# Patient Record
Sex: MALE | Race: WHITE | NOT HISPANIC OR LATINO | URBAN - METROPOLITAN AREA
[De-identification: names, ages, dates, MRNs, and addresses within clinical notes are randomized per-mention and may not be internally consistent; named-entity substitution may affect disease eponyms.]

---

## 2020-11-27 ENCOUNTER — EMERGENCY (EMERGENCY)
Facility: HOSPITAL | Age: 85
LOS: 0 days | Discharge: HOME | End: 2020-11-27
Attending: EMERGENCY MEDICINE
Payer: MEDICARE

## 2020-11-27 VITALS
HEIGHT: 68 IN | OXYGEN SATURATION: 96 % | HEART RATE: 85 BPM | WEIGHT: 210.1 LBS | RESPIRATION RATE: 17 BRPM | TEMPERATURE: 99 F

## 2020-11-27 VITALS
DIASTOLIC BLOOD PRESSURE: 56 MMHG | HEART RATE: 72 BPM | TEMPERATURE: 98 F | OXYGEN SATURATION: 96 % | SYSTOLIC BLOOD PRESSURE: 154 MMHG | RESPIRATION RATE: 18 BRPM

## 2020-11-27 DIAGNOSIS — S09.93XA UNSPECIFIED INJURY OF FACE, INITIAL ENCOUNTER: ICD-10-CM

## 2020-11-27 DIAGNOSIS — Z88.5 ALLERGY STATUS TO NARCOTIC AGENT: ICD-10-CM

## 2020-11-27 DIAGNOSIS — Z23 ENCOUNTER FOR IMMUNIZATION: ICD-10-CM

## 2020-11-27 DIAGNOSIS — S00.201A UNSPECIFIED SUPERFICIAL INJURY OF RIGHT EYELID AND PERIOCULAR AREA, INITIAL ENCOUNTER: ICD-10-CM

## 2020-11-27 DIAGNOSIS — Z98.89 OTHER SPECIFIED POSTPROCEDURAL STATES: Chronic | ICD-10-CM

## 2020-11-27 DIAGNOSIS — R60.0 LOCALIZED EDEMA: ICD-10-CM

## 2020-11-27 DIAGNOSIS — S09.8XXA OTHER SPECIFIED INJURIES OF HEAD, INITIAL ENCOUNTER: ICD-10-CM

## 2020-11-27 DIAGNOSIS — E11.9 TYPE 2 DIABETES MELLITUS WITHOUT COMPLICATIONS: ICD-10-CM

## 2020-11-27 DIAGNOSIS — Z79.02 LONG TERM (CURRENT) USE OF ANTITHROMBOTICS/ANTIPLATELETS: ICD-10-CM

## 2020-11-27 DIAGNOSIS — Z88.0 ALLERGY STATUS TO PENICILLIN: ICD-10-CM

## 2020-11-27 DIAGNOSIS — C44.91 BASAL CELL CARCINOMA OF SKIN, UNSPECIFIED: Chronic | ICD-10-CM

## 2020-11-27 DIAGNOSIS — M72.0 PALMAR FASCIAL FIBROMATOSIS [DUPUYTREN]: Chronic | ICD-10-CM
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
## 2020-11-27 DIAGNOSIS — M19.90 UNSPECIFIED OSTEOARTHRITIS, UNSPECIFIED SITE: ICD-10-CM

## 2020-11-27 DIAGNOSIS — E78.5 HYPERLIPIDEMIA, UNSPECIFIED: ICD-10-CM

## 2020-11-27 DIAGNOSIS — I10 ESSENTIAL (PRIMARY) HYPERTENSION: ICD-10-CM
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
## 2020-11-27 DIAGNOSIS — H33.20 SEROUS RETINAL DETACHMENT, UNSPECIFIED EYE: Chronic | ICD-10-CM

## 2020-11-27 PROCEDURE — 99284 EMERGENCY DEPT VISIT MOD MDM: CPT | Mod: 25

## 2020-11-27 PROCEDURE — 12011 RPR F/E/E/N/L/M 2.5 CM/<: CPT

## 2020-11-27 PROCEDURE — 72125 CT NECK SPINE W/O DYE: CPT | Mod: 26

## 2020-11-27 PROCEDURE — 70450 CT HEAD/BRAIN W/O DYE: CPT

## 2020-11-27 PROCEDURE — 82962 GLUCOSE BLOOD TEST: CPT

## 2020-11-27 PROCEDURE — 76376 3D RENDER W/INTRP POSTPROCES: CPT

## 2020-11-27 PROCEDURE — 70486 CT MAXILLOFACIAL W/O DYE: CPT

## 2020-11-27 PROCEDURE — 99283 EMERGENCY DEPT VISIT LOW MDM: CPT

## 2020-11-27 PROCEDURE — 72125 CT NECK SPINE W/O DYE: CPT

## 2020-11-27 PROCEDURE — 90471 IMMUNIZATION ADMIN: CPT

## 2020-11-27 PROCEDURE — 70486 CT MAXILLOFACIAL W/O DYE: CPT | Mod: 26

## 2020-11-27 PROCEDURE — 70450 CT HEAD/BRAIN W/O DYE: CPT | Mod: 26

## 2020-11-27 PROCEDURE — 76376 3D RENDER W/INTRP POSTPROCES: CPT | Mod: 26

## 2020-11-27 PROCEDURE — 90715 TDAP VACCINE 7 YRS/> IM: CPT

## 2020-11-27 RX ORDER — ACETAMINOPHEN 500 MG
650 TABLET ORAL ONCE
Refills: 0 | Status: COMPLETED | OUTPATIENT
Start: 2020-11-27 | End: 2020-11-27

## 2020-11-27 RX ORDER — TETANUS TOXOID, REDUCED DIPHTHERIA TOXOID AND ACELLULAR PERTUSSIS VACCINE, ADSORBED 5; 2.5; 8; 8; 2.5 [IU]/.5ML; [IU]/.5ML; UG/.5ML; UG/.5ML; UG/.5ML
0.5 SUSPENSION INTRAMUSCULAR ONCE
Refills: 0 | Status: COMPLETED | OUTPATIENT
Start: 2020-11-27 | End: 2020-11-27

## 2020-11-27 RX ADMIN — TETANUS TOXOID, REDUCED DIPHTHERIA TOXOID AND ACELLULAR PERTUSSIS VACCINE, ADSORBED 0.5 MILLILITER(S): 5; 2.5; 8; 8; 2.5 SUSPENSION INTRAMUSCULAR at 07:55

## 2020-11-27 RX ADMIN — Medication 650 MILLIGRAM(S): at 06:10

## 2020-11-27 NOTE — ED PROVIDER NOTE - PATIENT PORTAL LINK FT
You can access the FollowMyHealth Patient Portal offered by [facility] by registering at the following website: [email or web address]. By joining [facility]’s FollowMyHealth portal, you will also be able to view your health information using other applications (apps) compatible with our system. Island Pedicle Flap With Canthal Suspension Text: The defect edges were debeveled with a #15 scalpel blade.  Given the location of the defect, shape of the defect and the proximity to free margins an island pedicle advancement flap was deemed most appropriate.  Using a sterile surgical marker, an appropriate advancement flap was drawn incorporating the defect, outlining the appropriate donor tissue and placing the expected incisions within the relaxed skin tension lines where possible. The area thus outlined was incised deep to adipose tissue with a #15 scalpel blade.  The skin margins were undermined to an appropriate distance in all directions around the primary defect and laterally outward around the island pedicle utilizing iris scissors.  There was minimal undermining beneath the pedicle flap. A suspension suture was placed in the canthal tendon to prevent tension and prevent ectropion.

## 2020-11-27 NOTE — ED PROVIDER NOTE - OBJECTIVE STATEMENT
89 yo male with h/o HTN, HLD, DM s/p fall. Pt rolled over in bed and fell out.  Hit his head on the nightstand on the way down.  C/o pain and swelling to the right eyelid.  Denies headache, neck pain, vision changes, back pain, chest pain or extremity injury.

## 2020-11-27 NOTE — ED ADULT NURSE NOTE - OBJECTIVE STATEMENT
Pt rolled over in bed and fell out.  Hit his head on the nightstand on the way down.  C/o pain and swelling to the right eyelid.  Denies headache, neck pain, vision changes, back pain, chest pain or extremity injury.

## 2020-11-27 NOTE — ED PROVIDER NOTE - ENMT, MLM
Airway patent, Nasal mucosa clear. Mouth with normal mucosa. +right eyebrow hematoma and superficial laceration

## 2020-11-27 NOTE — ED PROVIDER NOTE - EYES, MLM
Clear bilaterally, pupils equal, round and reactive to light.  EOMI.  Right upper eyelid ecchymotic and edematous.  Cannot open eyelid on his own; vision clear when eyelid opened

## 2020-11-27 NOTE — ED PROVIDER NOTE - CARE PLAN
Principal Discharge DX:	Head injury  Secondary Diagnosis:	Eyebrow laceration, right, initial encounter

## 2020-11-27 NOTE — ED ADULT TRIAGE NOTE - CHIEF COMPLAINT QUOTE
patient states he rolled out of bed and hit his head on the night stand.  patient has hematoma to right eye.  denies LOC no other pain or injury reported [facility] on [facility] alert called.

## 2020-11-27 NOTE — ED ADULT NURSE NOTE - CHIEF COMPLAINT QUOTE
patient states he rolled out of bed and hit his head on the night stand.  patient has hematoma to right eye.  denies LOC no other pain or injury reported [facility] on [facility] alert called.

## 2020-11-27 NOTE — ED PROVIDER NOTE - PMH
Carotid artery disorder  Left carotid congenital anomaly  HLD (hyperlipidemia)    HTN (hypertension)    Melanoma in situ  left thigh  Osteoarthritis    Type 2 diabetes mellitus without complication

## 2020-11-27 NOTE — ED PROVIDER NOTE - NSFOLLOWUPINSTRUCTIONS_ED_ALL_ED_FT
See attached results of CT scans  Sleep with head of the bed elevated and ice to reduce swelling  Skin adhesive will absorb in the next week. Do not pick at the wound.  Keep dry x 24 hours  Return to ED for any further concerns  Tylenol 1 gram every 6 hours if needed    Head Injury, Adult  There are many types of head injuries. Head injuries can be as minor as a bump, or they can be more severe. More severe head injuries include:  A jarring injury to the brain (concussion).A bruise of the brain (contusion). This means there is bleeding in the brain that can cause swelling.A cracked skull (skull fracture).Bleeding in the brain that collects, clots, and forms a bump (hematoma).After a head injury, you may need to be observed for a while in the emergency department or urgent care. Sometimes admission to the hospital is needed.  After a head injury has happened, most problems occur within the first 24 hours, but side effects may occur up to 7–10 days after the injury. It is important to watch your condition for any changes.  What are the causes?  There are many possible causes of a head injury. A serious head injury may happen to someone who is in a car accident (motor vehicle collision). Other causes of major head injuries include bicycle or motorcycle accidents, sports injuries, and falls.  Risk factors  This condition is more likely to occur in people who:  Drink a lot of alcohol or use drugs.Are over the age of 65.Are at risk for falls.What are the symptoms?  There are many possible symptoms of a head injury. Visible symptoms of a head injury include a bruise, bump, or bleeding at the site of the injury. Other non-visible symptoms include:  Feeling sleepy or not being able to stay awake.Passing out.Headache.Seizures.Dizziness.Confusion.Memory problems.Nausea or vomiting.Other possible symptoms that may develop after the head injury include:  Poor attention and concentration.Fatigue or tiring easily.Irritability.Being uncomfortable around bright lights or loud noises.Anxiety or depression.Disturbed sleep.How is this diagnosed?  This condition can usually be diagnosed based on your symptoms, a description of the injury, and a physical exam. You may also have imaging tests done, such as a CT scan or MRI. You will also be closely watched.  How is this treated?  Treatment for this condition depends on the severity and type of injury you have. The main goal of treatment is to prevent complications and allow the brain time to heal.  For mild head injury, you may be sent home and treatment may include:  Observation. A responsible adult should stay with you for 24 hours after your injury and check on you often.Physical rest.Brain rest.Pain medicines.For severe brain injury, treatment may include:  Close observation. This includes hospitalization with frequent physical exams. You may need to go to a hospital that specializes in head injury.Pain medicines.Breathing support. This may include using a ventilator.Managing the pressure inside the brain (intracranial pressure, or ICP). This may include:  Monitoring the ICP.Giving medicines to decrease the ICP.Positioning you to decrease the ICP.Medicine to prevent seizures.Surgery to stop bleeding or to remove blood clots (craniotomy).Surgery to remove part of the skull (decompressive craniectomy). This allows room for the brain to swell.Follow these instructions at home:  Activity     Rest as much as possible and avoid activities that are physically hard or tiring.Make sure you get enough sleep.Limit activities that require a lot of thought or attention, such as:  Watching TV.Playing memory games and puzzles.Job-related work or homework.Working on the computer, social media, and texting.Avoid activities that could cause another head injury, such as playing sports, until your health care provider approves. Having another head injury, especially before the first one has healed, can be dangerous.Ask your health care provider when it is safe for you to return to your regular activities, including work or school. Ask your health care provider for a step-by-step plan for gradually returning to activities.Ask your health care provider when you can drive, ride a bicycle, or use heavy machinery. Your ability to react may be slower after a brain injury. Never do these activities if you are dizzy.Lifestyle     Do not drink alcohol until your health care provider approves, and avoid drug use. Alcohol and certain drugs may slow your recovery and can put you at risk of further injury.If it is harder than usual to remember things, write them down.If you are easily distracted, try to do one thing at a time.Talk with family members or close friends when making important decisions.Tell your friends, family, a trusted colleague, and  about your injury, symptoms, and restrictions. Have them watch for any new or worsening problems.General instructions     Take over-the-counter and prescription medicines only as told by your health care provider.Have someone stay with you for 24 hours after your head injury. This person should watch you for any changes in your symptoms and be ready to seek medical help, as needed.Keep all follow-up visits as told by your health care provider. This is important.How is this prevented?  Work on improving your balance and strength to avoid falls.Wear a seatbelt when you are in a moving vehicle.Wear a helmet when riding a bicycle, skiing, or doing any other sport or activity that has a risk of injury.Drink alcohol only in moderation.Take safety measures in your home, such as:  Removing clutter and tripping hazards from floors and stairways.Using grab bars in bathrooms and handrails by stairs.Placing non-slip mats on floors and in bathtubs.Improving lighting in dim areas.Get help right away if:  You have:  A severe headache that is not helped by medicine.Trouble walking, have weakness in your arms and legs, or lose your balance.Clear or bloody fluid coming from your nose or ears.Changes in your vision.A seizure.You vomit.Your symptoms get worse.Your speech is slurred.You pass out.You are sleepier and have trouble staying awake.Your pupils change size.These symptoms may represent a serious problem that is an emergency. Do not wait to see if the symptoms will go away. Get medical help right away. Call your local emergency services (911 in the [place] Do not drive yourself to the hospital.   This information is not intended to replace advice given to you by your health care provider. Make sure you discuss any questions you have with your health care provider.    Laceration    A laceration is a cut that goes through all of the layers of the skin and into the tissue that is right under the skin. Some lacerations heal on their own. Others need to be closed with skin adhesive strips, skin glue, stitches (sutures), or staples. Proper laceration care minimizes the risk of infection and helps the laceration to heal better.  If non-absorbable stitches or staples have been placed, they must be taken out within the time frame instructed by your healthcare provider.    SEEK IMMEDIATE MEDICAL CARE IF YOU HAVE ANY OF THE FOLLOWING SYMPTOMS: swelling around the wound, worsening pain, drainage from the wound, red streaking going away from your wound, inability to move finger or toe near the laceration, or discoloration of skin near the laceration.

## 2021-12-19 ENCOUNTER — INPATIENT (INPATIENT)
Facility: HOSPITAL | Age: 86
LOS: 3 days | Discharge: HOME | DRG: 299 | End: 2021-12-23
Attending: INTERNAL MEDICINE | Admitting: INTERNAL MEDICINE
Payer: MEDICARE

## 2021-12-19 VITALS
RESPIRATION RATE: 16 BRPM | WEIGHT: 220.02 LBS | TEMPERATURE: 98 F | DIASTOLIC BLOOD PRESSURE: 82 MMHG | SYSTOLIC BLOOD PRESSURE: 132 MMHG | HEART RATE: 112 BPM | HEIGHT: 68 IN | OXYGEN SATURATION: 95 %

## 2021-12-19 DIAGNOSIS — R09.89 OTHER SPECIFIED SYMPTOMS AND SIGNS INVOLVING THE CIRCULATORY AND RESPIRATORY SYSTEMS: ICD-10-CM

## 2021-12-19 DIAGNOSIS — C44.91 BASAL CELL CARCINOMA OF SKIN, UNSPECIFIED: Chronic | ICD-10-CM

## 2021-12-19 DIAGNOSIS — Z98.89 OTHER SPECIFIED POSTPROCEDURAL STATES: Chronic | ICD-10-CM

## 2021-12-19 DIAGNOSIS — I48.91 UNSPECIFIED ATRIAL FIBRILLATION: ICD-10-CM

## 2021-12-19 DIAGNOSIS — H33.20 SEROUS RETINAL DETACHMENT, UNSPECIFIED EYE: Chronic | ICD-10-CM

## 2021-12-19 DIAGNOSIS — M72.0 PALMAR FASCIAL FIBROMATOSIS [DUPUYTREN]: Chronic | ICD-10-CM

## 2021-12-19 LAB
ALBUMIN SERPL ELPH-MCNC: 3.1 G/DL — LOW (ref 3.3–5)
ALP SERPL-CCNC: 128 U/L — HIGH (ref 30–120)
ALT FLD-CCNC: 76 U/L DA — HIGH (ref 10–60)
ANION GAP SERPL CALC-SCNC: 13 MMOL/L — SIGNIFICANT CHANGE UP (ref 5–17)
APPEARANCE UR: CLEAR — SIGNIFICANT CHANGE UP
APTT BLD: 31.6 SEC — SIGNIFICANT CHANGE UP (ref 27.5–35.5)
AST SERPL-CCNC: 36 U/L — SIGNIFICANT CHANGE UP (ref 10–40)
BASOPHILS # BLD AUTO: 0.05 K/UL — SIGNIFICANT CHANGE UP (ref 0–0.2)
BASOPHILS NFR BLD AUTO: 0.4 % — SIGNIFICANT CHANGE UP (ref 0–2)
BILIRUB SERPL-MCNC: 0.7 MG/DL — SIGNIFICANT CHANGE UP (ref 0.2–1.2)
BILIRUB UR-MCNC: NEGATIVE — SIGNIFICANT CHANGE UP
BUN SERPL-MCNC: 61 MG/DL — HIGH (ref 7–23)
CALCIUM SERPL-MCNC: 8 MG/DL — LOW (ref 8.4–10.5)
CHLORIDE SERPL-SCNC: 107 MMOL/L — SIGNIFICANT CHANGE UP (ref 96–108)
CO2 SERPL-SCNC: 21 MMOL/L — LOW (ref 22–31)
COLOR SPEC: YELLOW — SIGNIFICANT CHANGE UP
CREAT SERPL-MCNC: 1.73 MG/DL — HIGH (ref 0.5–1.3)
DIFF PNL FLD: NEGATIVE — SIGNIFICANT CHANGE UP
EOSINOPHIL # BLD AUTO: 0.06 K/UL — SIGNIFICANT CHANGE UP (ref 0–0.5)
EOSINOPHIL NFR BLD AUTO: 0.5 % — SIGNIFICANT CHANGE UP (ref 0–6)
GLUCOSE SERPL-MCNC: 146 MG/DL — HIGH (ref 70–99)
GLUCOSE UR QL: NEGATIVE MG/DL — SIGNIFICANT CHANGE UP
HCT VFR BLD CALC: 37.3 % — LOW (ref 39–50)
HGB BLD-MCNC: 12.1 G/DL — LOW (ref 13–17)
IMM GRANULOCYTES NFR BLD AUTO: 1 % — SIGNIFICANT CHANGE UP (ref 0–1.5)
INR BLD: 1.19 RATIO — HIGH (ref 0.88–1.16)
KETONES UR-MCNC: NEGATIVE — SIGNIFICANT CHANGE UP
LACTATE SERPL-SCNC: 1.6 MMOL/L — SIGNIFICANT CHANGE UP (ref 0.7–2)
LEUKOCYTE ESTERASE UR-ACNC: ABNORMAL
LIDOCAIN IGE QN: 93 U/L — SIGNIFICANT CHANGE UP (ref 73–393)
LYMPHOCYTES # BLD AUTO: 1.84 K/UL — SIGNIFICANT CHANGE UP (ref 1–3.3)
LYMPHOCYTES # BLD AUTO: 16 % — SIGNIFICANT CHANGE UP (ref 13–44)
MCHC RBC-ENTMCNC: 30 PG — SIGNIFICANT CHANGE UP (ref 27–34)
MCHC RBC-ENTMCNC: 32.4 GM/DL — SIGNIFICANT CHANGE UP (ref 32–36)
MCV RBC AUTO: 92.3 FL — SIGNIFICANT CHANGE UP (ref 80–100)
MONOCYTES # BLD AUTO: 0.78 K/UL — SIGNIFICANT CHANGE UP (ref 0–0.9)
MONOCYTES NFR BLD AUTO: 6.8 % — SIGNIFICANT CHANGE UP (ref 2–14)
NEUTROPHILS # BLD AUTO: 8.66 K/UL — HIGH (ref 1.8–7.4)
NEUTROPHILS NFR BLD AUTO: 75.3 % — SIGNIFICANT CHANGE UP (ref 43–77)
NITRITE UR-MCNC: NEGATIVE — SIGNIFICANT CHANGE UP
NRBC # BLD: 0 /100 WBCS — SIGNIFICANT CHANGE UP (ref 0–0)
NT-PROBNP SERPL-SCNC: 7572 PG/ML — HIGH (ref 0–450)
PH UR: 5 — SIGNIFICANT CHANGE UP (ref 5–8)
PLATELET # BLD AUTO: 263 K/UL — SIGNIFICANT CHANGE UP (ref 150–400)
POTASSIUM SERPL-MCNC: 4.7 MMOL/L — SIGNIFICANT CHANGE UP (ref 3.5–5.3)
POTASSIUM SERPL-SCNC: 4.7 MMOL/L — SIGNIFICANT CHANGE UP (ref 3.5–5.3)
PROT SERPL-MCNC: 6.3 G/DL — SIGNIFICANT CHANGE UP (ref 6–8.3)
PROT UR-MCNC: 30 MG/DL
PROTHROM AB SERPL-ACNC: 14.3 SEC — HIGH (ref 10.6–13.6)
RAPID RVP RESULT: SIGNIFICANT CHANGE UP
RBC # BLD: 4.04 M/UL — LOW (ref 4.2–5.8)
RBC # FLD: 14.1 % — SIGNIFICANT CHANGE UP (ref 10.3–14.5)
SARS-COV-2 RNA SPEC QL NAA+PROBE: SIGNIFICANT CHANGE UP
SODIUM SERPL-SCNC: 141 MMOL/L — SIGNIFICANT CHANGE UP (ref 135–145)
SP GR SPEC: 1.02 — SIGNIFICANT CHANGE UP (ref 1.01–1.02)
TROPONIN I, HIGH SENSITIVITY RESULT: 26.4 NG/L — SIGNIFICANT CHANGE UP
UROBILINOGEN FLD QL: NEGATIVE MG/DL — SIGNIFICANT CHANGE UP
WBC # BLD: 11.5 K/UL — HIGH (ref 3.8–10.5)
WBC # FLD AUTO: 11.5 K/UL — HIGH (ref 3.8–10.5)

## 2021-12-19 PROCEDURE — 99223 1ST HOSP IP/OBS HIGH 75: CPT

## 2021-12-19 PROCEDURE — 93010 ELECTROCARDIOGRAM REPORT: CPT

## 2021-12-19 PROCEDURE — 71045 X-RAY EXAM CHEST 1 VIEW: CPT | Mod: 26

## 2021-12-19 PROCEDURE — 93970 EXTREMITY STUDY: CPT | Mod: 26

## 2021-12-19 PROCEDURE — 99291 CRITICAL CARE FIRST HOUR: CPT

## 2021-12-19 RX ORDER — SODIUM CHLORIDE 9 MG/ML
2100 INJECTION INTRAMUSCULAR; INTRAVENOUS; SUBCUTANEOUS ONCE
Refills: 0 | Status: COMPLETED | OUTPATIENT
Start: 2021-12-19 | End: 2021-12-19

## 2021-12-19 RX ORDER — GABAPENTIN 400 MG/1
100 CAPSULE ORAL DAILY
Refills: 0 | Status: DISCONTINUED | OUTPATIENT
Start: 2021-12-19 | End: 2021-12-23

## 2021-12-19 RX ORDER — DILTIAZEM HCL 120 MG
10 CAPSULE, EXT RELEASE 24 HR ORAL
Qty: 125 | Refills: 0 | Status: DISCONTINUED | OUTPATIENT
Start: 2021-12-19 | End: 2021-12-20

## 2021-12-19 RX ORDER — DILTIAZEM HCL 120 MG
10 CAPSULE, EXT RELEASE 24 HR ORAL ONCE
Refills: 0 | Status: COMPLETED | OUTPATIENT
Start: 2021-12-19 | End: 2021-12-19

## 2021-12-19 RX ORDER — FUROSEMIDE 40 MG
40 TABLET ORAL ONCE
Refills: 0 | Status: COMPLETED | OUTPATIENT
Start: 2021-12-19 | End: 2021-12-19

## 2021-12-19 RX ORDER — DEXTROSE 50 % IN WATER 50 %
12.5 SYRINGE (ML) INTRAVENOUS ONCE
Refills: 0 | Status: DISCONTINUED | OUTPATIENT
Start: 2021-12-19 | End: 2021-12-23

## 2021-12-19 RX ORDER — CLOPIDOGREL BISULFATE 75 MG/1
37.5 TABLET, FILM COATED ORAL DAILY
Refills: 0 | Status: DISCONTINUED | OUTPATIENT
Start: 2021-12-19 | End: 2021-12-20

## 2021-12-19 RX ORDER — IPRATROPIUM/ALBUTEROL SULFATE 18-103MCG
3 AEROSOL WITH ADAPTER (GRAM) INHALATION ONCE
Refills: 0 | Status: COMPLETED | OUTPATIENT
Start: 2021-12-19 | End: 2021-12-19

## 2021-12-19 RX ORDER — DEXTROSE 50 % IN WATER 50 %
25 SYRINGE (ML) INTRAVENOUS ONCE
Refills: 0 | Status: DISCONTINUED | OUTPATIENT
Start: 2021-12-19 | End: 2021-12-23

## 2021-12-19 RX ORDER — INSULIN GLARGINE 100 [IU]/ML
10 INJECTION, SOLUTION SUBCUTANEOUS EVERY MORNING
Refills: 0 | Status: DISCONTINUED | OUTPATIENT
Start: 2021-12-20 | End: 2021-12-23

## 2021-12-19 RX ORDER — DEXTROSE 50 % IN WATER 50 %
15 SYRINGE (ML) INTRAVENOUS ONCE
Refills: 0 | Status: DISCONTINUED | OUTPATIENT
Start: 2021-12-19 | End: 2021-12-23

## 2021-12-19 RX ORDER — SODIUM CHLORIDE 9 MG/ML
1000 INJECTION, SOLUTION INTRAVENOUS
Refills: 0 | Status: DISCONTINUED | OUTPATIENT
Start: 2021-12-19 | End: 2021-12-23

## 2021-12-19 RX ORDER — METOPROLOL TARTRATE 50 MG
50 TABLET ORAL
Refills: 0 | Status: DISCONTINUED | OUTPATIENT
Start: 2021-12-19 | End: 2021-12-22

## 2021-12-19 RX ORDER — ENOXAPARIN SODIUM 100 MG/ML
100 INJECTION SUBCUTANEOUS ONCE
Refills: 0 | Status: COMPLETED | OUTPATIENT
Start: 2021-12-19 | End: 2021-12-19

## 2021-12-19 RX ORDER — ENOXAPARIN SODIUM 100 MG/ML
100 INJECTION SUBCUTANEOUS
Refills: 0 | Status: DISCONTINUED | OUTPATIENT
Start: 2021-12-20 | End: 2021-12-21

## 2021-12-19 RX ORDER — INSULIN LISPRO 100/ML
VIAL (ML) SUBCUTANEOUS AT BEDTIME
Refills: 0 | Status: DISCONTINUED | OUTPATIENT
Start: 2021-12-19 | End: 2021-12-23

## 2021-12-19 RX ORDER — INSULIN LISPRO 100/ML
VIAL (ML) SUBCUTANEOUS
Refills: 0 | Status: DISCONTINUED | OUTPATIENT
Start: 2021-12-19 | End: 2021-12-23

## 2021-12-19 RX ORDER — ATORVASTATIN CALCIUM 80 MG/1
20 TABLET, FILM COATED ORAL AT BEDTIME
Refills: 0 | Status: DISCONTINUED | OUTPATIENT
Start: 2021-12-19 | End: 2021-12-23

## 2021-12-19 RX ORDER — GLUCAGON INJECTION, SOLUTION 0.5 MG/.1ML
1 INJECTION, SOLUTION SUBCUTANEOUS ONCE
Refills: 0 | Status: DISCONTINUED | OUTPATIENT
Start: 2021-12-19 | End: 2021-12-23

## 2021-12-19 RX ADMIN — Medication 3 MILLILITER(S): at 23:16

## 2021-12-19 RX ADMIN — Medication 10 MG/HR: at 17:03

## 2021-12-19 RX ADMIN — Medication 10 MG/HR: at 15:11

## 2021-12-19 RX ADMIN — Medication 40 MILLIGRAM(S): at 23:00

## 2021-12-19 RX ADMIN — Medication 10 MG/HR: at 20:45

## 2021-12-19 RX ADMIN — ENOXAPARIN SODIUM 100 MILLIGRAM(S): 100 INJECTION SUBCUTANEOUS at 16:10

## 2021-12-19 RX ADMIN — Medication 50 MILLIGRAM(S): at 23:44

## 2021-12-19 RX ADMIN — SODIUM CHLORIDE 2100 MILLILITER(S): 9 INJECTION INTRAMUSCULAR; INTRAVENOUS; SUBCUTANEOUS at 15:17

## 2021-12-19 RX ADMIN — Medication 10 MILLIGRAM(S): at 15:10

## 2021-12-19 NOTE — ED PROVIDER NOTE - CRITICAL CARE ATTENDING CONTRIBUTION TO CARE
pt presenting with A-fib RVR, Cardizem gtt ordered and titrated to rate control. Pt with HARESH so CTA deferred but venous duplex obtained and DVT noted. Pt has been AC with Lovenox. Cardiology consulted. Pt will require admission. Daughter and pt updated

## 2021-12-19 NOTE — ED PROVIDER NOTE - OBJECTIVE STATEMENT
89 y/o male with a PMHx of DM2, HLD, HTN, h/o of basal cell carcinoma, left carotid congential anomaly, OA, melanoma presents to the ED c/o fatigue & dry cough for the past few days and one episode of vomiting today. Pt states he was been feeling "weary" on the back of his shoulder blades for the past few days so he went to see Dr. Richard on 12/17/2021 and had a COVID test which reportedly came back negative. Today, pt woke up with no appetite and attempted to eat some bread and butter which he immediately vomited so came to the ED for evaluation. Pt also notes he has not been sleeping well. Denies fever, CP, SOB, abd pain, or any other symptoms. No other complaints at this time. PCP: Dr. Frank Richard.

## 2021-12-19 NOTE — H&P ADULT - PROBLEM SELECTOR PLAN 2
possibly unprovoked, no h/o previous thromboembolism, possible associated PE but no CTA given his advanced nephropathy, and will not change the management, TTE for RV evaluation in am, patient was started on LMWH by ED team, transition to renal dose Eliquis in am, cardiology consult was already called.

## 2021-12-19 NOTE — H&P ADULT - NSHPLABSRESULTS_GEN_ALL_CORE
-      Lactate Trend   @ 16:15 Lactate:1.6                           12.1   11.50 )-----------( 263      ( 19 Dec 2021 16:14 )             37.3                141  |  107  |  61<H>  ----------------------------<  146<H>  4.7   |  21<L>  |  1.73<H>    Ca    8.0<L>      19 Dec 2021 16:14    TPro  6.3  /  Alb  3.1<L>  /  TBili  0.7  /  DBili  x   /  AST  36  /  ALT  76<H>  /  AlkPhos  128<H>            Urinalysis Basic - ( 19 Dec 2021 19:54 )    Color: Yellow / Appearance: Clear / S.020 / pH: x  Gluc: x / Ketone: Negative  / Bili: Negative / Urobili: Negative mg/dL   Blood: x / Protein: 30 mg/dL / Nitrite: Negative   Leuk Esterase: Small / RBC: x / WBC 0-2   Sq Epi: x / Non Sq Epi: Occasional / Bacteria: Few      PT/INR - ( 19 Dec 2021 16:14 )   PT: 14.3 sec;   INR: 1.19 ratio    PTT - ( 19 Dec 2021 16:14 )  PTT:31.6 sec      CAPILLARY BLOOD GLUCOSE  POCT Blood Glucose.: 128 mg/dL (19 Dec 2021 23:02)      SARS-CoV-2: NotDetec (19 Dec 2021 20:46)    Lipase, Serum (. @ 16:14)   Lipase, Serum: 93 U/L     Serum Pro-Brain Natriuretic Peptide (. @ 16:14)   Serum Pro-Brain Natriuretic Peptide: 7572 pg/mL       US DPLX LWR EXT VEINS COMPL BI                        PROCEDURE DATE:  2021    INTERPRETATION:  CLINICAL INFORMATION: Leg pain and swelling.  COMPARISON: Venous Doppler dated 10/14/2015.  TECHNIQUE: Duplex sonography of the BILATERAL LOWER extremity veins with   color and spectral Doppler, with and without compression.  FINDINGS:  RIGHT:  Normal compressibility of the RIGHT common femoral, femoral and popliteal   veins.  Doppler examination shows normal spontaneous and phasic flow.  No RIGHT calf vein thrombosis is detected.  LEFT:  There is nonocclusive thrombus within the left deep femoral vein.  Normal compressibility of the LEFT common femoral, femoral and popliteal   veins.  Doppler examination shows normal spontaneous and phasic flow.  No LEFT calf vein thrombosis is detected.  IMPRESSION:  Nonocclusive deep vein thrombosis of the left deep femoral vein.  Acute deep venous thrombosis: above the knee.          CXR:    As per my review shows normal cardiac shadow size, mild pulmonary venous congestion, no pulmonary infiltrates, pleural effusion, or pneumothorax. Pending official report.        EKG:    As per my review shows A. Fib with RVR at 180/min, LAD (- 75), normal QRS voltage & duration, with normal transition, nonspecific ST-T abnormality.      -

## 2021-12-19 NOTE — ED ADULT NURSE NOTE - OBJECTIVE STATEMENT
pt is A&ox4, presented to the ER for loss of appetite, nausea and vomitting this morning. pt denies any chest pain, dizziness, headache, sob at this moment, Elevated Hr noted (see flowsheet) Afib noted on the monitor, MD at the bedside, meds given immediately, pt appears in nad, resp even and unlabored, skin warm to touch, redness noted on the left groin, resp even and unlabored, will continue to monitor

## 2021-12-19 NOTE — H&P ADULT - ASSESSMENT
89 y/o M with PMH of HTN, Type 2 DM, Dyslipidemia, PAD, CKD stage 3, Melanoma in situ s/p excision, Diverticulitis, Retinal Detachment s/p repair, OA, and Obesity presented with generalized weakness, found to have acute DVT and in A Fib with RVR.

## 2021-12-19 NOTE — ED PROVIDER NOTE - CARE PLAN
Principal Discharge DX:	Atrial fibrillation with slow ventricular response   1 Principal Discharge DX:	Atrial fibrillation with RVR  Secondary Diagnosis:	DVT of lower limb, acute

## 2021-12-19 NOTE — ED PROVIDER NOTE - NSICDXPASTSURGICALHX_GEN_ALL_CORE_FT
PAST SURGICAL HISTORY:  Basal cell cancer Moh's procedure 4/08/2014    Dupuytrens contracture release - both hands    Retinal detachment repair - right eye    S/P lumbar laminectomy

## 2021-12-19 NOTE — H&P ADULT - NSHPREVIEWOFSYSTEMS_GEN_ALL_CORE
-    CONSTITUTIONAL: No fever or chills.  EYES: No eye pain, new visual disturbances, or discharge.  ENMT:  (+) longstanding difficulty hearing on B/L hearing aids, no vertigo, sinus or throat pain.  NECK: No pain or stiffness.	  RESPIRATORY: No cough, wheezing, or hemoptysis; No shortness of breath.  CARDIOVASCULAR: No chest pain, palpitations awareness, dizziness, or leg swelling.  GASTROINTESTINAL: No abdominal pain, (+) nausea, (+)vomiting X1, no hematemesis; No diarrhea or Change in bowel habits. No melena or hematochezia.  GENITOURINARY: No dysuria, frequency, hematuria, or incontinence.  NEUROLOGICAL: No headaches, focal muscle weakness, numbness, or tremors.  SKIN: No itching, burning or rashes.  MUSCULOSKELETAL: No new joint pain, no swelling.  PSYCHIATRIC: No depression, anxiety, or agitation.  HEME/LYMPH: No easy bruising, bleeding gums, or nose bleed.  ALLERGY AND IMMUNOLOGIC: No hives or eczema.

## 2021-12-19 NOTE — GOALS OF CARE CONVERSATION - ADVANCED CARE PLANNING - WHAT MATTERS MOST
Would like to treat acute medical issues in hopes of recovery  Patient considers quality of life to be as long as he is mentally in tact

## 2021-12-19 NOTE — ED ADULT TRIAGE NOTE - CHIEF COMPLAINT QUOTE
"I have no appetite & vomited once today. I feel very fatigued. I have a dry cogh I tested neg for Covid 2 days ago"

## 2021-12-19 NOTE — H&P ADULT - PROBLEM SELECTOR PLAN 5
controlled, hold Tradjenta & Januvia, started him on Lantus [id number] units subcutaneously daily in addition to insulin Lispro on a sliding scale before meals & at bedtime, adjust the Lantus dose based on total daily insulin requirements, continue low dose Gabapentin for neuropathy. Patient not on any ACEIs or ARBs, will not add any given his carotid stenosis & advanced kidney dysfunction. Glycohemoglobin level in am.

## 2021-12-19 NOTE — H&P ADULT - PROBLEM SELECTOR PLAN 3
without relative neutrophilia, ML 2ry to the above, no evidence suggestive of an infection, trend off antibiotics.

## 2021-12-19 NOTE — H&P ADULT - HISTORY OF PRESENT ILLNESS
This is a 89 y/o M with PMH of HTN, Type 2 DM, Dyslipidemia, PAD, CKD stage 3, Melanoma in situ s/p excision, Diverticulitis, Retinal Detachment s/p repair, OA, and Obesity who presented with a progressive generalized weakness with back aches over the past week associated with anorexia & intermittent nausea. Saw his PCP with some w/u, and this am he tried to eat "anything", but vomited immediately after eating some bread & butter, so he became concerned & called his PCP who recommended going to the hospital. No Fever or chills, also denies any chest pain, palpitation awareness, SOB, or lower extremity pain. AT the ED he was found to have a left deep femoral DVT, also was in A. Fib with RVR. Patient denies awareness of any history of cardiac disease.

## 2021-12-19 NOTE — GOALS OF CARE CONVERSATION - ADVANCED CARE PLANNING - CONVERSATION DETAILS
Met with patient's daughter Amy Jolly at bedside to discuss her father's condition. She states she is the HCP and has the paperwork at home, however all medical decisions would be made jointly between her and her two brothers. She also believes her father has a DNR and wishes for no heroic measures to be taken.     I spoke with the patient in the presence of his daughter Amy to confirm advanced directives. He confirmed that Amy is his HCP and wishes for everything to go through her. He also confirmed his wishes for DNR and DNI stating that he is 89 years old and "let it be in God's hands". Patient has capacity to make medical decisions at this time and therefore a MOLST was completed signed by him and placed in chart.

## 2021-12-19 NOTE — H&P ADULT - NSICDXPASTMEDICALHX_GEN_ALL_CORE_FT
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

## 2021-12-19 NOTE — CONSULT NOTE ADULT - SUBJECTIVE AND OBJECTIVE BOX
Patient is a 89y old  Male who presents with a chief complaint of Generalized weakness. (19 Dec 2021 22:29)      BRIEF HOSPITAL COURSE: 89 yo male, PMHx HTN, HLD, T2DM, stage III CKD, carotid stenosis, basal cell carcinoma, spinal stenosis with worsening debility over the past year, who presented from home with worsening fatigue, non-productive cough, and sense of impending doom. Symptoms initially started approximately 4 days prior, worsening today. He also notes shortness of breath, and upper back pain localized near his shoulder blades. He was seen by his PMD, ruled out for viral respiratory illnesses. Had poor appetite and 1 episode of vomiting this morning and was advised by his PMD to go to ED. Also admits b/l LE edema over the past few days. Denies chest pain, palpitations, calf pain. Upon arrival to ED, was found to have new onset AFib RVR. LE duplex revealed LLE DVT. Labs notable for HARESH on CKD. RVP and COVID-19 negative. He was given Lovenox for treatment of DVT, as well as Cardizem 10mg IVP x 1 and started on Cardizem gtt for AFib RVR.      Events last 24 hours: On maximum dose Cardizem gtt, HR remains uncontrolled >130.         PAST MEDICAL & SURGICAL HISTORY:  HLD (hyperlipidemia)    HTN (hypertension)    Carotid artery disorder  Left carotid congenital anomaly    Osteoarthritis    Melanoma in situ  left thigh    Type 2 diabetes mellitus without complication    History of basal cell carcinoma (BCC)    Basal cell cancer  Moh&#x27;s procedure 2014    S/P lumbar laminectomy    Retinal detachment  repair - right eye    Dupuytrens contracture  release - both hands            SOCIAL HISTORY: Never smoker, no EtOH use. Lives at home alone (wife  of Alzheimer's in ). Has HHA 5 days/week.        Review of Systems: see HPI. 12 point ROS otherwise negative.         Medications:    diltiazem Infusion 10 mG/Hr IV Continuous <Continuous>  metoprolol tartrate 50 milliGRAM(s) Oral two times a day      gabapentin 100 milliGRAM(s) Oral daily      clopidogrel Tablet 37.5 milliGRAM(s) Oral daily        atorvastatin 20 milliGRAM(s) Oral at bedtime  dextrose 40% Gel 15 Gram(s) Oral once  dextrose 50% Injectable 25 Gram(s) IV Push once  dextrose 50% Injectable 12.5 Gram(s) IV Push once  dextrose 50% Injectable 25 Gram(s) IV Push once  glucagon  Injectable 1 milliGRAM(s) IntraMuscular once  insulin lispro (ADMELOG) corrective regimen sliding scale   SubCutaneous three times a day before meals  insulin lispro (ADMELOG) corrective regimen sliding scale   SubCutaneous at bedtime    dextrose 5%. 1000 milliLiter(s) IV Continuous <Continuous>  dextrose 5%. 1000 milliLiter(s) IV Continuous <Continuous>                ICU Vital Signs Last 24 Hrs  T(C): 36.2 (19 Dec 2021 21:14), Max: 36.4 (19 Dec 2021 14:00)  T(F): 97.1 (19 Dec 2021 21:14), Max: 97.5 (19 Dec 2021 14:00)  HR: 130 (19 Dec 2021 23:00) (112 - 180)  BP: 130/71 (19 Dec 2021 23:00) (1/- - 156/87)  BP(mean): --  ABP: --  ABP(mean): --  RR: 21 (19 Dec 2021 23:00) (16 - 28)  SpO2: 94% (19 Dec 2021 23:00) (92% - 98%)          I&O's Detail    19 Dec 2021 07:01  -  19 Dec 2021 23:27  --------------------------------------------------------  IN:    Diltiazem: 120 mL  Total IN: 120 mL    OUT:    Voided (mL): 150 mL  Total OUT: 150 mL    Total NET: -30 mL            LABS:                        12.1   11.50 )-----------( 263      ( 19 Dec 2021 16:14 )             37.3     12-19    141  |  107  |  61<H>  ----------------------------<  146<H>  4.7   |  21<L>  |  1.73<H>    Ca    8.0<L>      19 Dec 2021 16:14    TPro  6.3  /  Alb  3.1<L>  /  TBili  0.7  /  DBili  x   /  AST  36  /  ALT  76<H>  /  AlkPhos  128<H>            CAPILLARY BLOOD GLUCOSE      POCT Blood Glucose.: 128 mg/dL (19 Dec 2021 23:02)    PT/INR - ( 19 Dec 2021 16:14 )   PT: 14.3 sec;   INR: 1.19 ratio         PTT - ( 19 Dec 2021 16:14 )  PTT:31.6 sec  Urinalysis Basic - ( 19 Dec 2021 19:54 )    Color: Yellow / Appearance: Clear / S.020 / pH: x  Gluc: x / Ketone: Negative  / Bili: Negative / Urobili: Negative mg/dL   Blood: x / Protein: 30 mg/dL / Nitrite: Negative   Leuk Esterase: Small / RBC: x / WBC 0-2   Sq Epi: x / Non Sq Epi: Occasional / Bacteria: Few      CULTURES:  Rapid RVP Result: NotDetec ( @ 20:46)            Physical Examination:    General: No acute distress.      HEENT: Pupils equal, reactive to light.  Symmetric.    PULM: Tachypneic. Rales diffusely bilaterally.    CVS: AFib RVR, no murmur    ABD: Soft, nondistended, nontender, normoactive bowel sounds    EXT: 1+ pitting b/l LE edema    SKIN: Warm and well perfused, no rashes noted.    NEURO: Alert, oriented, interactive, nonfocal        RADIOLOGY: < from: US Duplex Venous Lower Ext Complete, Bilateral (21 @ 17:31) >  ACC: [id number] EXAM:  US DPLX LWR EXT VEINS COMPL BI                          PROCEDURE DATE:  2021          INTERPRETATION:  CLINICAL INFORMATION: Leg pain and swelling.    COMPARISON: Venous Doppler dated 10/14/2015.    TECHNIQUE: Duplex sonography of the BILATERAL LOWER extremity veins with   color and spectral Doppler, with and without compression.    FINDINGS:    RIGHT:  Normal compressibility of the RIGHT common femoral, femoral and popliteal   veins.  Doppler examination shows normal spontaneous and phasic flow.  No RIGHT calf vein thrombosis is detected.    LEFT:    There is nonocclusive thrombus within the left deep femoral vein.  Normal compressibility of the LEFT common femoral, femoral and popliteal   veins.  Doppler examination shows normal spontaneous and phasic flow.  No LEFT calf vein thrombosis is detected.    IMPRESSION:  Nonocclusive deep vein thrombosis of the left deep femoral vein.  Acute deep venous thrombosis: above the knee.    Findings discussed with Dr. Silva on  2021 5:35 PM with read back.    --- End of Report ---        SHERRILL MCRAE MD; Attending Radiologist  This document has been electronically signed. Dec 19 2021  5:45PM    < end of copied text >        < from: Xray Chest 1 View-PORTABLE IMMEDIATE (21 @ 15:47) >  ACC: [id number] EXAM:  XR CHEST PORTABLE IMMED 1V                          PROCEDURE DATE:  2021          INTERPRETATION:  Sepsis.    AP chest. Prior 10/13/2015.    Heart magnified by AP film. Mild generalized nonspecific interstitial   prominence. Recommend clinical correlation and follow-up. No   consolidating infiltrate or pleural collection. Degenerative change   bilateral shoulders.    IMPRESSION: Mild nonspecific interstitial prominence without focal   infiltrates.    --- End of Report ---      ASIM GIRON MD; Attending Radiologist  This document has been electronically signed. Dec 19 2021  3:51PM    < end of copied text >        INVASIVE LINES: X   INDWELLING ABAD: X   VTE PROPHYLAXIS: Treatment dose Lovenox   CAM ICU: -   CODE STATUS: DNR DNI (see GOC note)         CRITICAL CARE TIME SPENT: 50 minutes spent performing frequent bedside reassessments and augmenting plan of care to address problems of acute critical illness that pose high probability of life threatening deterioration and/or end organ damage/dysfunction and discussing goals of care, non-inclusive of time spent on procedures performed.

## 2021-12-19 NOTE — PATIENT PROFILE ADULT - FALL HARM RISK - HARM RISK INTERVENTIONS

## 2021-12-19 NOTE — H&P ADULT - NSHPPHYSICALEXAM_GEN_ALL_CORE
-    Vital Signs Last 24 Hrs  T(C): 36.2 (19 Dec 2021 21:14), Max: 36.4 (19 Dec 2021 14:00)  T(F): 97.1 (19 Dec 2021 21:14), Max: 97.5 (19 Dec 2021 14:00)  HR: 114 (19 Dec 2021 22:18) (112 - 180)  BP: 125/84 (19 Dec 2021 22:18) (1/- - 156/87)  BP(mean): --  RR: 20 (19 Dec 2021 22:18) (16 - 28)  SpO2: 94% (19 Dec 2021 22:18) (92% - 98%)        PHYSICAL EXAM:  		  GENERAL: NAD, well-groomed, well-developed, obese.  HEAD:  Atraumatic, Norm cephalic.  EYES: PERRLA, conjunctiva clear.  ENMT: no nasal discharge, (+) B/L hearing aids, MMM.   NECK: Supple, No JVD.  NERVOUS SYSTEM:  Alert & oriented X3, neurologically intact grossly.  CHEST/LUNG: Fair air entry B/L, (+) B/L basal non cons insp rales, (+) late exp sib rhonchi with wheezing.  HEART: Variable S1 & acc S2, no murmurs, or extra sounds.  ABDOMEN: Soft, non-tender, obese, non-distended; bowel sounds present, no palpable masses or organomegaly.  EXTREMITIES:  No clubbing, cyanosis, or edema.  VASCULAR: 2+ radial, brachial pulses B/L, (+) carotid bruit.  SKIN: No rashes, (+) scars of skin excesions  PSYCH: normal affect & behavior.

## 2021-12-19 NOTE — H&P ADULT - PROBLEM SELECTOR PLAN 4
Decompensation ML 2ry to A. Fib with RVR, responded well to a single IVP dose of Lasix 40 mg & nebulized Albuterol/Ipratropium X1 dose, well tolerating Diltiazem, negative initial troponin, expected to continue being compensated just by rate control, trend troponin X1, TTE evaluation in am, cardiology consult as stated above.

## 2021-12-19 NOTE — H&P ADULT - PROBLEM SELECTOR PLAN 1
new onset, possibly related to a pulmonary thromboembolism, negative 1st troponin, received Diltiazem 10 mg IVP X1 dose by ED team & was started on Diltiazem infusion, continue in transition to PO Diltiazem, titrate for HR < 100/min, recheck troponin in am, TSH & TTE in am, cardiology consult with Dr. Myles was called earlier by ED team.

## 2021-12-19 NOTE — ED PROVIDER NOTE - CLINICAL SUMMARY MEDICAL DECISION MAKING FREE TEXT BOX
pt presenting with weakness found to be in A-fib with RVR. Will obtain screening labs, rate control, obtain venous duplex, cover with Lovenox and admit

## 2021-12-19 NOTE — H&P ADULT - NSHPSOCIALHISTORY_GEN_ALL_CORE
-    , lives at home, retired, former smoker, no ETOH or drug abuse. -    , lives alone at home, with HHA on weekdays only for 8h a day, retired, former smoker, no ETOH or drug abuse.

## 2021-12-19 NOTE — H&P ADULT - PROBLEM SELECTOR PLAN 9
already fully anticoagulated with LMWH, transition to a DOAC in am, no need for further VTE prophylaxis, started on PO Protonix 40 mg daily for GI prophylaxis.

## 2021-12-19 NOTE — CONSULT NOTE ADULT - ASSESSMENT
89 yo male, PMHx HTN, HLD, T2DM, stage III CKD, carotid stenosis, basal cell carcinoma, spinal stenosis with worsening debility over the past year, admitted with new onset AFib RVR, LLE DVT, suspected PE r/o acute cor pulmonale, c/b HARESH on CKD    Acute DVT likely related to increasing immobility due to worsening spinal stenosis. Full anticoagulation with Lovenox   AFib RVR, new onset related to PE? remains uncontrolled despite maximum dose Cardizem infusion  Add metoprolol, attempt to wean off Cardizem gtt  Clinical evidence of volume overload, BNP elevated, consistent with decompensated systolic vs diastolic CHF (tachycardia-induced cardiomyopathy vs cor pulmonale from submassive PE?)  Given Lasix 40mg IV x 1  Would benefit from CTA to eval for PE but will hold off given HARESH on CKD  Needs TTE to eval LVEF, RV pressures/function  Check electrolytes, supplementing to keep goal K >4, Mg >2, Phos >3 for optimal arrhythmia suppression  Trending troponins

## 2021-12-20 DIAGNOSIS — I10 ESSENTIAL (PRIMARY) HYPERTENSION: ICD-10-CM

## 2021-12-20 DIAGNOSIS — E78.5 HYPERLIPIDEMIA, UNSPECIFIED: ICD-10-CM

## 2021-12-20 DIAGNOSIS — I48.91 UNSPECIFIED ATRIAL FIBRILLATION: ICD-10-CM

## 2021-12-20 DIAGNOSIS — D72.829 ELEVATED WHITE BLOOD CELL COUNT, UNSPECIFIED: ICD-10-CM

## 2021-12-20 DIAGNOSIS — E11.9 TYPE 2 DIABETES MELLITUS WITHOUT COMPLICATIONS: ICD-10-CM

## 2021-12-20 DIAGNOSIS — I82.409 ACUTE EMBOLISM AND THROMBOSIS OF UNSPECIFIED DEEP VEINS OF UNSPECIFIED LOWER EXTREMITY: ICD-10-CM

## 2021-12-20 DIAGNOSIS — I50.31 ACUTE DIASTOLIC (CONGESTIVE) HEART FAILURE: ICD-10-CM

## 2021-12-20 DIAGNOSIS — Z29.9 ENCOUNTER FOR PROPHYLACTIC MEASURES, UNSPECIFIED: ICD-10-CM

## 2021-12-20 DIAGNOSIS — E66.9 OBESITY, UNSPECIFIED: ICD-10-CM

## 2021-12-20 LAB
A1C WITH ESTIMATED AVERAGE GLUCOSE RESULT: 7.1 % — HIGH (ref 4–5.6)
ALBUMIN SERPL ELPH-MCNC: 3 G/DL — LOW (ref 3.3–5)
ALP SERPL-CCNC: 115 U/L — SIGNIFICANT CHANGE UP (ref 30–120)
ALT FLD-CCNC: 86 U/L DA — HIGH (ref 10–60)
ANION GAP SERPL CALC-SCNC: 12 MMOL/L — SIGNIFICANT CHANGE UP (ref 5–17)
AST SERPL-CCNC: 34 U/L — SIGNIFICANT CHANGE UP (ref 10–40)
BASOPHILS # BLD AUTO: 0.05 K/UL — SIGNIFICANT CHANGE UP (ref 0–0.2)
BASOPHILS NFR BLD AUTO: 0.4 % — SIGNIFICANT CHANGE UP (ref 0–2)
BILIRUB SERPL-MCNC: 0.6 MG/DL — SIGNIFICANT CHANGE UP (ref 0.2–1.2)
BUN SERPL-MCNC: 57 MG/DL — HIGH (ref 7–23)
CALCIUM SERPL-MCNC: 8 MG/DL — LOW (ref 8.4–10.5)
CHLORIDE SERPL-SCNC: 107 MMOL/L — SIGNIFICANT CHANGE UP (ref 96–108)
CO2 SERPL-SCNC: 22 MMOL/L — SIGNIFICANT CHANGE UP (ref 22–31)
CREAT SERPL-MCNC: 1.79 MG/DL — HIGH (ref 0.5–1.3)
EOSINOPHIL # BLD AUTO: 0.18 K/UL — SIGNIFICANT CHANGE UP (ref 0–0.5)
EOSINOPHIL NFR BLD AUTO: 1.6 % — SIGNIFICANT CHANGE UP (ref 0–6)
ESTIMATED AVERAGE GLUCOSE: 157 MG/DL — HIGH (ref 68–114)
GLUCOSE BLDC GLUCOMTR-MCNC: 149 MG/DL — HIGH (ref 70–99)
GLUCOSE BLDC GLUCOMTR-MCNC: 169 MG/DL — HIGH (ref 70–99)
GLUCOSE BLDC GLUCOMTR-MCNC: 86 MG/DL — SIGNIFICANT CHANGE UP (ref 70–99)
GLUCOSE SERPL-MCNC: 152 MG/DL — HIGH (ref 70–99)
HCT VFR BLD CALC: 36.1 % — LOW (ref 39–50)
HGB BLD-MCNC: 11.8 G/DL — LOW (ref 13–17)
IMM GRANULOCYTES NFR BLD AUTO: 0.8 % — SIGNIFICANT CHANGE UP (ref 0–1.5)
LYMPHOCYTES # BLD AUTO: 1.86 K/UL — SIGNIFICANT CHANGE UP (ref 1–3.3)
LYMPHOCYTES # BLD AUTO: 16.4 % — SIGNIFICANT CHANGE UP (ref 13–44)
MAGNESIUM SERPL-MCNC: 2.3 MG/DL — SIGNIFICANT CHANGE UP (ref 1.6–2.6)
MCHC RBC-ENTMCNC: 29.9 PG — SIGNIFICANT CHANGE UP (ref 27–34)
MCHC RBC-ENTMCNC: 32.7 GM/DL — SIGNIFICANT CHANGE UP (ref 32–36)
MCV RBC AUTO: 91.4 FL — SIGNIFICANT CHANGE UP (ref 80–100)
MONOCYTES # BLD AUTO: 0.91 K/UL — HIGH (ref 0–0.9)
MONOCYTES NFR BLD AUTO: 8 % — SIGNIFICANT CHANGE UP (ref 2–14)
NEUTROPHILS # BLD AUTO: 8.26 K/UL — HIGH (ref 1.8–7.4)
NEUTROPHILS NFR BLD AUTO: 72.8 % — SIGNIFICANT CHANGE UP (ref 43–77)
NRBC # BLD: 0 /100 WBCS — SIGNIFICANT CHANGE UP (ref 0–0)
PHOSPHATE SERPL-MCNC: 4.7 MG/DL — HIGH (ref 2.5–4.5)
PLATELET # BLD AUTO: 285 K/UL — SIGNIFICANT CHANGE UP (ref 150–400)
POTASSIUM SERPL-MCNC: 4.6 MMOL/L — SIGNIFICANT CHANGE UP (ref 3.5–5.3)
POTASSIUM SERPL-SCNC: 4.6 MMOL/L — SIGNIFICANT CHANGE UP (ref 3.5–5.3)
PROCALCITONIN SERPL-MCNC: 0.1 NG/ML — SIGNIFICANT CHANGE UP (ref 0.02–0.1)
PROT SERPL-MCNC: 6.2 G/DL — SIGNIFICANT CHANGE UP (ref 6–8.3)
RBC # BLD: 3.95 M/UL — LOW (ref 4.2–5.8)
RBC # FLD: 14.3 % — SIGNIFICANT CHANGE UP (ref 10.3–14.5)
SODIUM SERPL-SCNC: 141 MMOL/L — SIGNIFICANT CHANGE UP (ref 135–145)
TROPONIN I, HIGH SENSITIVITY RESULT: 29 NG/L — SIGNIFICANT CHANGE UP
TSH SERPL-MCNC: 0.9 UIU/ML — SIGNIFICANT CHANGE UP (ref 0.27–4.2)
WBC # BLD: 11.35 K/UL — HIGH (ref 3.8–10.5)
WBC # FLD AUTO: 11.35 K/UL — HIGH (ref 3.8–10.5)

## 2021-12-20 RX ORDER — DIGOXIN 250 MCG
125 TABLET ORAL DAILY
Refills: 0 | Status: DISCONTINUED | OUTPATIENT
Start: 2021-12-21 | End: 2021-12-23

## 2021-12-20 RX ORDER — PANTOPRAZOLE SODIUM 20 MG/1
40 TABLET, DELAYED RELEASE ORAL
Refills: 0 | Status: DISCONTINUED | OUTPATIENT
Start: 2021-12-20 | End: 2021-12-23

## 2021-12-20 RX ORDER — DILTIAZEM HCL 120 MG
10 CAPSULE, EXT RELEASE 24 HR ORAL
Qty: 125 | Refills: 0 | Status: DISCONTINUED | OUTPATIENT
Start: 2021-12-20 | End: 2021-12-22

## 2021-12-20 RX ORDER — DIGOXIN 250 MCG
250 TABLET ORAL EVERY 6 HOURS
Refills: 0 | Status: COMPLETED | OUTPATIENT
Start: 2021-12-20 | End: 2021-12-20

## 2021-12-20 RX ADMIN — Medication 250 MICROGRAM(S): at 08:45

## 2021-12-20 RX ADMIN — Medication 10 MG/HR: at 18:57

## 2021-12-20 RX ADMIN — Medication 250 MICROGRAM(S): at 16:00

## 2021-12-20 RX ADMIN — Medication 50 MILLIGRAM(S): at 18:57

## 2021-12-20 RX ADMIN — ENOXAPARIN SODIUM 100 MILLIGRAM(S): 100 INJECTION SUBCUTANEOUS at 06:07

## 2021-12-20 RX ADMIN — PANTOPRAZOLE SODIUM 40 MILLIGRAM(S): 20 TABLET, DELAYED RELEASE ORAL at 06:07

## 2021-12-20 RX ADMIN — ENOXAPARIN SODIUM 100 MILLIGRAM(S): 100 INJECTION SUBCUTANEOUS at 18:58

## 2021-12-20 RX ADMIN — Medication 10 MG/HR: at 01:03

## 2021-12-20 RX ADMIN — INSULIN GLARGINE 10 UNIT(S): 100 INJECTION, SOLUTION SUBCUTANEOUS at 08:47

## 2021-12-20 RX ADMIN — Medication 50 MILLIGRAM(S): at 06:07

## 2021-12-20 RX ADMIN — Medication 2: at 18:53

## 2021-12-20 RX ADMIN — GABAPENTIN 100 MILLIGRAM(S): 400 CAPSULE ORAL at 12:39

## 2021-12-20 NOTE — CONSULT NOTE ADULT - ASSESSMENT
The patient is a 89 year old male with a history of HTN, HL, DM, CKD, carotid stenosis who presents with vomiting, found to have rapid atrial fibrillation, acute DVT.    Plan:  - Continue diltiazem drip  - Continue metoprolol tartrate 50 mg bid  - Add digoxin 0.25 mg IV q6h x2 doses and then 0.125 mg PO daily  - Wean off diltiazem drip if possible  - Hold amlodipine  - LE venous duplex with left leg DVT  - Presumably also PE although not confirmed  - On full dose enoxaparin - eventual transition to DOAC  - Will discontinue clopidogrel for now while on anticoagulation  - Check echo

## 2021-12-20 NOTE — CONSULT NOTE ADULT - SUBJECTIVE AND OBJECTIVE BOX
History of Present Illness: The patient is a 89 year old male with a history of HTN, HL, DM, CKD, carotid stenosis who presents with vomiting. He states that he has slowly been declining from a physical standpoint and has difficulty walking with the walker when he is alone. Yesterday he had nausea and vomiting. Some shortness of breath and leg swelling. No chest pain, dizziness, palpitations. In ED, he was noted to be in rapid atrial fibrillation.    Past Medical/Surgical History:  HTN, HL, DM, CKD, carotid stenosis    Medications:  Home Medications:  amLODIPine 5 mg oral tablet: 1 tab(s) orally once a day (19 Dec 2021 18:11)  atorvastatin 20 mg oral tablet: 1 tab(s) orally once a day (19 Dec 2021 18:11)  doxycycline hyclate 100 mg oral capsule: 1 cap(s) orally 2 times a day (19 Dec 2021 18:11)  gabapentin 100 mg oral capsule: 1 cap(s) orally once a day (19 Dec 2021 18:11)  Januvia 50 mg oral tablet: 1 tab(s) orally once a day (19 Dec 2021 18:11)  Plavix: 37.5 milligram(s) orally once a day (19 Dec 2021 18:11)  trajenta: 5 milligram(s) orally once a day (19 Dec 2021 18:11)      Family History: Non-contributory family history of premature cardiovascular atherosclerotic disease    Social History: No tobacco, alcohol or drug use    Review of Systems:  General: No fevers, chills, weight gain  Skin: No rashes, color changes  Cardiovascular: No chest pain, orthopnea  Respiratory: No shortness of breath, cough  Gastrointestinal: No nausea, abdominal pain  Genitourinary: No incontinence, pain with urination  Musculoskeletal: No pain, swelling, decreased range of motion  Neurological: No headache, weakness  Psychiatric: No depression, anxiety  Endocrine: No weight gain, increased thirst  All other systems are comprehensively negative.    Physical Exam:  Vitals:        Vital Signs Last 24 Hrs  T(C): 36.3 (20 Dec 2021 05:00), Max: 36.4 (19 Dec 2021 14:00)  T(F): 97.4 (20 Dec 2021 05:00), Max: 97.5 (19 Dec 2021 14:00)  HR: 112 (20 Dec 2021 07:00) (101 - 180)  BP: 130/79 (20 Dec 2021 07:00) (1/- - 156/87)  BP(mean): --  RR: 25 (20 Dec 2021 07:00) (16 - 30)  SpO2: 96% (20 Dec 2021 07:00) (92% - 98%)  General: NAD  HEENT: MMM  Neck: No JVD, no carotid bruit  Lungs: CTAB  CV: RRR, nl S1/S2, no M/R/G  Abdomen: S/NT/ND, +BS  Extremities: No LE edema, no cyanosis  Neuro: AAOx3, non-focal  Skin: No rash    Labs:                        11.8   11.35 )-----------( 285      ( 20 Dec 2021 06:22 )             36.1     12-20    141  |  107  |  57<H>  ----------------------------<  152<H>  4.6   |  22  |  1.79<H>    Ca    8.0<L>      20 Dec 2021 06:22  Phos  4.7     12-20  Mg     2.3     12-20    TPro  6.2  /  Alb  3.0<L>  /  TBili  0.6  /  DBili  x   /  AST  34  /  ALT  86<H>  /  AlkPhos  115  12-20        PT/INR - ( 19 Dec 2021 16:14 )   PT: 14.3 sec;   INR: 1.19 ratio         PTT - ( 19 Dec 2021 16:14 )  PTT:31.6 sec    ECG: AF, LAD, incomplete RBBB    Telemetry: AF

## 2021-12-20 NOTE — CONSULT NOTE ADULT - SUBJECTIVE AND OBJECTIVE BOX
Date/Time Patient Seen:  		  Referring MD:   Data Reviewed	       Patient is a 89y old  Male who presents with a chief complaint of Generalized weakness. (19 Dec 2021 23:27)      Subjective/HPI  vs noted  ICU eval noted  H and P reviewed  US doppler with DVT  CXR with pulm edema  old records reviewed     This is a 89 y/o M with PMH of HTN, Type 2 DM, Dyslipidemia, PAD, CKD stage 3, Melanoma in situ s/p excision, Diverticulitis, Retinal Detachment s/p repair, OA, and Obesity who presented with a progressive generalized weakness with back aches over the past week associated with anorexia & intermittent nausea. Saw his PCP with some w/u, and this am he tried to eat "anything", but vomited immediately after eating some bread & butter, so he became concerned & called his PCP who recommended going to the hospital. No Fever or chills, also denies any chest pain, palpitation awareness, SOB, or lower extremity pain. AT the ED he was found to have a left deep femoral DVT, also was in A. Fib with RVR. Patient denies awareness of any history of cardiac disease.  PAST MEDICAL & SURGICAL HISTORY:  HLD (hyperlipidemia)    HTN (hypertension)    Carotid artery disorder  Left carotid congenital anomaly    Osteoarthritis    Melanoma in situ  left thigh    Type 2 diabetes mellitus without complication    History of basal cell carcinoma (BCC)    Basal cell cancer  Moh&#x27;s procedure 4/08/2014    S/P lumbar laminectomy    Retinal detachment  repair - right eye    Dupuytrens contracture  release - both hands    FAMILY HISTORY:  Mother  Still living? Unknown  Family history of diabetes mellitus, Age at diagnosis: Age Unknown.     Social History:  Social History (marital status, living situation, occupation, tobacco use, alcohol and drug use, and sexual history): -    , lives alone at home, with HHA on weekdays only for 8h a day, retired, former smoker, no ETOH or drug abuse.     Tobacco Screening:  · Core Measure Site	No  · Has the patient used tobacco in the past 30 days?	No    Risk Assessment:    Present on Admission:  Deep Venous Thrombosis	yes  Pulmonary Embolus	suspected  Urinary Catheter	no  Central Venous Catheter/PICC Line	no  Surgical Site Incision	no  Pressure Ulcer(s)	no     Heart Failure:  Does this patient have a history of or has been diagnosed with heart failure? unknown.      Medication list         MEDICATIONS  (STANDING):  atorvastatin 20 milliGRAM(s) Oral at bedtime  clopidogrel Tablet 37.5 milliGRAM(s) Oral daily  dextrose 40% Gel 15 Gram(s) Oral once  dextrose 5%. 1000 milliLiter(s) (50 mL/Hr) IV Continuous <Continuous>  dextrose 5%. 1000 milliLiter(s) (100 mL/Hr) IV Continuous <Continuous>  dextrose 50% Injectable 25 Gram(s) IV Push once  dextrose 50% Injectable 12.5 Gram(s) IV Push once  dextrose 50% Injectable 25 Gram(s) IV Push once  diltiazem Infusion 10 mG/Hr (10 mL/Hr) IV Continuous <Continuous>  enoxaparin Injectable 100 milliGRAM(s) SubCutaneous two times a day  gabapentin 100 milliGRAM(s) Oral daily  glucagon  Injectable 1 milliGRAM(s) IntraMuscular once  insulin glargine Injectable (LANTUS) 10 Unit(s) SubCutaneous every morning  insulin lispro (ADMELOG) corrective regimen sliding scale   SubCutaneous three times a day before meals  insulin lispro (ADMELOG) corrective regimen sliding scale   SubCutaneous at bedtime  metoprolol tartrate 50 milliGRAM(s) Oral two times a day  pantoprazole    Tablet 40 milliGRAM(s) Oral before breakfast    MEDICATIONS  (PRN):         Vitals log        ICU Vital Signs Last 24 Hrs  T(C): 36.3 (20 Dec 2021 05:00), Max: 36.4 (19 Dec 2021 14:00)  T(F): 97.4 (20 Dec 2021 05:00), Max: 97.5 (19 Dec 2021 14:00)  HR: 101 (20 Dec 2021 06:00) (101 - 180)  BP: 107/81 (20 Dec 2021 06:00) (1/- - 156/87)  BP(mean): --  ABP: --  ABP(mean): --  RR: 22 (20 Dec 2021 05:00) (16 - 30)  SpO2: 98% (20 Dec 2021 06:00) (92% - 98%)           Input and Output:  I&O's Detail    19 Dec 2021 07:01  -  20 Dec 2021 06:59  --------------------------------------------------------  IN:    Diltiazem: 215 mL  Total IN: 215 mL    OUT:    Voided (mL): 650 mL  Total OUT: 650 mL    Total NET: -435 mL          Lab Data                        11.8   11.35 )-----------( 285      ( 20 Dec 2021 06:22 )             36.1     12-19    141  |  107  |  61<H>  ----------------------------<  146<H>  4.7   |  21<L>  |  1.73<H>    Ca    8.0<L>      19 Dec 2021 16:14    TPro  6.3  /  Alb  3.1<L>  /  TBili  0.7  /  DBili  x   /  AST  36  /  ALT  76<H>  /  AlkPhos  128<H>  12-19            Review of Systems	      Objective     Physical Examination        Pertinent Lab findings & Imaging      [place]:  NO   Adequate UO     I&O's Detail    19 Dec 2021 07:01  -  20 Dec 2021 06:59  --------------------------------------------------------  IN:    Diltiazem: 215 mL  Total IN: 215 mL    OUT:    Voided (mL): 650 mL  Total OUT: 650 mL    Total NET: -435 mL               Discussed with:     Cultures:	        Radiology      ACC: [id number] EXAM:  US DPLX LWR EXT VEINS COMPL BI                          PROCEDURE DATE:  12/19/2021          INTERPRETATION:  CLINICAL INFORMATION: Leg pain and swelling.    COMPARISON: Venous Doppler dated 10/14/2015.    TECHNIQUE: Duplex sonography of the BILATERAL LOWER extremity veins with   color and spectral Doppler, with and without compression.    FINDINGS:    RIGHT:  Normal compressibility of the RIGHT common femoral, femoral and popliteal   veins.  Doppler examination shows normal spontaneous and phasic flow.  No RIGHT calf vein thrombosis is detected.    LEFT:    There is nonocclusive thrombus within the left deep femoral vein.  Normal compressibility of the LEFT common femoral, femoral and popliteal   veins.  Doppler examination shows normal spontaneous and phasic flow.  No LEFT calf vein thrombosis is detected.    IMPRESSION:  Nonocclusive deep vein thrombosis of the left deep femoral vein.  Acute deep venous thrombosis: above the knee.    Findings discussed with Dr. Silva on  12/19/2021 5:35 PM with read back.    --- End of Report ---            SHERRILL MCRAE MD; Attending Radiologist  This document has been electronically signed. Dec 19 2021  5:45PM          ACC: [id number] EXAM:  XR CHEST PORTABLE IMMED 1V                          PROCEDURE DATE:  12/19/2021          INTERPRETATION:  Sepsis.    AP chest. Prior 10/13/2015.    Heart magnified by AP film. Mild generalized nonspecific interstitial   prominence. Recommend clinical correlation and follow-up. No   consolidating infiltrate or pleural collection. Degenerative change   bilateral shoulders.    IMPRESSION: Mild nonspecific interstitial prominence without focal   infiltrates.    --- End of Report ---            ASIM GIRON MD; Attending Radiologist                 Date/Time Patient Seen:  		  Referring MD:   Data Reviewed	       Patient is a 89y old  Male who presents with a chief complaint of Generalized weakness. (19 Dec 2021 23:27)      Subjective/HPI  vs noted  ICU eval noted  H and P reviewed  US doppler with DVT  CXR with pulm edema  old records reviewed     This is a 89 y/o M with PMH of HTN, Type 2 DM, Dyslipidemia, PAD, CKD stage 3, Melanoma in situ s/p excision, Diverticulitis, Retinal Detachment s/p repair, OA, and Obesity who presented with a progressive generalized weakness with back aches over the past week associated with anorexia & intermittent nausea. Saw his PCP with some w/u, and this am he tried to eat "anything", but vomited immediately after eating some bread & butter, so he became concerned & called his PCP who recommended going to the hospital. No Fever or chills, also denies any chest pain, palpitation awareness, SOB, or lower extremity pain. AT the ED he was found to have a left deep femoral DVT, also was in A. Fib with RVR. Patient denies awareness of any history of cardiac disease.  PAST MEDICAL & SURGICAL HISTORY:  HLD (hyperlipidemia)    HTN (hypertension)    Carotid artery disorder  Left carotid congenital anomaly    Osteoarthritis    Melanoma in situ  left thigh    Type 2 diabetes mellitus without complication    History of basal cell carcinoma (BCC)    Basal cell cancer  Moh&#x27;s procedure 4/08/2014    S/P lumbar laminectomy    Retinal detachment  repair - right eye    Dupuytrens contracture  release - both hands    FAMILY HISTORY:  Mother  Still living? Unknown  Family history of diabetes mellitus, Age at diagnosis: Age Unknown.     Social History:  Social History (marital status, living situation, occupation, tobacco use, alcohol and drug use, and sexual history): -    , lives alone at home, with HHA on weekdays only for 8h a day, retired, former smoker, no ETOH or drug abuse.     Tobacco Screening:  · Core Measure Site	No  · Has the patient used tobacco in the past 30 days?	No    Risk Assessment:    Present on Admission:  Deep Venous Thrombosis	yes  Pulmonary Embolus	suspected  Urinary Catheter	no  Central Venous Catheter/PICC Line	no  Surgical Site Incision	no  Pressure Ulcer(s)	no     Heart Failure:  Does this patient have a history of or has been diagnosed with heart failure? unknown.      Medication list         MEDICATIONS  (STANDING):  atorvastatin 20 milliGRAM(s) Oral at bedtime  clopidogrel Tablet 37.5 milliGRAM(s) Oral daily  dextrose 40% Gel 15 Gram(s) Oral once  dextrose 5%. 1000 milliLiter(s) (50 mL/Hr) IV Continuous <Continuous>  dextrose 5%. 1000 milliLiter(s) (100 mL/Hr) IV Continuous <Continuous>  dextrose 50% Injectable 25 Gram(s) IV Push once  dextrose 50% Injectable 12.5 Gram(s) IV Push once  dextrose 50% Injectable 25 Gram(s) IV Push once  diltiazem Infusion 10 mG/Hr (10 mL/Hr) IV Continuous <Continuous>  enoxaparin Injectable 100 milliGRAM(s) SubCutaneous two times a day  gabapentin 100 milliGRAM(s) Oral daily  glucagon  Injectable 1 milliGRAM(s) IntraMuscular once  insulin glargine Injectable (LANTUS) 10 Unit(s) SubCutaneous every morning  insulin lispro (ADMELOG) corrective regimen sliding scale   SubCutaneous three times a day before meals  insulin lispro (ADMELOG) corrective regimen sliding scale   SubCutaneous at bedtime  metoprolol tartrate 50 milliGRAM(s) Oral two times a day  pantoprazole    Tablet 40 milliGRAM(s) Oral before breakfast    MEDICATIONS  (PRN):         Vitals log        ICU Vital Signs Last 24 Hrs  T(C): 36.3 (20 Dec 2021 05:00), Max: 36.4 (19 Dec 2021 14:00)  T(F): 97.4 (20 Dec 2021 05:00), Max: 97.5 (19 Dec 2021 14:00)  HR: 101 (20 Dec 2021 06:00) (101 - 180)  BP: 107/81 (20 Dec 2021 06:00) (1/- - 156/87)  BP(mean): --  ABP: --  ABP(mean): --  RR: 22 (20 Dec 2021 05:00) (16 - 30)  SpO2: 98% (20 Dec 2021 06:00) (92% - 98%)           Input and Output:  I&O's Detail    19 Dec 2021 07:01  -  20 Dec 2021 06:59  --------------------------------------------------------  IN:    Diltiazem: 215 mL  Total IN: 215 mL    OUT:    Voided (mL): 650 mL  Total OUT: 650 mL    Total NET: -435 mL          Lab Data                        11.8   11.35 )-----------( 285      ( 20 Dec 2021 06:22 )             36.1     12-19    141  |  107  |  61<H>  ----------------------------<  146<H>  4.7   |  21<L>  |  1.73<H>    Ca    8.0<L>      19 Dec 2021 16:14    TPro  6.3  /  Alb  3.1<L>  /  TBili  0.7  /  DBili  x   /  AST  36  /  ALT  76<H>  /  AlkPhos  128<H>  12-19            Review of Systems	  weakness  pain  sob  mendoza      Objective     Physical Examination  heart s1s2  lung dec BS  abd soft  head nc  on o2 support  verbal  weak      Pertinent Lab findings & Imaging      [place]:  NO   Adequate UO     I&O's Detail    19 Dec 2021 07:01  -  20 Dec 2021 06:59  --------------------------------------------------------  IN:    Diltiazem: 215 mL  Total IN: 215 mL    OUT:    Voided (mL): 650 mL  Total OUT: 650 mL    Total NET: -435 mL               Discussed with:     Cultures:	        Radiology      ACC: [id number] EXAM:  US DPLX LWR EXT VEINS COMPL BI                          PROCEDURE DATE:  12/19/2021          INTERPRETATION:  CLINICAL INFORMATION: Leg pain and swelling.    COMPARISON: Venous Doppler dated 10/14/2015.    TECHNIQUE: Duplex sonography of the BILATERAL LOWER extremity veins with   color and spectral Doppler, with and without compression.    FINDINGS:    RIGHT:  Normal compressibility of the RIGHT common femoral, femoral and popliteal   veins.  Doppler examination shows normal spontaneous and phasic flow.  No RIGHT calf vein thrombosis is detected.    LEFT:    There is nonocclusive thrombus within the left deep femoral vein.  Normal compressibility of the LEFT common femoral, femoral and popliteal   veins.  Doppler examination shows normal spontaneous and phasic flow.  No LEFT calf vein thrombosis is detected.    IMPRESSION:  Nonocclusive deep vein thrombosis of the left deep femoral vein.  Acute deep venous thrombosis: above the knee.    Findings discussed with Dr. Silva on  12/19/2021 5:35 PM with read back.    --- End of Report ---            SHERRILL MCRAE MD; Attending Radiologist  This document has been electronically signed. Dec 19 2021  5:45PM          ACC: [id number] EXAM:  XR CHEST PORTABLE IMMED 1V                          PROCEDURE DATE:  12/19/2021          INTERPRETATION:  Sepsis.    AP chest. Prior 10/13/2015.    Heart magnified by AP film. Mild generalized nonspecific interstitial   prominence. Recommend clinical correlation and follow-up. No   consolidating infiltrate or pleural collection. Degenerative change   bilateral shoulders.    IMPRESSION: Mild nonspecific interstitial prominence without focal   infiltrates.    --- End of Report ---            ASIM GIRON MD; Attending Radiologist

## 2021-12-20 NOTE — CONSULT NOTE ADULT - ASSESSMENT
89 y/o M with PMH of HTN, Type 2 DM, Dyslipidemia, PAD, CKD stage 3, Melanoma in situ s/p excision, Diverticulitis, Retinal Detachment s/p repair, OA, and Obesity presented with generalized weakness, found to have acute DVT and in A Fib with RVR. 89 y/o M with PMH of HTN, Type 2 DM, Dyslipidemia, PAD, CKD stage 3, Melanoma in situ s/p excision, Diverticulitis, Retinal Detachment s/p repair, OA, and Obesity presented with generalized weakness, found to have acute DVT and in A Fib with RVR.    cardizem -   rate control  AF management  Cardio following  on AC   I and O  monitor for vol overload - CHF sx  on Lovenox AC  TTE - report pending -     DVT   on lovenox  eventually DOAC - renal dosed    CKD   serial renal indices  I and O  monitor labs and lytes    CXR c/w Pulm edema  proBNP noted  Cardio following  judicious diuresis  TTE -     GOC discussion  o2 support - keep sat > 88 pct  monitor VS and HD and Sat  assist with needs - ADL  monitored unit care and management

## 2021-12-21 LAB
ANION GAP SERPL CALC-SCNC: 11 MMOL/L — SIGNIFICANT CHANGE UP (ref 5–17)
BUN SERPL-MCNC: 63 MG/DL — HIGH (ref 7–23)
CALCIUM SERPL-MCNC: 8.1 MG/DL — LOW (ref 8.4–10.5)
CHLORIDE SERPL-SCNC: 107 MMOL/L — SIGNIFICANT CHANGE UP (ref 96–108)
CO2 SERPL-SCNC: 22 MMOL/L — SIGNIFICANT CHANGE UP (ref 22–31)
CREAT SERPL-MCNC: 2.01 MG/DL — HIGH (ref 0.5–1.3)
GLUCOSE BLDC GLUCOMTR-MCNC: 118 MG/DL — HIGH (ref 70–99)
GLUCOSE BLDC GLUCOMTR-MCNC: 171 MG/DL — HIGH (ref 70–99)
GLUCOSE BLDC GLUCOMTR-MCNC: 87 MG/DL — SIGNIFICANT CHANGE UP (ref 70–99)
GLUCOSE SERPL-MCNC: 120 MG/DL — HIGH (ref 70–99)
POTASSIUM SERPL-MCNC: 4.6 MMOL/L — SIGNIFICANT CHANGE UP (ref 3.5–5.3)
POTASSIUM SERPL-SCNC: 4.6 MMOL/L — SIGNIFICANT CHANGE UP (ref 3.5–5.3)
SODIUM SERPL-SCNC: 140 MMOL/L — SIGNIFICANT CHANGE UP (ref 135–145)

## 2021-12-21 RX ORDER — APIXABAN 2.5 MG/1
2.5 TABLET, FILM COATED ORAL EVERY 12 HOURS
Refills: 0 | Status: DISCONTINUED | OUTPATIENT
Start: 2021-12-21 | End: 2021-12-23

## 2021-12-21 RX ORDER — DILTIAZEM HCL 120 MG
60 CAPSULE, EXT RELEASE 24 HR ORAL EVERY 6 HOURS
Refills: 0 | Status: DISCONTINUED | OUTPATIENT
Start: 2021-12-21 | End: 2021-12-22

## 2021-12-21 RX ADMIN — Medication 50 MILLIGRAM(S): at 17:50

## 2021-12-21 RX ADMIN — Medication 60 MILLIGRAM(S): at 14:28

## 2021-12-21 RX ADMIN — INSULIN GLARGINE 10 UNIT(S): 100 INJECTION, SOLUTION SUBCUTANEOUS at 08:35

## 2021-12-21 RX ADMIN — APIXABAN 2.5 MILLIGRAM(S): 2.5 TABLET, FILM COATED ORAL at 17:50

## 2021-12-21 RX ADMIN — Medication 0: at 07:45

## 2021-12-21 RX ADMIN — Medication 60 MILLIGRAM(S): at 08:35

## 2021-12-21 RX ADMIN — Medication 10 MG/HR: at 07:00

## 2021-12-21 RX ADMIN — Medication 125 MICROGRAM(S): at 07:18

## 2021-12-21 RX ADMIN — Medication 60 MILLIGRAM(S): at 19:51

## 2021-12-21 RX ADMIN — ATORVASTATIN CALCIUM 20 MILLIGRAM(S): 80 TABLET, FILM COATED ORAL at 21:50

## 2021-12-21 RX ADMIN — Medication 50 MILLIGRAM(S): at 06:43

## 2021-12-21 RX ADMIN — Medication 0: at 11:18

## 2021-12-21 RX ADMIN — GABAPENTIN 100 MILLIGRAM(S): 400 CAPSULE ORAL at 11:33

## 2021-12-21 RX ADMIN — PANTOPRAZOLE SODIUM 40 MILLIGRAM(S): 20 TABLET, DELAYED RELEASE ORAL at 06:43

## 2021-12-21 RX ADMIN — ATORVASTATIN CALCIUM 20 MILLIGRAM(S): 80 TABLET, FILM COATED ORAL at 00:58

## 2021-12-21 RX ADMIN — ENOXAPARIN SODIUM 100 MILLIGRAM(S): 100 INJECTION SUBCUTANEOUS at 06:43

## 2021-12-21 NOTE — PHYSICAL THERAPY INITIAL EVALUATION ADULT - ADDITIONAL COMMENTS
Pt lives alone in a house w/ a straight lift to enter.  Pt has a chairlift for every level in house at home.  Pt has a home health aide 9-5pm  on Monday and Wednesday, 9-3 on on Tuesday, Thursday and Friday,  12 pm-4 pm on Saturday and Sunday.  Pt son Sabino is going to stay with patient for the next couple of weeks

## 2021-12-21 NOTE — PHYSICAL THERAPY INITIAL EVALUATION ADULT - GAIT DEVIATIONS NOTED, PT EVAL
decreased christa/decreased velocity of limb motion/decreased step length/decreased stride length/decreased weight-shifting ability

## 2021-12-21 NOTE — DIETITIAN INITIAL EVALUATION ADULT. - OTHER INFO
Per H&P, pt is a "89 y/o M with PMH of HTN, Type 2 DM, Dyslipidemia, PAD, CKD stage 3, Melanoma in situ s/p excision, Diverticulitis, Retinal Detachment s/p repair, OA, and Obesity who presented with a progressive generalized weakness with back aches over the past week associated with anorexia & intermittent nausea. Saw his PCP with some w/u, and this am he tried to eat "anything", but vomited immediately after eating some bread & butter, so he became concerned & called his PCP who recommended going to the hospital. No Fever or chills, also denies any chest pain, palpitation awareness, SOB, or lower extremity pain. AT the ED he was found to have a left deep femoral DVT, also was in A. Fib with RVR. Patient denies awareness of any history of cardiac disease."    Pt seen for nutrition assessment secondary to reports of decreased appetite and nausea x 1 week PTA.  Pt visited while sitting up in bed.  Pt presently on Con CHO DASH/TLC diet with reported improved appetite and intake; denies nausea.  Meal preferences obtained to optimize po intake and tolerance.  Pt with dentures, tolerating consistency.  PTA pt reports he lives at home alone but has aide that comes for a few hours per day to assist (M-F, alone on weekends).  Pt reports decreased appetite the last month or so but is unsure why; states he was still eating at mealtimes but consuming smaller portions.  Per pt, #, however, recently gained 10# unintentionally (noted hx HF, however, no edema indicated per emr).  Pt with hx DM2 (current A1c 7.1%, acceptable secondary to advanced age), on oral DM meds PTA per home med review.  Interview limited secondary to pt being preoccupied with missing jacket (staff aware already) as well as new cardiac diagnosis.  RD to follow up and will continue to monitor pt's nutrition status.

## 2021-12-21 NOTE — DIETITIAN INITIAL EVALUATION ADULT. - PROBLEM SELECTOR PLAN 5
controlled, hold Tradjenta & Januvia, started him on Lantus [id number] units subcutaneously daily in addition to insulin Lispro on a sliding scale before meals & at bedtime, adjust the Lantus dose based on total daily insulin requirements, continue low dose Gabapentin for neuropathy. Patient not on any ACEIs or ARBs, will not add any given his carotid stenosis & advanced kidney dysfunction. Glycohemoglobin level in am.

## 2021-12-21 NOTE — ED ADULT NURSE REASSESSMENT NOTE - INTERVENTIONS DEFINITIONS
[place] to call system/Instruct patient to call for assistance/Non-slip footwear when patient is off stretcher/Physically safe environment: no spills, clutter or unnecessary equipment/Stretcher in lowest position, wheels locked, appropriate side rails in place/Monitor gait and stability/Monitor for mental status changes and reorient to person, place, and time/Review medications for side effects contributing to fall risk/Reinforce activity limits and safety measures with patient and family

## 2021-12-21 NOTE — PHYSICAL THERAPY INITIAL EVALUATION ADULT - PERTINENT HX OF CURRENT PROBLEM, REHAB EVAL
This is a 89 y/o M who presented with a progressive generalized weakness with back aches over the past week associated with anorexia & intermittent nausea.  US Duplex BLEs: Nonocclusive deep vein thrombosis of the left deep femoral vein.  Pt with Afib with RVR

## 2021-12-21 NOTE — ED ADULT NURSE REASSESSMENT NOTE - NS ED NURSE REASSESS COMMENT FT1
ate breakfast and tolerated ekg strip attached
pt received on stretcher skin warm and dry no distress monitor a fib no ectopy pt denies dizziness denies chest pains comfortable offers no c/o at present om cardizem drip at 10mg/hr rate fluctuates 90 to 130's hospitalist and cardiologist aware
pump constantly alarms due to iv site and second iv started to right hand and infusing cardizem. pt is a lefty and sets off pump
pt received aox3 resting on stretcher, cardiac monitoring ongoing, bedside echo completed, pt denies chest pain or any other complaints at this time. IV cardizem infusing via pump. awaiting for SPCU bed availability.
pt with equal strength b/l to both arms and legs
pt on hospital bed and has alarms in place
equal strength b/l.  denies pains. PT attempted to ambulate pt but pt /o feeling weak and shaky and returned to bed no chest pains

## 2021-12-21 NOTE — DIETITIAN INITIAL EVALUATION ADULT. - PERTINENT MEDS FT
MEDICATIONS  (STANDING):  apixaban 2.5 milliGRAM(s) Oral every 12 hours  atorvastatin 20 milliGRAM(s) Oral at bedtime  dextrose 40% Gel 15 Gram(s) Oral once  dextrose 5%. 1000 milliLiter(s) (50 mL/Hr) IV Continuous <Continuous>  dextrose 5%. 1000 milliLiter(s) (100 mL/Hr) IV Continuous <Continuous>  dextrose 50% Injectable 25 Gram(s) IV Push once  dextrose 50% Injectable 12.5 Gram(s) IV Push once  dextrose 50% Injectable 25 Gram(s) IV Push once  digoxin     Tablet 125 MICROGram(s) Oral daily  diltiazem    Tablet 60 milliGRAM(s) Oral every 6 hours  diltiazem Infusion 10 mG/Hr (10 mL/Hr) IV Continuous <Continuous>  gabapentin 100 milliGRAM(s) Oral daily  glucagon  Injectable 1 milliGRAM(s) IntraMuscular once  insulin glargine Injectable (LANTUS) 10 Unit(s) SubCutaneous every morning  insulin lispro (ADMELOG) corrective regimen sliding scale   SubCutaneous three times a day before meals  insulin lispro (ADMELOG) corrective regimen sliding scale   SubCutaneous at bedtime  metoprolol tartrate 50 milliGRAM(s) Oral two times a day  pantoprazole    Tablet 40 milliGRAM(s) Oral before breakfast    MEDICATIONS  (PRN):

## 2021-12-22 ENCOUNTER — TRANSCRIPTION ENCOUNTER (OUTPATIENT)
Age: 86
End: 2021-12-22

## 2021-12-22 LAB
-  AMPICILLIN: SIGNIFICANT CHANGE UP
-  CIPROFLOXACIN: SIGNIFICANT CHANGE UP
-  LEVOFLOXACIN: SIGNIFICANT CHANGE UP
-  NITROFURANTOIN: SIGNIFICANT CHANGE UP
-  TETRACYCLINE: SIGNIFICANT CHANGE UP
-  VANCOMYCIN: SIGNIFICANT CHANGE UP
ANION GAP SERPL CALC-SCNC: 12 MMOL/L — SIGNIFICANT CHANGE UP (ref 5–17)
BUN SERPL-MCNC: 61 MG/DL — HIGH (ref 7–23)
CALCIUM SERPL-MCNC: 7.9 MG/DL — LOW (ref 8.4–10.5)
CHLORIDE SERPL-SCNC: 108 MMOL/L — SIGNIFICANT CHANGE UP (ref 96–108)
CO2 SERPL-SCNC: 21 MMOL/L — LOW (ref 22–31)
CREAT SERPL-MCNC: 2.14 MG/DL — HIGH (ref 0.5–1.3)
CULTURE RESULTS: SIGNIFICANT CHANGE UP
GLUCOSE BLDC GLUCOMTR-MCNC: 127 MG/DL — HIGH (ref 70–99)
GLUCOSE BLDC GLUCOMTR-MCNC: 128 MG/DL — HIGH (ref 70–99)
GLUCOSE BLDC GLUCOMTR-MCNC: 138 MG/DL — HIGH (ref 70–99)
GLUCOSE BLDC GLUCOMTR-MCNC: 141 MG/DL — HIGH (ref 70–99)
GLUCOSE SERPL-MCNC: 129 MG/DL — HIGH (ref 70–99)
HCT VFR BLD CALC: 37.4 % — LOW (ref 39–50)
HGB BLD-MCNC: 12.2 G/DL — LOW (ref 13–17)
MCHC RBC-ENTMCNC: 29.9 PG — SIGNIFICANT CHANGE UP (ref 27–34)
MCHC RBC-ENTMCNC: 32.6 GM/DL — SIGNIFICANT CHANGE UP (ref 32–36)
MCV RBC AUTO: 91.7 FL — SIGNIFICANT CHANGE UP (ref 80–100)
METHOD TYPE: SIGNIFICANT CHANGE UP
NRBC # BLD: 0 /100 WBCS — SIGNIFICANT CHANGE UP (ref 0–0)
ORGANISM # SPEC MICROSCOPIC CNT: SIGNIFICANT CHANGE UP
ORGANISM # SPEC MICROSCOPIC CNT: SIGNIFICANT CHANGE UP
PLATELET # BLD AUTO: 296 K/UL — SIGNIFICANT CHANGE UP (ref 150–400)
POTASSIUM SERPL-MCNC: 4.4 MMOL/L — SIGNIFICANT CHANGE UP (ref 3.5–5.3)
POTASSIUM SERPL-SCNC: 4.4 MMOL/L — SIGNIFICANT CHANGE UP (ref 3.5–5.3)
RBC # BLD: 4.08 M/UL — LOW (ref 4.2–5.8)
RBC # FLD: 14.4 % — SIGNIFICANT CHANGE UP (ref 10.3–14.5)
SARS-COV-2 RNA SPEC QL NAA+PROBE: SIGNIFICANT CHANGE UP
SODIUM SERPL-SCNC: 141 MMOL/L — SIGNIFICANT CHANGE UP (ref 135–145)
SPECIMEN SOURCE: SIGNIFICANT CHANGE UP
WBC # BLD: 11.13 K/UL — HIGH (ref 3.8–10.5)
WBC # FLD AUTO: 11.13 K/UL — HIGH (ref 3.8–10.5)

## 2021-12-22 RX ORDER — LACTOBACILLUS ACIDOPHILUS 100MM CELL
1 CAPSULE ORAL
Refills: 0 | Status: DISCONTINUED | OUTPATIENT
Start: 2021-12-22 | End: 2021-12-23

## 2021-12-22 RX ORDER — CIPROFLOXACIN LACTATE 400MG/40ML
250 VIAL (ML) INTRAVENOUS EVERY 12 HOURS
Refills: 0 | Status: DISCONTINUED | OUTPATIENT
Start: 2021-12-22 | End: 2021-12-22

## 2021-12-22 RX ORDER — APIXABAN 2.5 MG/1
1 TABLET, FILM COATED ORAL
Qty: 0 | Refills: 0 | DISCHARGE
Start: 2021-12-22

## 2021-12-22 RX ORDER — APIXABAN 2.5 MG/1
1 TABLET, FILM COATED ORAL
Qty: 60 | Refills: 0
Start: 2021-12-22 | End: 2022-01-20

## 2021-12-22 RX ORDER — DIGOXIN 250 MCG
1 TABLET ORAL
Qty: 0 | Refills: 0 | DISCHARGE
Start: 2021-12-22

## 2021-12-22 RX ORDER — LACTOBACILLUS ACIDOPHILUS 100MM CELL
1 CAPSULE ORAL
Qty: 0 | Refills: 0 | DISCHARGE
Start: 2021-12-22

## 2021-12-22 RX ORDER — DILTIAZEM HCL 120 MG
120 CAPSULE, EXT RELEASE 24 HR ORAL DAILY
Refills: 0 | Status: DISCONTINUED | OUTPATIENT
Start: 2021-12-22 | End: 2021-12-23

## 2021-12-22 RX ORDER — DIGOXIN 250 MCG
1 TABLET ORAL
Qty: 30 | Refills: 0
Start: 2021-12-22 | End: 2022-01-20

## 2021-12-22 RX ORDER — CIPROFLOXACIN LACTATE 400MG/40ML
1 VIAL (ML) INTRAVENOUS
Qty: 0 | Refills: 0 | DISCHARGE
Start: 2021-12-22

## 2021-12-22 RX ORDER — DILTIAZEM HCL 120 MG
1 CAPSULE, EXT RELEASE 24 HR ORAL
Qty: 0 | Refills: 0 | DISCHARGE
Start: 2021-12-22

## 2021-12-22 RX ORDER — METOPROLOL TARTRATE 50 MG
1 TABLET ORAL
Qty: 0 | Refills: 0 | DISCHARGE
Start: 2021-12-22

## 2021-12-22 RX ORDER — CIPROFLOXACIN LACTATE 400MG/40ML
1 VIAL (ML) INTRAVENOUS
Qty: 4 | Refills: 0
Start: 2021-12-22 | End: 2021-12-23

## 2021-12-22 RX ORDER — CLOPIDOGREL BISULFATE 75 MG/1
37.5 TABLET, FILM COATED ORAL
Qty: 0 | Refills: 0 | DISCHARGE

## 2021-12-22 RX ORDER — METOPROLOL TARTRATE 50 MG
100 TABLET ORAL
Refills: 0 | Status: DISCONTINUED | OUTPATIENT
Start: 2021-12-22 | End: 2021-12-23

## 2021-12-22 RX ORDER — METOPROLOL TARTRATE 50 MG
1 TABLET ORAL
Qty: 60 | Refills: 0
Start: 2021-12-22 | End: 2022-01-20

## 2021-12-22 RX ORDER — PANTOPRAZOLE SODIUM 20 MG/1
1 TABLET, DELAYED RELEASE ORAL
Qty: 0 | Refills: 0 | DISCHARGE
Start: 2021-12-22

## 2021-12-22 RX ADMIN — PANTOPRAZOLE SODIUM 40 MILLIGRAM(S): 20 TABLET, DELAYED RELEASE ORAL at 06:18

## 2021-12-22 RX ADMIN — APIXABAN 2.5 MILLIGRAM(S): 2.5 TABLET, FILM COATED ORAL at 06:18

## 2021-12-22 RX ADMIN — ATORVASTATIN CALCIUM 20 MILLIGRAM(S): 80 TABLET, FILM COATED ORAL at 22:13

## 2021-12-22 RX ADMIN — Medication 10 MG/HR: at 02:16

## 2021-12-22 RX ADMIN — Medication 250 MILLIGRAM(S): at 18:19

## 2021-12-22 RX ADMIN — Medication 60 MILLIGRAM(S): at 02:04

## 2021-12-22 RX ADMIN — Medication 100 MILLIGRAM(S): at 18:19

## 2021-12-22 RX ADMIN — Medication 125 MICROGRAM(S): at 06:18

## 2021-12-22 RX ADMIN — INSULIN GLARGINE 10 UNIT(S): 100 INJECTION, SOLUTION SUBCUTANEOUS at 08:18

## 2021-12-22 RX ADMIN — APIXABAN 2.5 MILLIGRAM(S): 2.5 TABLET, FILM COATED ORAL at 18:20

## 2021-12-22 RX ADMIN — GABAPENTIN 100 MILLIGRAM(S): 400 CAPSULE ORAL at 11:34

## 2021-12-22 RX ADMIN — Medication 120 MILLIGRAM(S): at 08:18

## 2021-12-22 RX ADMIN — Medication 50 MILLIGRAM(S): at 06:18

## 2021-12-22 NOTE — DISCHARGE NOTE NURSING/CASE MANAGEMENT/SOCIAL WORK - NSDCVIVACCINE_GEN_ALL_CORE_FT
Tdap; 27-Nov-2020 07:55; Bette Pizano (ALLIE); [employer]; [id number] (Exp. Date: 31-Jul-2022); IntraMuscular; Deltoid Left.; 0.5 milliLiter(s); VIS (VIS Published: 09-May-2013, VIS Presented: 27-Nov-2020);

## 2021-12-22 NOTE — PROGRESS NOTE ADULT - PROBLEM SELECTOR PLAN 5
stable  continue lantus and riss
controlled, hold Tradjenta & Januvia, started him on Lantus [id number] units subcutaneously daily in addition to insulin Lispro on a sliding scale before meals & at bedtime, adjust the Lantus dose based on total daily insulin requirements, continue low dose Gabapentin for neuropathy. Patient not on any ACEIs or ARBs, will not add any given his carotid stenosis & advanced kidney dysfunction
stable  continue lantus and riss

## 2021-12-22 NOTE — DISCHARGE NOTE NURSING/CASE MANAGEMENT/SOCIAL WORK - NSSCNAMETXT_GEN_ALL_CORE
[facility] - [phone] or [phone]  Nurse to visit the day after hospital discharge; physical therapist to follow. Please contact the home care agency if you have not heard from them by 12 noon on the day after your hospital discharge.

## 2021-12-22 NOTE — DISCHARGE NOTE NURSING/CASE MANAGEMENT/SOCIAL WORK - NSDCPEFALRISK_GEN_ALL_CORE
For information on Fall & Injury Prevention, visit: [email or web address] OR  [email or web address] OR  https://www.cdc.gov/steadi/patient.html

## 2021-12-22 NOTE — PROGRESS NOTE ADULT - PROBLEM SELECTOR PLAN 2
on eliquis now- renally adjusted  PT eval for oob
on eliquis now  PT eval for oob
continue full dose lovenox  change to po AC on dc  ECHO noted  PT eval

## 2021-12-22 NOTE — DISCHARGE NOTE NURSING/CASE MANAGEMENT/SOCIAL WORK - NSSCCARECORD_GEN_ALL_CORE
[facility]

## 2021-12-22 NOTE — PROGRESS NOTE ADULT - PROBLEM SELECTOR PLAN 6
controlled, will hold Amlodipine for as he is now on Diltiazem, monitor.

## 2021-12-22 NOTE — DISCHARGE NOTE NURSING/CASE MANAGEMENT/SOCIAL WORK - NSDCCRTYPESERV_GEN_ALL_CORE_FT
prior to admission you have private-hire aide services 5 days (Mon-to-Fri) only. 9am-5Pm Mon and Wed; 9am-3pm on Tues/Thurs/Fri.

## 2021-12-22 NOTE — DISCHARGE NOTE PROVIDER - CARE PROVIDER_API CALL
Frank Richard)  Family Medicine  [address], [place] [id number]  Phone: [phone]  Fax: [phone]  Follow Up Time: 2 weeks    Beny Iglesias)  Cardiovascular Disease; Internal Medicine  [address], [address]  [place] [address]  Phone: [phone]  Fax: [phone]  Follow Up Time: 2 weeks

## 2021-12-22 NOTE — PROGRESS NOTE ADULT - PROBLEM SELECTOR PLAN 3
without relative neutrophilia, ML 2ry to the above, no evidence suggestive of an infection, trend off antibiotics.
without relative neutrophilia, ML 2ry to the above, no evidence suggestive of an infection, trend off antibiotics.
urine cultures noted  pt assymptomatic  start emperic cipro- renally adjusted

## 2021-12-22 NOTE — PROGRESS NOTE ADULT - PROBLEM SELECTOR PLAN 8
Denies awareness of any symptoms suggestive of GIAN, currently monitored on tele pulse ox.

## 2021-12-22 NOTE — PROGRESS NOTE ADULT - PROBLEM SELECTOR PLAN 9
on eliquis
on eliquis
already fully anticoagulated with LMWH, transition to a DOAC on dc, no need for further VTE prophylaxis, started on PO Protonix 40 mg daily for GI prophylaxis.

## 2021-12-22 NOTE — PROGRESS NOTE ADULT - PROBLEM SELECTOR PLAN 1
stable  hr well controlled  continue cardizem  outpt cardio fu
Cardio eval noted  ECHO noted  continue dilitiazem drip  and oral cardizem for now and diogoxin  ON metoprolol  today started on eliquis - renally adjusted given ckd  monitor on tele
Cardio eval noted  ECHO noted  continue dilitiazem drip and diogoxin  ON metoprolol  full dose lovenox, transition to oral on dc  monitor on tele

## 2021-12-22 NOTE — DISCHARGE NOTE NURSING/CASE MANAGEMENT/SOCIAL WORK - PATIENT PORTAL LINK FT
You can access the FollowMyHealth Patient Portal offered by [facility] by registering at the following website: [email or web address]. By joining [facility] FollowMyHealth portal, you will also be able to view your health information using other applications (apps) compatible with our system.

## 2021-12-22 NOTE — DISCHARGE NOTE PROVIDER - HOSPITAL COURSE
Afib with flutter  DVT  HTN  CAD  DM  CKD stage 3    stable for dc with outpt fu    >35 minutes spent on discharge Afib with flutter  DVT  UTI  HTN  CAD  DM  CKD stage 3    stable for dc with outpt fu    >35 minutes spent on discharge

## 2021-12-22 NOTE — DISCHARGE NOTE PROVIDER - PROVIDER TOKENS
PROVIDER:[TOKEN:[[id number]],FOLLOWUP:[2 weeks]],PROVIDER:[TOKEN:[[id number]],FOLLOWUP:[2 weeks]]

## 2021-12-22 NOTE — PROGRESS NOTE ADULT - PROBLEM SELECTOR PROBLEM 4
Acute diastolic congestive heart failure

## 2021-12-22 NOTE — DISCHARGE NOTE PROVIDER - NSDCCPCAREPLAN_GEN_ALL_CORE_FT
PRINCIPAL DISCHARGE DIAGNOSIS  Diagnosis: Atrial fibrillation with slow ventricular response  Assessment and Plan of Treatment: continue current cardiac medications as prescribed  fu with cardilogy in 2 weeks      SECONDARY DISCHARGE DIAGNOSES  Diagnosis: DVT of lower limb, acute  Assessment and Plan of Treatment: continue eliquis at renal dosing  fu with pcp

## 2021-12-22 NOTE — PROGRESS NOTE ADULT - PROBLEM SELECTOR PLAN 7
continue Atorvastatin 20 at bedtime.

## 2021-12-22 NOTE — PROGRESS NOTE ADULT - PROBLEM SELECTOR PLAN 4
Clinically improved today  Rpt echo noted  Cardio eval noted  Lasix only per cardiology - currently stable  continue to monitor  Rate control HR
Clinically improved today  Rpt echo noted  Cardio eval noted  Lasix only per cardiology - currently stable  continue to monitor  Rate control HR
Clinically improved today  Rpt echo noted  Cardio eval noted  continue to monitor

## 2021-12-22 NOTE — DISCHARGE NOTE PROVIDER - NSDCMRMEDTOKEN_GEN_ALL_CORE_FT
apixaban 2.5 mg oral tablet: 1 tab(s) orally every 12 hours  atorvastatin 20 mg oral tablet: 1 tab(s) orally once a day  digoxin 125 mcg (0.125 mg) oral tablet: 1 tab(s) orally once a day  dilTIAZem 120 mg/24 hours oral capsule, extended release: 1 cap(s) orally once a day  gabapentin 100 mg oral capsule: 1 cap(s) orally once a day  Januvia 50 mg oral tablet: 1 tab(s) orally once a day  metoprolol tartrate 100 mg oral tablet: 1 tab(s) orally 2 times a day  pantoprazole 40 mg oral delayed release tablet: 1 tab(s) orally once a day (before a meal)  trajenta: 5 milligram(s) orally once a day   apixaban 2.5 mg oral tablet: 1 tab(s) orally every 12 hours  atorvastatin 20 mg oral tablet: 1 tab(s) orally once a day  ciprofloxacin 250 mg oral tablet: 1 tab(s) orally every 12 hours  stop on 12/25/21  digoxin 125 mcg (0.125 mg) oral tablet: 1 tab(s) orally once a day  dilTIAZem 120 mg/24 hours oral capsule, extended release: 1 cap(s) orally once a day  gabapentin 100 mg oral capsule: 1 cap(s) orally once a day  Januvia 50 mg oral tablet: 1 tab(s) orally once a day  metoprolol tartrate 100 mg oral tablet: 1 tab(s) orally 2 times a day  pantoprazole 40 mg oral delayed release tablet: 1 tab(s) orally once a day (before a meal)  trajenta: 5 milligram(s) orally once a day   amoxicillin-clavulanate 500 mg-125 mg oral tablet: 1 tab(s) orally 2 times a day  apixaban 2.5 mg oral tablet: 1 tab(s) orally every 12 hours  atorvastatin 20 mg oral tablet: 1 tab(s) orally once a day  digoxin 125 mcg (0.125 mg) oral tablet: 1 tab(s) orally once a day  dilTIAZem 120 mg/24 hours oral capsule, extended release: 1 cap(s) orally once a day  gabapentin 100 mg oral capsule: 1 cap(s) orally once a day  Januvia 50 mg oral tablet: 1 tab(s) orally once a day  lactobacillus acidophilus oral capsule: 1 tab(s) orally 2 times a day  metoprolol tartrate 100 mg oral tablet: 1 tab(s) orally 2 times a day  pantoprazole 40 mg oral delayed release tablet: 1 tab(s) orally once a day (before a meal)  trajenta: 5 milligram(s) orally once a day

## 2021-12-23 VITALS
RESPIRATION RATE: 16 BRPM | OXYGEN SATURATION: 93 % | SYSTOLIC BLOOD PRESSURE: 145 MMHG | HEART RATE: 88 BPM | DIASTOLIC BLOOD PRESSURE: 89 MMHG | TEMPERATURE: 98 F

## 2021-12-23 LAB
GLUCOSE BLDC GLUCOMTR-MCNC: 115 MG/DL — HIGH (ref 70–99)
GLUCOSE BLDC GLUCOMTR-MCNC: 120 MG/DL — HIGH (ref 70–99)

## 2021-12-23 RX ADMIN — INSULIN GLARGINE 10 UNIT(S): 100 INJECTION, SOLUTION SUBCUTANEOUS at 08:25

## 2021-12-23 RX ADMIN — Medication 125 MICROGRAM(S): at 06:16

## 2021-12-23 RX ADMIN — Medication 100 MILLIGRAM(S): at 06:16

## 2021-12-23 RX ADMIN — Medication 1 TABLET(S): at 06:16

## 2021-12-23 RX ADMIN — PANTOPRAZOLE SODIUM 40 MILLIGRAM(S): 20 TABLET, DELAYED RELEASE ORAL at 06:16

## 2021-12-23 RX ADMIN — GABAPENTIN 100 MILLIGRAM(S): 400 CAPSULE ORAL at 12:39

## 2021-12-23 RX ADMIN — Medication 120 MILLIGRAM(S): at 06:16

## 2021-12-23 RX ADMIN — Medication 1 TABLET(S): at 06:15

## 2021-12-23 RX ADMIN — APIXABAN 2.5 MILLIGRAM(S): 2.5 TABLET, FILM COATED ORAL at 06:16

## 2021-12-23 NOTE — PROGRESS NOTE ADULT - ASSESSMENT
89 y/o M with PMH of HTN, Type 2 DM, Dyslipidemia, PAD, CKD stage 3, Melanoma in situ s/p excision, Diverticulitis, Retinal Detachment s/p repair, OA, and Obesity presented with generalized weakness, found to have acute DVT and in A Fib with RVR.
89 y/o M with PMH of HTN, Type 2 DM, Dyslipidemia, PAD, CKD stage 3, Melanoma in situ s/p excision, Diverticulitis, Retinal Detachment s/p repair, OA, and Obesity presented with generalized weakness, found to have acute DVT and in A Fib with RVR.    cardizem - digoxin  rate control  AF management  Cardio following  on AC   I and O  monitor for vol overload - CHF sx  on Lovenox AC  TTE - EF 35 pct -     DVT    - on eliquis    CKD   serial renal indices  I and O  monitor labs and lytes    CXR c/w Pulm edema  proBNP noted  Cardio following  judicious diuresis  TTE - EF 35    GOC discussion  o2 support - keep sat > 88 pct  monitor VS and HD and Sat  assist with needs - ADL  monitored unit care and management
The patient is a 89 year old male with a history of HTN, HL, DM, CKD, carotid stenosis who presents with vomiting, found to have rapid atrial fibrillation, acute DVT.    Plan:  - Continue diltiazem  mg daily - attempt to wean off as outpatient  - Continue metoprolol tartrate 100 mg bid  - Continue digoxin 0.125 mg PO daily  - Hold amlodipine  - LE venous duplex with left leg DVT  - Patient was transitioned to apixaban 2.5 mg bid - appropriate dosing for AF although underdosing for DVT. However, with worsening renal function, risks of higher dose of apixaban likely outweigh benefits so will leave as is.  - Discontinue clopidogrel  - Echo with moderately reduced LV systolic function - possibly tachycardia related but cannot exclude underlying CAD. Not a candidate for coronary angiography at the current time given comorbidities and renal function. Will treat heart failure medically.  - No ACEI/ARB for now due to HARESH/CKD  - PT  - Discharge planning  
The patient is a 89 year old male with a history of HTN, HL, DM, CKD, carotid stenosis who presents with vomiting, found to have rapid atrial fibrillation, acute DVT.    Plan:  - Continue diltiazem drip  - Add diltiazem 60 mg q6h  - Continue metoprolol tartrate 50 mg bid  - Continue digoxin 0.125 mg PO daily  - Wean off diltiazem drip if possible  - Hold amlodipine  - LE venous duplex with left leg DVT  - On full dose enoxaparin - eventual transition to DOAC  - Will discontinue clopidogrel for now while on anticoagulation  - Echo with moderately reduced LV systolic function - possibly tachycardia related but cannot exclude underlying CAD. Not a candidate for coronary angiography at the current time given comorbidities and renal function. Will treat heart failure medically.  - No ACEI/ARB for now due to HARESH/CKD    
89 y/o M with PMH of HTN, Type 2 DM, Dyslipidemia, PAD, CKD stage 3, Melanoma in situ s/p excision, Diverticulitis, Retinal Detachment s/p repair, OA, and Obesity presented with generalized weakness, found to have acute DVT and in A Fib with RVR.    cardizem -   rate control  AF management  Cardio following  on AC   I and O  monitor for vol overload - CHF sx  on Lovenox AC  TTE - EF 35 pct -     DVT   on lovenox  eventually DOAC - renal dosed    CKD   serial renal indices  I and O  monitor labs and lytes    CXR c/w Pulm edema  proBNP noted  Cardio following  judicious diuresis  TTE -     GOC discussion  o2 support - keep sat > 88 pct  monitor VS and HD and Sat  assist with needs - ADL  monitored unit care and management
The patient is a 89 year old male with a history of HTN, HL, DM, CKD, carotid stenosis who presents with vomiting, found to have rapid atrial fibrillation, acute DVT.    Plan:  - Discontinue diltiazem drip  - Change diltiazem to diltiazem  mg daily - will attempt to wean off if able given heart failure  - Increase metoprolol tartrate to 100 mg bid  - Continue digoxin 0.125 mg PO daily  - Hold amlodipine  - LE venous duplex with left leg DVT  - Patient was transitioned to apixaban 2.5 mg bid - appropriate dosing for AF although underdosing for DVT. However, with worsening renal function, risks of higher dose of apixaban likely outweigh benefits so will leave as is.  - Discontinue clopidogrel  - Echo with moderately reduced LV systolic function - possibly tachycardia related but cannot exclude underlying CAD. Not a candidate for coronary angiography at the current time given comorbidities and renal function. Will treat heart failure medically.  - No ACEI/ARB for now due to HARESH/CKD  - PT  
89 y/o M with PMH of HTN, Type 2 DM, Dyslipidemia, PAD, CKD stage 3, Melanoma in situ s/p excision, Diverticulitis, Retinal Detachment s/p repair, OA, and Obesity presented with generalized weakness, found to have acute DVT and in A Fib with RVR.    AF management  on PO ABX  CM notes reviewed - DC plan for Home with Aides  on o2 support - wean as tolerated - keep sat > 88 pct -   TTE - EF 35 pct -     DVT - on eliquis    CKD   serial renal indices  I and O  monitor labs and lytes    CXR c/w Pulm edema  proBNP noted  Cardio following  judicious diuresis  TTE - EF 35    GOC discussion  o2 support - keep sat > 88 pct  monitor VS and HD and Sat  assist with needs - ADL  monitored unit care and management
89 y/o M with PMH of HTN, Type 2 DM, Dyslipidemia, PAD, CKD stage 3, Melanoma in situ s/p excision, Diverticulitis, Retinal Detachment s/p repair, OA, and Obesity presented with generalized weakness, found to have acute DVT and in A Fib with RVR.
89 y/o M with PMH of HTN, Type 2 DM, Dyslipidemia, PAD, CKD stage 3, Melanoma in situ s/p excision, Diverticulitis, Retinal Detachment s/p repair, OA, and Obesity presented with generalized weakness, found to have acute DVT and in A Fib with RVR.

## 2021-12-23 NOTE — PROGRESS NOTE ADULT - SUBJECTIVE AND OBJECTIVE BOX
Chief Complaint: Vomiting    Interval Events: No events overnight.    Review of Systems:  General: No fevers, chills, weight gain  Skin: No rashes, color changes  Cardiovascular: No chest pain, orthopnea  Respiratory: No shortness of breath, cough  Gastrointestinal: No nausea, abdominal pain  Genitourinary: No incontinence, pain with urination  Musculoskeletal: No pain, swelling, decreased range of motion  Neurological: No headache, weakness  Psychiatric: No depression, anxiety  Endocrine: No weight gain, increased thirst  All other systems are comprehensively negative.    Physical Exam:  Vital Signs Last 24 Hrs  T(C): 36.4 (22 Dec 2021 05:24), Max: 37.1 (22 Dec 2021 00:00)  T(F): 97.5 (22 Dec 2021 05:24), Max: 98.7 (22 Dec 2021 00:00)  HR: 97 (22 Dec 2021 05:24) (90 - 116)  BP: 114/80 (22 Dec 2021 05:24) (108/70 - 132/84)  BP(mean): --  RR: 20 (22 Dec 2021 05:24) (17 - 20)  SpO2: 96% (22 Dec 2021 05:24) (88% - 96%)  General: NAD  HEENT: MMM  Neck: No JVD, no carotid bruit  Lungs: CTAB  CV: Irregular, nl S1/S2, no M/R/G  Abdomen: S/NT/ND, +BS  Extremities: No LE edema, no cyanosis  Neuro: AAOx3, non-focal  Skin: No rash    Labs:    12-22    141  |  108  |  61<H>  ----------------------------<  129<H>  4.4   |  21<L>  |  2.14<H>    Ca    7.9<L>      22 Dec 2021 07:30                          12.2   11.13 )-----------( 296      ( 22 Dec 2021 07:30 )             37.4     Telemetry: AF
Date/Time Patient Seen:  		  Referring MD:   Data Reviewed	       Patient is a 89y old  Male who presents with a chief complaint of Generalized weakness. (20 Dec 2021 10:06)      Subjective/HPI     PAST MEDICAL & SURGICAL HISTORY:  HLD (hyperlipidemia)    HTN (hypertension)    Carotid artery disorder  Left carotid congenital anomaly    Osteoarthritis    Melanoma in situ  left thigh    Type 2 diabetes mellitus without complication    History of basal cell carcinoma (BCC)    Basal cell cancer  Moh&#x27;s procedure 4/08/2014    S/P lumbar laminectomy    Retinal detachment  repair - right eye    Dupuytrens contracture  release - both hands          Medication list         MEDICATIONS  (STANDING):  atorvastatin 20 milliGRAM(s) Oral at bedtime  dextrose 40% Gel 15 Gram(s) Oral once  dextrose 5%. 1000 milliLiter(s) (50 mL/Hr) IV Continuous <Continuous>  dextrose 5%. 1000 milliLiter(s) (100 mL/Hr) IV Continuous <Continuous>  dextrose 50% Injectable 25 Gram(s) IV Push once  dextrose 50% Injectable 12.5 Gram(s) IV Push once  dextrose 50% Injectable 25 Gram(s) IV Push once  digoxin     Tablet 125 MICROGram(s) Oral daily  diltiazem Infusion 10 mG/Hr (10 mL/Hr) IV Continuous <Continuous>  enoxaparin Injectable 100 milliGRAM(s) SubCutaneous two times a day  gabapentin 100 milliGRAM(s) Oral daily  glucagon  Injectable 1 milliGRAM(s) IntraMuscular once  insulin glargine Injectable (LANTUS) 10 Unit(s) SubCutaneous every morning  insulin lispro (ADMELOG) corrective regimen sliding scale   SubCutaneous three times a day before meals  insulin lispro (ADMELOG) corrective regimen sliding scale   SubCutaneous at bedtime  metoprolol tartrate 50 milliGRAM(s) Oral two times a day  pantoprazole    Tablet 40 milliGRAM(s) Oral before breakfast    MEDICATIONS  (PRN):         Vitals log        ICU Vital Signs Last 24 Hrs  T(C): 36.9 (21 Dec 2021 06:10), Max: 36.9 (20 Dec 2021 20:13)  T(F): 98.5 (21 Dec 2021 06:10), Max: 98.5 (21 Dec 2021 06:10)  HR: 108 (21 Dec 2021 06:10) (98 - 110)  BP: 127/71 (21 Dec 2021 06:10) (100/59 - 141/84)  BP(mean): --  ABP: --  ABP(mean): --  RR: 18 (21 Dec 2021 06:10) (17 - 20)  SpO2: 96% (21 Dec 2021 06:10) (92% - 98%)           Input and Output:  I&O's Detail      Lab Data                        11.8   11.35 )-----------( 285      ( 20 Dec 2021 06:22 )             36.1     12-20    141  |  107  |  57<H>  ----------------------------<  152<H>  4.6   |  22  |  1.79<H>    Ca    8.0<L>      20 Dec 2021 06:22  Phos  4.7     12-20  Mg     2.3     12-20    TPro  6.2  /  Alb  3.0<L>  /  TBili  0.6  /  DBili  x   /  AST  34  /  ALT  86<H>  /  AlkPhos  115  12-20            Review of Systems	      Objective     Physical Examination    heart s1s2  lung dc BS  abd soft      Pertinent Lab findings & Imaging      Rosa Elena:  NO   Adequate UO     I&O's Detail           Discussed with:     Cultures:	        Radiology                            
Date/Time Patient Seen:  		  Referring MD:   Data Reviewed	       Patient is a 89y old  Male who presents with a chief complaint of Generalized weakness. (22 Dec 2021 11:54)      Subjective/HPI     PAST MEDICAL & SURGICAL HISTORY:  HLD (hyperlipidemia)    HTN (hypertension)    Carotid artery disorder  Left carotid congenital anomaly    Osteoarthritis    Melanoma in situ  left thigh    Type 2 diabetes mellitus without complication    History of basal cell carcinoma (BCC)    Basal cell cancer  Moh&#x27;s procedure 4/08/2014    S/P lumbar laminectomy    Retinal detachment  repair - right eye    Dupuytrens contracture  release - both hands          Medication list         MEDICATIONS  (STANDING):  amoxicillin  500 milliGRAM(s)/clavulanate 1 Tablet(s) Oral two times a day  apixaban 2.5 milliGRAM(s) Oral every 12 hours  atorvastatin 20 milliGRAM(s) Oral at bedtime  dextrose 40% Gel 15 Gram(s) Oral once  dextrose 5%. 1000 milliLiter(s) (50 mL/Hr) IV Continuous <Continuous>  dextrose 5%. 1000 milliLiter(s) (100 mL/Hr) IV Continuous <Continuous>  dextrose 50% Injectable 25 Gram(s) IV Push once  dextrose 50% Injectable 12.5 Gram(s) IV Push once  dextrose 50% Injectable 25 Gram(s) IV Push once  digoxin     Tablet 125 MICROGram(s) Oral daily  diltiazem    milliGRAM(s) Oral daily  gabapentin 100 milliGRAM(s) Oral daily  glucagon  Injectable 1 milliGRAM(s) IntraMuscular once  insulin glargine Injectable (LANTUS) 10 Unit(s) SubCutaneous every morning  insulin lispro (ADMELOG) corrective regimen sliding scale   SubCutaneous three times a day before meals  insulin lispro (ADMELOG) corrective regimen sliding scale   SubCutaneous at bedtime  lactobacillus acidophilus 1 Tablet(s) Oral two times a day  metoprolol tartrate 100 milliGRAM(s) Oral two times a day  pantoprazole    Tablet 40 milliGRAM(s) Oral before breakfast    MEDICATIONS  (PRN):         Vitals log        ICU Vital Signs Last 24 Hrs  T(C): 36.7 (23 Dec 2021 05:28), Max: 36.8 (22 Dec 2021 10:10)  T(F): 98 (23 Dec 2021 05:28), Max: 98.2 (22 Dec 2021 10:10)  HR: 100 (23 Dec 2021 05:28) (59 - 110)  BP: 147/65 (23 Dec 2021 05:28) (114/79 - 147/65)  BP(mean): --  ABP: --  ABP(mean): --  RR: 18 (23 Dec 2021 05:28) (17 - 19)  SpO2: 96% (23 Dec 2021 05:28) (94% - 97%)           Input and Output:  I&O's Detail    21 Dec 2021 07:01  -  22 Dec 2021 07:00  --------------------------------------------------------  IN:    Diltiazem: 110 mL    Oral Fluid: 420 mL  Total IN: 530 mL    OUT:    Voided (mL): 700 mL  Total OUT: 700 mL    Total NET: -170 mL      22 Dec 2021 07:01  -  23 Dec 2021 06:43  --------------------------------------------------------  IN:  Total IN: 0 mL    OUT:    Voided (mL): 600 mL  Total OUT: 600 mL    Total NET: -600 mL          Lab Data                        12.2   11.13 )-----------( 296      ( 22 Dec 2021 07:30 )             37.4     12-22    141  |  108  |  61<H>  ----------------------------<  129<H>  4.4   |  21<L>  |  2.14<H>    Ca    7.9<L>      22 Dec 2021 07:30              Review of Systems	      Objective     Physical Examination    heart s1s2  lung dec BS  abd soft  frail  weak  on o2 support      Pertinent Lab findings & Imaging      Rosa Elena:  NO   Adequate UO     I&O's Detail    21 Dec 2021 07:01  -  22 Dec 2021 07:00  --------------------------------------------------------  IN:    Diltiazem: 110 mL    Oral Fluid: 420 mL  Total IN: 530 mL    OUT:    Voided (mL): 700 mL  Total OUT: 700 mL    Total NET: -170 mL      22 Dec 2021 07:01  -  23 Dec 2021 06:43  --------------------------------------------------------  IN:  Total IN: 0 mL    OUT:    Voided (mL): 600 mL  Total OUT: 600 mL    Total NET: -600 mL               Discussed with:     Cultures:	        Radiology                            
Chief Complaint: Vomiting    Interval Events: No events overnight.    Review of Systems:  General: No fevers, chills, weight gain  Skin: No rashes, color changes  Cardiovascular: No chest pain, orthopnea  Respiratory: No shortness of breath, cough  Gastrointestinal: No nausea, abdominal pain  Genitourinary: No incontinence, pain with urination  Musculoskeletal: No pain, swelling, decreased range of motion  Neurological: No headache, weakness  Psychiatric: No depression, anxiety  Endocrine: No weight gain, increased thirst  All other systems are comprehensively negative.    Physical Exam:  Vital Signs Last 24 Hrs  T(C): 36.7 (23 Dec 2021 05:28), Max: 36.8 (22 Dec 2021 10:10)  T(F): 98 (23 Dec 2021 05:28), Max: 98.2 (22 Dec 2021 10:10)  HR: 100 (23 Dec 2021 05:28) (59 - 110)  BP: 147/65 (23 Dec 2021 05:28) (114/79 - 147/65)  BP(mean): --  RR: 18 (23 Dec 2021 05:28) (17 - 19)  SpO2: 96% (23 Dec 2021 05:28) (94% - 97%)  General: NAD  HEENT: MMM  Neck: No JVD, no carotid bruit  Lungs: CTAB  CV: Irregular, nl S1/S2, no M/R/G  Abdomen: S/NT/ND, +BS  Extremities: No LE edema, no cyanosis  Neuro: AAOx3, non-focal  Skin: No rash    Labs:    12-22    141  |  108  |  61<H>  ----------------------------<  129<H>  4.4   |  21<L>  |  2.14<H>    Ca    7.9<L>      22 Dec 2021 07:30                          12.2   11.13 )-----------( 296      ( 22 Dec 2021 07:30 )             37.4     Telemetry: AF
Chief Complaint: Vomiting    Interval Events: No events overnight.    Review of Systems:  General: No fevers, chills, weight gain  Skin: No rashes, color changes  Cardiovascular: No chest pain, orthopnea  Respiratory: No shortness of breath, cough  Gastrointestinal: No nausea, abdominal pain  Genitourinary: No incontinence, pain with urination  Musculoskeletal: No pain, swelling, decreased range of motion  Neurological: No headache, weakness  Psychiatric: No depression, anxiety  Endocrine: No weight gain, increased thirst  All other systems are comprehensively negative.    Physical Exam:  Vitals:        Vital Signs Last 24 Hrs  T(C): 36.3 (21 Dec 2021 07:34), Max: 36.9 (20 Dec 2021 20:13)  T(F): 97.4 (21 Dec 2021 07:34), Max: 98.5 (21 Dec 2021 06:10)  HR: 120 (21 Dec 2021 07:34) (98 - 120)  BP: 126/86 (21 Dec 2021 07:34) (100/59 - 141/84)  BP(mean): --  RR: 19 (21 Dec 2021 07:34) (17 - 20)  SpO2: 96% (21 Dec 2021 07:34) (92% - 98%)  General: NAD  HEENT: MMM  Neck: No JVD, no carotid bruit  Lungs: CTAB  CV: Irregular, nl S1/S2, no M/R/G  Abdomen: S/NT/ND, +BS  Extremities: No LE edema, no cyanosis  Neuro: AAOx3, non-focal  Skin: No rash    Labs:                        11.8   11.35 )-----------( 285      ( 20 Dec 2021 06:22 )             36.1     12-20    141  |  107  |  57<H>  ----------------------------<  152<H>  4.6   |  22  |  1.79<H>    Ca    8.0<L>      20 Dec 2021 06:22  Phos  4.7     12-20  Mg     2.3     12-20    TPro  6.2  /  Alb  3.0<L>  /  TBili  0.6  /  DBili  x   /  AST  34  /  ALT  86<H>  /  AlkPhos  115  12-20        PT/INR - ( 19 Dec 2021 16:14 )   PT: 14.3 sec;   INR: 1.19 ratio         PTT - ( 19 Dec 2021 16:14 )  PTT:31.6 sec    Telemetry: AF
Date/Time Patient Seen:  		  Referring MD:   Data Reviewed	       Patient is a 89y old  Male who presents with a chief complaint of Generalized weakness. (21 Dec 2021 16:05)      Subjective/HPI     PAST MEDICAL & SURGICAL HISTORY:  HLD (hyperlipidemia)    HTN (hypertension)    Carotid artery disorder  Left carotid congenital anomaly    Osteoarthritis    Melanoma in situ  left thigh    Type 2 diabetes mellitus without complication    History of basal cell carcinoma (BCC)    Basal cell cancer  Moh&#x27;s procedure 4/08/2014    S/P lumbar laminectomy    Retinal detachment  repair - right eye    Dupuytrens contracture  release - both hands          Medication list         MEDICATIONS  (STANDING):  apixaban 2.5 milliGRAM(s) Oral every 12 hours  atorvastatin 20 milliGRAM(s) Oral at bedtime  dextrose 40% Gel 15 Gram(s) Oral once  dextrose 5%. 1000 milliLiter(s) (50 mL/Hr) IV Continuous <Continuous>  dextrose 5%. 1000 milliLiter(s) (100 mL/Hr) IV Continuous <Continuous>  dextrose 50% Injectable 25 Gram(s) IV Push once  dextrose 50% Injectable 12.5 Gram(s) IV Push once  dextrose 50% Injectable 25 Gram(s) IV Push once  digoxin     Tablet 125 MICROGram(s) Oral daily  diltiazem    Tablet 60 milliGRAM(s) Oral every 6 hours  diltiazem Infusion 10 mG/Hr (10 mL/Hr) IV Continuous <Continuous>  gabapentin 100 milliGRAM(s) Oral daily  glucagon  Injectable 1 milliGRAM(s) IntraMuscular once  insulin glargine Injectable (LANTUS) 10 Unit(s) SubCutaneous every morning  insulin lispro (ADMELOG) corrective regimen sliding scale   SubCutaneous three times a day before meals  insulin lispro (ADMELOG) corrective regimen sliding scale   SubCutaneous at bedtime  metoprolol tartrate 50 milliGRAM(s) Oral two times a day  pantoprazole    Tablet 40 milliGRAM(s) Oral before breakfast    MEDICATIONS  (PRN):         Vitals log        ICU Vital Signs Last 24 Hrs  T(C): 36.4 (22 Dec 2021 05:24), Max: 37.1 (22 Dec 2021 00:00)  T(F): 97.5 (22 Dec 2021 05:24), Max: 98.7 (22 Dec 2021 00:00)  HR: 97 (22 Dec 2021 05:24) (90 - 120)  BP: 114/80 (22 Dec 2021 05:24) (108/70 - 132/84)  BP(mean): --  ABP: --  ABP(mean): --  RR: 20 (22 Dec 2021 05:24) (17 - 20)  SpO2: 96% (22 Dec 2021 05:24) (88% - 96%)           Input and Output:  I&O's Detail    21 Dec 2021 07:01  -  22 Dec 2021 06:41  --------------------------------------------------------  IN:    Diltiazem: 110 mL    Oral Fluid: 420 mL  Total IN: 530 mL    OUT:    Voided (mL): 700 mL  Total OUT: 700 mL    Total NET: -170 mL          Lab Data    12-21    140  |  107  |  63<H>  ----------------------------<  120<H>  4.6   |  22  |  2.01<H>    Ca    8.1<L>      21 Dec 2021 12:48              Review of Systems	      Objective     Physical Examination    heart s1s2  lung dec BS  abd soft  head nc  frail  weak      Pertinent Lab findings & Imaging      Rosa Elena:  NO   Adequate UO     I&O's Detail    21 Dec 2021 07:01  -  22 Dec 2021 06:41  --------------------------------------------------------  IN:    Diltiazem: 110 mL    Oral Fluid: 420 mL  Total IN: 530 mL    OUT:    Voided (mL): 700 mL  Total OUT: 700 mL    Total NET: -170 mL               Discussed with:     Cultures:	        Radiology                            
Patient is a 89y old  Male who presents with a chief complaint of Generalized weakness. (21 Dec 2021 07:02)      INTERVAL HPI/OVERNIGHT EVENTS: stable, chart noted    MEDICATIONS  (STANDING):  apixaban 2.5 milliGRAM(s) Oral every 12 hours  atorvastatin 20 milliGRAM(s) Oral at bedtime  dextrose 40% Gel 15 Gram(s) Oral once  dextrose 5%. 1000 milliLiter(s) (50 mL/Hr) IV Continuous <Continuous>  dextrose 5%. 1000 milliLiter(s) (100 mL/Hr) IV Continuous <Continuous>  dextrose 50% Injectable 25 Gram(s) IV Push once  dextrose 50% Injectable 12.5 Gram(s) IV Push once  dextrose 50% Injectable 25 Gram(s) IV Push once  digoxin     Tablet 125 MICROGram(s) Oral daily  diltiazem    Tablet 60 milliGRAM(s) Oral every 6 hours  diltiazem Infusion 10 mG/Hr (10 mL/Hr) IV Continuous <Continuous>  gabapentin 100 milliGRAM(s) Oral daily  glucagon  Injectable 1 milliGRAM(s) IntraMuscular once  insulin glargine Injectable (LANTUS) 10 Unit(s) SubCutaneous every morning  insulin lispro (ADMELOG) corrective regimen sliding scale   SubCutaneous three times a day before meals  insulin lispro (ADMELOG) corrective regimen sliding scale   SubCutaneous at bedtime  metoprolol tartrate 50 milliGRAM(s) Oral two times a day  pantoprazole    Tablet 40 milliGRAM(s) Oral before breakfast    MEDICATIONS  (PRN):      Allergies    aspirin (Anaphylaxis)  penicillin (Rash)    Intolerances        REVIEW OF SYSTEMS:  CONSTITUTIONAL: No fever, weight loss, or fatigue  EYES: No eye pain, visual disturbances  ENMT:  No difficulty hearing, tinnitus, vertigo; No sinus or throat pain  NECK: No pain or stiffness  RESPIRATORY: sob with cough  CARDIOVASCULAR: No chest pain, palpitations, dizziness  GASTROINTESTINAL: No abdominal or epigastric pain. No nausea, vomiting, or hematemesis; No diarrhea or constipation. No melena or hematochezia.  GENITOURINARY: No dysuria, frequency, hematuria, or incontinence  NEUROLOGICAL: No headaches, memory loss, loss of strength, numbness, or tremors  SKIN: No itching, burning  LYMPH NODES: No enlarged glands  MUSCULOSKELETAL: No joint pain or swelling; No muscle, back, or extremity pain  PSYCHIATRIC: No depression, mood swings  HEME/LYMPH: No easy bruising, or bleeding gums  ALLERGY AND IMMUNOLOGIC: No hives    Vital Signs Last 24 Hrs  T(C): 36.3 (21 Dec 2021 11:08), Max: 36.9 (20 Dec 2021 20:13)  T(F): 97.3 (21 Dec 2021 11:08), Max: 98.5 (21 Dec 2021 06:10)  HR: 105 (21 Dec 2021 11:21) (97 - 120)  BP: 116/64 (21 Dec 2021 11:21) (100/59 - 141/84)  BP(mean): --  RR: 19 (21 Dec 2021 10:28) (17 - 19)  SpO2: 88% (21 Dec 2021 11:21) (88% - 98%)    PHYSICAL EXAM:  GENERAL: NAD, well-groomed, well-developed  HEAD:  Atraumatic, Normocephalic  EYES: EOMI, PERRLA, conjunctiva and sclera clear  ENMT: No tonsillar erythema, exudates, or enlargement   NECK: Supple, No JVD  NERVOUS SYSTEM:  Alert & Oriented X3, Good concentration  CHEST/LUNG: fair b/l air entry  HEART: irr, irr  ABDOMEN: Soft, Nontender, Nondistended; Bowel sounds present  EXTREMITIES:  2+ Peripheral Pulses   LYMPH: No lymphadenopathy noted  SKIN: No rashes     LABS:      Ca    8.0        20 Dec 2021 06:22      PT/INR - ( 19 Dec 2021 16:14 )   PT: 14.3 sec;   INR: 1.19 ratio         PTT - ( 19 Dec 2021 16:14 )  PTT:31.6 sec  Urinalysis Basic - ( 19 Dec 2021 19:54 )    Color: Yellow / Appearance: Clear / S.020 / pH: x  Gluc: x / Ketone: Negative  / Bili: Negative / Urobili: Negative mg/dL   Blood: x / Protein: 30 mg/dL / Nitrite: Negative   Leuk Esterase: Small / RBC: x / WBC 0-2   Sq Epi: x / Non Sq Epi: Occasional / Bacteria: Few      CAPILLARY BLOOD GLUCOSE      POCT Blood Glucose.: 118 mg/dL (21 Dec 2021 11:15)  POCT Blood Glucose.: 87 mg/dL (21 Dec 2021 07:29)  POCT Blood Glucose.: 86 mg/dL (20 Dec 2021 22:08)  POCT Blood Glucose.: 169 mg/dL (20 Dec 2021 18:01)    blood culture --   No growth to date.    00:58      urine culture --   00:58  results   No growth to date.  00:58    wound with gram statin --    :58  organism  --   :58  specimen source .Blood Blood-Peripheral   00:58      RADIOLOGY & ADDITIONAL TESTS:      Consultant(s) Notes Reviewed:  [x ] YES  [ ] NO    Care Discussed with Consultants/Other Providers [ x] YES  [ ] NO    Advanced care planning discussed with patient and family, advanced care planning forms reviewed, discussed, and completed.  20 minutes spent.  
Patient is a 89y old  Male who presents with a chief complaint of Generalized weakness. (20 Dec 2021 06:59)      INTERVAL HPI/OVERNIGHT EVENTS: pt stable, feels well today, still with elevated hr    MEDICATIONS  (STANDING):  atorvastatin 20 milliGRAM(s) Oral at bedtime  dextrose 40% Gel 15 Gram(s) Oral once  dextrose 5%. 1000 milliLiter(s) (50 mL/Hr) IV Continuous <Continuous>  dextrose 5%. 1000 milliLiter(s) (100 mL/Hr) IV Continuous <Continuous>  dextrose 50% Injectable 25 Gram(s) IV Push once  dextrose 50% Injectable 12.5 Gram(s) IV Push once  dextrose 50% Injectable 25 Gram(s) IV Push once  digoxin  Injectable 250 MICROGram(s) IV Push every 6 hours  diltiazem Infusion 10 mG/Hr (10 mL/Hr) IV Continuous <Continuous>  enoxaparin Injectable 100 milliGRAM(s) SubCutaneous two times a day  gabapentin 100 milliGRAM(s) Oral daily  glucagon  Injectable 1 milliGRAM(s) IntraMuscular once  insulin glargine Injectable (LANTUS) 10 Unit(s) SubCutaneous every morning  insulin lispro (ADMELOG) corrective regimen sliding scale   SubCutaneous three times a day before meals  insulin lispro (ADMELOG) corrective regimen sliding scale   SubCutaneous at bedtime  metoprolol tartrate 50 milliGRAM(s) Oral two times a day  pantoprazole    Tablet 40 milliGRAM(s) Oral before breakfast    MEDICATIONS  (PRN):      Allergies    aspirin (Anaphylaxis)  penicillin (Rash)    Intolerances        REVIEW OF SYSTEMS:  CONSTITUTIONAL: No fever, weight loss, or fatigue  EYES: No eye pain, visual disturbances  ENMT:  No difficulty hearing, tinnitus, vertigo; No sinus or throat pain  NECK: No pain or stiffness  RESPIRATORY: No cough, wheezing, chills or hemoptysis; No shortness of breath  CARDIOVASCULAR: No chest pain, palpitations, dizziness  GASTROINTESTINAL: No abdominal or epigastric pain. No nausea, vomiting, or hematemesis; No diarrhea or constipation. No melena or hematochezia.  GENITOURINARY: No dysuria, frequency, hematuria, or incontinence  NEUROLOGICAL: No headaches, memory loss, loss of strength, numbness, or tremors  SKIN: No itching, burning  LYMPH NODES: No enlarged glands  MUSCULOSKELETAL: No joint pain or swelling; No muscle, back, or extremity pain  PSYCHIATRIC: No depression, mood swings  HEME/LYMPH: No easy bruising, or bleeding gums  ALLERGY AND IMMUNOLOGIC: No hives    Vital Signs Last 24 Hrs  T(C): 36.3 (20 Dec 2021 05:00), Max: 36.4 (19 Dec 2021 14:00)  T(F): 97.4 (20 Dec 2021 05:00), Max: 97.5 (19 Dec 2021 14:00)  HR: 100 (20 Dec 2021 09:00) (100 - 180)  BP: 128/73 (20 Dec 2021 09:00) (1/- - 156/87)  BP(mean): --  RR: 19 (20 Dec 2021 09:00) (16 - 30)  SpO2: 95% (20 Dec 2021 09:00) (92% - 98%)    PHYSICAL EXAM:  GENERAL: NAD, well-groomed, well-developed  HEAD:  Atraumatic, Normocephalic  EYES: EOMI, PERRLA, conjunctiva and sclera clear  ENMT: No tonsillar erythema, exudates, or enlargement   NECK: Supple, No JVD  NERVOUS SYSTEM:  Alert & Oriented  CHEST/LUNG: fair b/l air entry  HEART: irr,irr  ABDOMEN: Soft, Nontender, Nondistended; Bowel sounds present  EXTREMITIES:  2+ Peripheral Pulses, trace edema   LYMPH: No lymphadenopathy noted  SKIN: No rashes     LABS:                        11.8   11.35 )-----------( 285      ( 20 Dec 2021 06:22 )             36.1     20 Dec 2021 06:22    141    |  107    |  57     ----------------------------<  152    4.6     |  22     |  1.79     Ca    8.0        20 Dec 2021 06:22  Phos  4.7       20 Dec 2021 06:22  Mg     2.3       20 Dec 2021 06:22    TPro  6.2    /  Alb  3.0    /  TBili  0.6    /  DBili  x      /  AST  34     /  ALT  86     /  AlkPhos  115    20 Dec 2021 06:22    PT/INR - ( 19 Dec 2021 16:14 )   PT: 14.3 sec;   INR: 1.19 ratio         PTT - ( 19 Dec 2021 16:14 )  PTT:31.6 sec  Urinalysis Basic - ( 19 Dec 2021 19:54 )    Color: Yellow / Appearance: Clear / S.020 / pH: x  Gluc: x / Ketone: Negative  / Bili: Negative / Urobili: Negative mg/dL   Blood: x / Protein: 30 mg/dL / Nitrite: Negative   Leuk Esterase: Small / RBC: x / WBC 0-2   Sq Epi: x / Non Sq Epi: Occasional / Bacteria: Few      CAPILLARY BLOOD GLUCOSE      POCT Blood Glucose.: 128 mg/dL (19 Dec 2021 23:02)            RADIOLOGY & ADDITIONAL TESTS:      Consultant(s) Notes Reviewed:  [x ] YES  [ ] NO    Care Discussed with Consultants/Other Providers [x ] YES  [ ] NO    Advanced care planning discussed with patient and family, advanced care planning forms reviewed, discussed, and completed.  20 minutes spent.  
Patient is a 89y old  Male who presents with a chief complaint of Generalized weakness. (22 Dec 2021 10:13)      INTERVAL HPI/OVERNIGHT EVENTS: stable no new events, hr better controlled ,for dc to [facility]    MEDICATIONS  (STANDING):  apixaban 2.5 milliGRAM(s) Oral every 12 hours  atorvastatin 20 milliGRAM(s) Oral at bedtime  dextrose 40% Gel 15 Gram(s) Oral once  dextrose 5%. 1000 milliLiter(s) (50 mL/Hr) IV Continuous <Continuous>  dextrose 5%. 1000 milliLiter(s) (100 mL/Hr) IV Continuous <Continuous>  dextrose 50% Injectable 25 Gram(s) IV Push once  dextrose 50% Injectable 12.5 Gram(s) IV Push once  dextrose 50% Injectable 25 Gram(s) IV Push once  digoxin     Tablet 125 MICROGram(s) Oral daily  diltiazem    milliGRAM(s) Oral daily  gabapentin 100 milliGRAM(s) Oral daily  glucagon  Injectable 1 milliGRAM(s) IntraMuscular once  insulin glargine Injectable (LANTUS) 10 Unit(s) SubCutaneous every morning  insulin lispro (ADMELOG) corrective regimen sliding scale   SubCutaneous three times a day before meals  insulin lispro (ADMELOG) corrective regimen sliding scale   SubCutaneous at bedtime  metoprolol tartrate 100 milliGRAM(s) Oral two times a day  pantoprazole    Tablet 40 milliGRAM(s) Oral before breakfast    MEDICATIONS  (PRN):      Allergies    aspirin (Anaphylaxis)  penicillin (Rash)    Intolerances        REVIEW OF SYSTEMS:  CONSTITUTIONAL: No fever, weight loss, or fatigue  EYES: No eye pain, visual disturbances  ENMT:  No difficulty hearing, tinnitus, vertigo; No sinus or throat pain  NECK: No pain or stiffness  RESPIRATORY: No cough, wheezing, chills or hemoptysis; No shortness of breath  CARDIOVASCULAR: No chest pain, palpitations, dizziness  GASTROINTESTINAL: No abdominal or epigastric pain. No nausea, vomiting, or hematemesis; No diarrhea or constipation. No melena or hematochezia.  GENITOURINARY: No dysuria, frequency, hematuria, or incontinence  NEUROLOGICAL: No headaches, memory loss, loss of strength, numbness, or tremors  SKIN: No itching, burning  LYMPH NODES: No enlarged glands  MUSCULOSKELETAL: No joint pain or swelling; No muscle, back, or extremity pain  PSYCHIATRIC: No depression, mood swings  HEME/LYMPH: No easy bruising, or bleeding gums  ALLERGY AND IMMUNOLOGIC: No hives    Vital Signs Last 24 Hrs  T(C): 36.8 (22 Dec 2021 10:10), Max: 37.1 (22 Dec 2021 00:00)  T(F): 98.2 (22 Dec 2021 10:10), Max: 98.7 (22 Dec 2021 00:00)  HR: 77 (22 Dec 2021 10:10) (77 - 108)  BP: 123/79 (22 Dec 2021 10:10) (108/70 - 132/84)  BP(mean): --  RR: 19 (22 Dec 2021 10:10) (17 - 20)  SpO2: 95% (22 Dec 2021 10:10) (91% - 96%)    PHYSICAL EXAM:  GENERAL: NAD, well-groomed, well-developed  HEAD:  Atraumatic, Normocephalic  EYES: EOMI, PERRLA, conjunctiva and sclera clear  ENMT: No tonsillar erythema, exudates, or enlargement   NECK: Supple, No JVD  NERVOUS SYSTEM:  Alert & Oriented X3, Good concentration  CHEST/LUNG: Clear to auscultation bilaterally; No rales, rhonchi, wheezing  HEART: Regular rate and rhythm  ABDOMEN: Soft, Nontender, Nondistended; Bowel sounds present  EXTREMITIES:  2+ Peripheral Pulses   LYMPH: No lymphadenopathy noted  SKIN: No rashes     LABS:                        12.2   11.13 )-----------( 296      ( 22 Dec 2021 07:30 )             37.4     22 Dec 2021 07:30    141    |  108    |  61     ----------------------------<  129    4.4     |  21     |  2.14     Ca    7.9        22 Dec 2021 07:30          CAPILLARY BLOOD GLUCOSE      POCT Blood Glucose.: 138 mg/dL (22 Dec 2021 08:00)  POCT Blood Glucose.: 171 mg/dL (21 Dec 2021 21:11)    blood culture --   No growth to date.   12-20 @ 00:58    blood culture --   50,000 - 99,000 CFU/mL Gram positive organisms   12-20 @ 00:57      urine culture --  12-20 @ 00:58  results   No growth to date. 12-20 @ 00:58  urine culture --  12-20 @ 00:57  results   50,000 - 99,000 CFU/mL Gram positive organisms 12-20 @ 00:57    wound with gram statin --    12-20 @ 00:58  organism  --   12-20 @ 00:58  specimen source .Blood Blood-Peripheral  12-20 @ 00:58  wound with gram statin --    12-20 @ 00:57  organism  --   12-20 @ 00:57  specimen source Clean Catch Clean Catch (Midstream)  12-20 @ 00:57      RADIOLOGY & ADDITIONAL TESTS:      Consultant(s) Notes Reviewed:  [ x] YES  [ ] NO    Care Discussed with Consultants/Other Providers [x ] YES  [ ] NO    Advanced care planning discussed with patient and family, advanced care planning forms reviewed, discussed, and completed.  20 minutes spent.

## 2021-12-23 NOTE — PROGRESS NOTE ADULT - PROVIDER SPECIALTY LIST ADULT
Cardiology
Pulmonology
Internal Medicine

## 2021-12-23 NOTE — PROGRESS NOTE ADULT - REASON FOR ADMISSION
Generalized weakness.
weight gain

## 2021-12-25 LAB
CULTURE RESULTS: SIGNIFICANT CHANGE UP
CULTURE RESULTS: SIGNIFICANT CHANGE UP
SPECIMEN SOURCE: SIGNIFICANT CHANGE UP
SPECIMEN SOURCE: SIGNIFICANT CHANGE UP

## 2021-12-30 PROCEDURE — 83880 ASSAY OF NATRIURETIC PEPTIDE: CPT

## 2021-12-30 PROCEDURE — 85610 PROTHROMBIN TIME: CPT

## 2021-12-30 PROCEDURE — 96374 THER/PROPH/DIAG INJ IV PUSH: CPT

## 2021-12-30 PROCEDURE — 81001 URINALYSIS AUTO W/SCOPE: CPT

## 2021-12-30 PROCEDURE — 85025 COMPLETE CBC W/AUTO DIFF WBC: CPT

## 2021-12-30 PROCEDURE — 85730 THROMBOPLASTIN TIME PARTIAL: CPT

## 2021-12-30 PROCEDURE — 93970 EXTREMITY STUDY: CPT

## 2021-12-30 PROCEDURE — 93005 ELECTROCARDIOGRAM TRACING: CPT

## 2021-12-30 PROCEDURE — 82272 OCCULT BLD FECES 1-3 TESTS: CPT

## 2021-12-30 PROCEDURE — 84484 ASSAY OF TROPONIN QUANT: CPT

## 2021-12-30 PROCEDURE — 84145 PROCALCITONIN (PCT): CPT

## 2021-12-30 PROCEDURE — 94640 AIRWAY INHALATION TREATMENT: CPT

## 2021-12-30 PROCEDURE — 83690 ASSAY OF LIPASE: CPT

## 2021-12-30 PROCEDURE — 0225U NFCT DS DNA&RNA 21 SARSCOV2: CPT

## 2021-12-30 PROCEDURE — 97161 PT EVAL LOW COMPLEX 20 MIN: CPT

## 2021-12-30 PROCEDURE — 99285 EMERGENCY DEPT VISIT HI MDM: CPT

## 2021-12-30 PROCEDURE — 71045 X-RAY EXAM CHEST 1 VIEW: CPT

## 2021-12-30 PROCEDURE — 80053 COMPREHEN METABOLIC PANEL: CPT

## 2021-12-30 PROCEDURE — 83735 ASSAY OF MAGNESIUM: CPT

## 2021-12-30 PROCEDURE — 84100 ASSAY OF PHOSPHORUS: CPT

## 2021-12-30 PROCEDURE — 83036 HEMOGLOBIN GLYCOSYLATED A1C: CPT

## 2021-12-30 PROCEDURE — 80048 BASIC METABOLIC PNL TOTAL CA: CPT

## 2021-12-30 PROCEDURE — 97116 GAIT TRAINING THERAPY: CPT

## 2021-12-30 PROCEDURE — 82962 GLUCOSE BLOOD TEST: CPT

## 2021-12-30 PROCEDURE — 93306 TTE W/DOPPLER COMPLETE: CPT

## 2021-12-30 PROCEDURE — 87040 BLOOD CULTURE FOR BACTERIA: CPT

## 2021-12-30 PROCEDURE — 87186 SC STD MICRODIL/AGAR DIL: CPT

## 2021-12-30 PROCEDURE — 83605 ASSAY OF LACTIC ACID: CPT

## 2021-12-30 PROCEDURE — 36415 COLL VENOUS BLD VENIPUNCTURE: CPT

## 2021-12-30 PROCEDURE — U0005: CPT

## 2021-12-30 PROCEDURE — 87086 URINE CULTURE/COLONY COUNT: CPT

## 2021-12-30 PROCEDURE — 85027 COMPLETE CBC AUTOMATED: CPT

## 2021-12-30 PROCEDURE — U0003: CPT

## 2021-12-30 PROCEDURE — 97530 THERAPEUTIC ACTIVITIES: CPT

## 2021-12-30 PROCEDURE — 96376 TX/PRO/DX INJ SAME DRUG ADON: CPT

## 2021-12-30 PROCEDURE — 84443 ASSAY THYROID STIM HORMONE: CPT

## 2022-01-11 ENCOUNTER — INPATIENT (INPATIENT)
Facility: HOSPITAL | Age: 87
LOS: 1 days | Discharge: HOME | DRG: 291 | End: 2022-01-13
Attending: INTERNAL MEDICINE | Admitting: INTERNAL MEDICINE
Payer: MEDICARE

## 2022-01-11 VITALS
DIASTOLIC BLOOD PRESSURE: 80 MMHG | SYSTOLIC BLOOD PRESSURE: 116 MMHG | OXYGEN SATURATION: 94 % | HEIGHT: 68 IN | TEMPERATURE: 98 F | RESPIRATION RATE: 20 BRPM | HEART RATE: 122 BPM | WEIGHT: 223.11 LBS

## 2022-01-11 DIAGNOSIS — C44.91 BASAL CELL CARCINOMA OF SKIN, UNSPECIFIED: Chronic | ICD-10-CM

## 2022-01-11 DIAGNOSIS — N18.30 CHRONIC KIDNEY DISEASE, STAGE 3 UNSPECIFIED: ICD-10-CM

## 2022-01-11 DIAGNOSIS — H33.20 SEROUS RETINAL DETACHMENT, UNSPECIFIED EYE: Chronic | ICD-10-CM

## 2022-01-11 DIAGNOSIS — I50.23 ACUTE ON CHRONIC SYSTOLIC (CONGESTIVE) HEART FAILURE: ICD-10-CM

## 2022-01-11 DIAGNOSIS — I48.20 CHRONIC ATRIAL FIBRILLATION, UNSPECIFIED: ICD-10-CM

## 2022-01-11 DIAGNOSIS — I48.91 UNSPECIFIED ATRIAL FIBRILLATION: ICD-10-CM

## 2022-01-11 DIAGNOSIS — E11.9 TYPE 2 DIABETES MELLITUS WITHOUT COMPLICATIONS: ICD-10-CM

## 2022-01-11 DIAGNOSIS — M72.0 PALMAR FASCIAL FIBROMATOSIS [DUPUYTREN]: Chronic | ICD-10-CM

## 2022-01-11 DIAGNOSIS — Z98.89 OTHER SPECIFIED POSTPROCEDURAL STATES: Chronic | ICD-10-CM

## 2022-01-11 DIAGNOSIS — I50.9 HEART FAILURE, UNSPECIFIED: ICD-10-CM

## 2022-01-11 PROBLEM — Z85.828 PERSONAL HISTORY OF OTHER MALIGNANT NEOPLASM OF SKIN: Chronic | Status: ACTIVE | Noted: 2021-12-19

## 2022-01-11 LAB
ALBUMIN SERPL ELPH-MCNC: 3.1 G/DL — LOW (ref 3.3–5)
ALP SERPL-CCNC: 127 U/L — HIGH (ref 30–120)
ALT FLD-CCNC: 31 U/L DA — SIGNIFICANT CHANGE UP (ref 10–60)
ANION GAP SERPL CALC-SCNC: 11 MMOL/L — SIGNIFICANT CHANGE UP (ref 5–17)
APTT BLD: 34.7 SEC — SIGNIFICANT CHANGE UP (ref 27.5–35.5)
AST SERPL-CCNC: 21 U/L — SIGNIFICANT CHANGE UP (ref 10–40)
BASOPHILS # BLD AUTO: 0.04 K/UL — SIGNIFICANT CHANGE UP (ref 0–0.2)
BASOPHILS NFR BLD AUTO: 0.4 % — SIGNIFICANT CHANGE UP (ref 0–2)
BILIRUB SERPL-MCNC: 0.6 MG/DL — SIGNIFICANT CHANGE UP (ref 0.2–1.2)
BUN SERPL-MCNC: 59 MG/DL — HIGH (ref 7–23)
CALCIUM SERPL-MCNC: 8 MG/DL — LOW (ref 8.4–10.5)
CHLORIDE SERPL-SCNC: 107 MMOL/L — SIGNIFICANT CHANGE UP (ref 96–108)
CO2 SERPL-SCNC: 22 MMOL/L — SIGNIFICANT CHANGE UP (ref 22–31)
CREAT SERPL-MCNC: 2.3 MG/DL — HIGH (ref 0.5–1.3)
EOSINOPHIL # BLD AUTO: 0.19 K/UL — SIGNIFICANT CHANGE UP (ref 0–0.5)
EOSINOPHIL NFR BLD AUTO: 2.1 % — SIGNIFICANT CHANGE UP (ref 0–6)
GLUCOSE BLDC GLUCOMTR-MCNC: 117 MG/DL — HIGH (ref 70–99)
GLUCOSE BLDC GLUCOMTR-MCNC: 129 MG/DL — HIGH (ref 70–99)
GLUCOSE SERPL-MCNC: 142 MG/DL — HIGH (ref 70–99)
HCT VFR BLD CALC: 37.3 % — LOW (ref 39–50)
HGB BLD-MCNC: 12.3 G/DL — LOW (ref 13–17)
IMM GRANULOCYTES NFR BLD AUTO: 0.4 % — SIGNIFICANT CHANGE UP (ref 0–1.5)
INR BLD: 1.77 RATIO — HIGH (ref 0.88–1.16)
LYMPHOCYTES # BLD AUTO: 2.07 K/UL — SIGNIFICANT CHANGE UP (ref 1–3.3)
LYMPHOCYTES # BLD AUTO: 23 % — SIGNIFICANT CHANGE UP (ref 13–44)
MCHC RBC-ENTMCNC: 30.4 PG — SIGNIFICANT CHANGE UP (ref 27–34)
MCHC RBC-ENTMCNC: 33 GM/DL — SIGNIFICANT CHANGE UP (ref 32–36)
MCV RBC AUTO: 92.3 FL — SIGNIFICANT CHANGE UP (ref 80–100)
MONOCYTES # BLD AUTO: 0.72 K/UL — SIGNIFICANT CHANGE UP (ref 0–0.9)
MONOCYTES NFR BLD AUTO: 8 % — SIGNIFICANT CHANGE UP (ref 2–14)
NEUTROPHILS # BLD AUTO: 5.93 K/UL — SIGNIFICANT CHANGE UP (ref 1.8–7.4)
NEUTROPHILS NFR BLD AUTO: 66.1 % — SIGNIFICANT CHANGE UP (ref 43–77)
NRBC # BLD: 0 /100 WBCS — SIGNIFICANT CHANGE UP (ref 0–0)
NT-PROBNP SERPL-SCNC: HIGH PG/ML (ref 0–450)
PLATELET # BLD AUTO: 240 K/UL — SIGNIFICANT CHANGE UP (ref 150–400)
POTASSIUM SERPL-MCNC: 5.2 MMOL/L — SIGNIFICANT CHANGE UP (ref 3.5–5.3)
POTASSIUM SERPL-SCNC: 5.2 MMOL/L — SIGNIFICANT CHANGE UP (ref 3.5–5.3)
PROT SERPL-MCNC: 6.2 G/DL — SIGNIFICANT CHANGE UP (ref 6–8.3)
PROTHROM AB SERPL-ACNC: 20.8 SEC — HIGH (ref 10.6–13.6)
RBC # BLD: 4.04 M/UL — LOW (ref 4.2–5.8)
RBC # FLD: 15.5 % — HIGH (ref 10.3–14.5)
SARS-COV-2 RNA SPEC QL NAA+PROBE: SIGNIFICANT CHANGE UP
SODIUM SERPL-SCNC: 140 MMOL/L — SIGNIFICANT CHANGE UP (ref 135–145)
TROPONIN I, HIGH SENSITIVITY RESULT: 19.8 NG/L — SIGNIFICANT CHANGE UP
WBC # BLD: 8.99 K/UL — SIGNIFICANT CHANGE UP (ref 3.8–10.5)
WBC # FLD AUTO: 8.99 K/UL — SIGNIFICANT CHANGE UP (ref 3.8–10.5)

## 2022-01-11 PROCEDURE — 93010 ELECTROCARDIOGRAM REPORT: CPT

## 2022-01-11 PROCEDURE — 71045 X-RAY EXAM CHEST 1 VIEW: CPT | Mod: 26

## 2022-01-11 PROCEDURE — 99285 EMERGENCY DEPT VISIT HI MDM: CPT

## 2022-01-11 PROCEDURE — 93970 EXTREMITY STUDY: CPT | Mod: 26

## 2022-01-11 RX ORDER — DEXTROSE 50 % IN WATER 50 %
25 SYRINGE (ML) INTRAVENOUS ONCE
Refills: 0 | Status: DISCONTINUED | OUTPATIENT
Start: 2022-01-11 | End: 2022-01-13

## 2022-01-11 RX ORDER — DEXTROSE 50 % IN WATER 50 %
12.5 SYRINGE (ML) INTRAVENOUS ONCE
Refills: 0 | Status: DISCONTINUED | OUTPATIENT
Start: 2022-01-11 | End: 2022-01-13

## 2022-01-11 RX ORDER — DILTIAZEM HCL 120 MG
120 CAPSULE, EXT RELEASE 24 HR ORAL DAILY
Refills: 0 | Status: DISCONTINUED | OUTPATIENT
Start: 2022-01-12 | End: 2022-01-12

## 2022-01-11 RX ORDER — DILTIAZEM HCL 120 MG
10 CAPSULE, EXT RELEASE 24 HR ORAL ONCE
Refills: 0 | Status: COMPLETED | OUTPATIENT
Start: 2022-01-11 | End: 2022-01-11

## 2022-01-11 RX ORDER — GABAPENTIN 400 MG/1
100 CAPSULE ORAL DAILY
Refills: 0 | Status: DISCONTINUED | OUTPATIENT
Start: 2022-01-11 | End: 2022-01-13

## 2022-01-11 RX ORDER — ATORVASTATIN CALCIUM 80 MG/1
20 TABLET, FILM COATED ORAL AT BEDTIME
Refills: 0 | Status: DISCONTINUED | OUTPATIENT
Start: 2022-01-11 | End: 2022-01-13

## 2022-01-11 RX ORDER — INSULIN LISPRO 100/ML
VIAL (ML) SUBCUTANEOUS
Refills: 0 | Status: DISCONTINUED | OUTPATIENT
Start: 2022-01-11 | End: 2022-01-13

## 2022-01-11 RX ORDER — DIGOXIN 250 MCG
125 TABLET ORAL DAILY
Refills: 0 | Status: DISCONTINUED | OUTPATIENT
Start: 2022-01-12 | End: 2022-01-13

## 2022-01-11 RX ORDER — DEXTROSE 50 % IN WATER 50 %
15 SYRINGE (ML) INTRAVENOUS ONCE
Refills: 0 | Status: DISCONTINUED | OUTPATIENT
Start: 2022-01-11 | End: 2022-01-13

## 2022-01-11 RX ORDER — LACTOBACILLUS ACIDOPHILUS 100MM CELL
1 CAPSULE ORAL
Refills: 0 | Status: DISCONTINUED | OUTPATIENT
Start: 2022-01-11 | End: 2022-01-13

## 2022-01-11 RX ORDER — FUROSEMIDE 40 MG
40 TABLET ORAL DAILY
Refills: 0 | Status: DISCONTINUED | OUTPATIENT
Start: 2022-01-11 | End: 2022-01-13

## 2022-01-11 RX ORDER — ONDANSETRON 8 MG/1
4 TABLET, FILM COATED ORAL EVERY 8 HOURS
Refills: 0 | Status: DISCONTINUED | OUTPATIENT
Start: 2022-01-11 | End: 2022-01-13

## 2022-01-11 RX ORDER — INSULIN LISPRO 100/ML
VIAL (ML) SUBCUTANEOUS AT BEDTIME
Refills: 0 | Status: DISCONTINUED | OUTPATIENT
Start: 2022-01-11 | End: 2022-01-13

## 2022-01-11 RX ORDER — GLUCAGON INJECTION, SOLUTION 0.5 MG/.1ML
1 INJECTION, SOLUTION SUBCUTANEOUS ONCE
Refills: 0 | Status: DISCONTINUED | OUTPATIENT
Start: 2022-01-11 | End: 2022-01-13

## 2022-01-11 RX ORDER — LANOLIN ALCOHOL/MO/W.PET/CERES
3 CREAM (GRAM) TOPICAL AT BEDTIME
Refills: 0 | Status: DISCONTINUED | OUTPATIENT
Start: 2022-01-11 | End: 2022-01-13

## 2022-01-11 RX ORDER — METOPROLOL TARTRATE 50 MG
100 TABLET ORAL
Refills: 0 | Status: DISCONTINUED | OUTPATIENT
Start: 2022-01-11 | End: 2022-01-13

## 2022-01-11 RX ORDER — APIXABAN 2.5 MG/1
2.5 TABLET, FILM COATED ORAL
Refills: 0 | Status: DISCONTINUED | OUTPATIENT
Start: 2022-01-11 | End: 2022-01-13

## 2022-01-11 RX ORDER — SODIUM CHLORIDE 9 MG/ML
1000 INJECTION, SOLUTION INTRAVENOUS
Refills: 0 | Status: DISCONTINUED | OUTPATIENT
Start: 2022-01-11 | End: 2022-01-13

## 2022-01-11 RX ORDER — ACETAMINOPHEN 500 MG
650 TABLET ORAL EVERY 6 HOURS
Refills: 0 | Status: DISCONTINUED | OUTPATIENT
Start: 2022-01-11 | End: 2022-01-13

## 2022-01-11 RX ORDER — DIGOXIN 250 MCG
250 TABLET ORAL ONCE
Refills: 0 | Status: COMPLETED | OUTPATIENT
Start: 2022-01-11 | End: 2022-01-11

## 2022-01-11 RX ORDER — METOPROLOL TARTRATE 50 MG
5 TABLET ORAL ONCE
Refills: 0 | Status: COMPLETED | OUTPATIENT
Start: 2022-01-11 | End: 2022-01-11

## 2022-01-11 RX ORDER — PANTOPRAZOLE SODIUM 20 MG/1
40 TABLET, DELAYED RELEASE ORAL
Refills: 0 | Status: DISCONTINUED | OUTPATIENT
Start: 2022-01-11 | End: 2022-01-13

## 2022-01-11 RX ADMIN — Medication 250 MICROGRAM(S): at 13:48

## 2022-01-11 RX ADMIN — Medication 5 MILLIGRAM(S): at 13:28

## 2022-01-11 RX ADMIN — APIXABAN 2.5 MILLIGRAM(S): 2.5 TABLET, FILM COATED ORAL at 17:06

## 2022-01-11 RX ADMIN — Medication 10 MILLIGRAM(S): at 20:32

## 2022-01-11 RX ADMIN — Medication 100 MILLIGRAM(S): at 17:06

## 2022-01-11 RX ADMIN — Medication 40 MILLIGRAM(S): at 13:48

## 2022-01-11 RX ADMIN — ATORVASTATIN CALCIUM 20 MILLIGRAM(S): 80 TABLET, FILM COATED ORAL at 23:21

## 2022-01-11 NOTE — ED ADULT NURSE NOTE - OBJECTIVE STATEMENT
pt comes to ED c/o dyspnea on exertion and weakness x past few days, + Afib on CM ( diagnosed last month), pts unsure if he is on blood thinners or not, was also told increased weight gain and sent in to ED to rule out acute CHF, + BL LE edema noted, Right worse than Left. pt denies CP, fevers, chills, palp, dizziness, n/v/d, or other complaints. + redness to sacrum

## 2022-01-11 NOTE — ED PROVIDER NOTE - RELIEVING FACTORS
Visit Information Date & Time Provider Department Dept. Phone Encounter #  
 10/6/2017  2:30 PM Katharine Villalobos MD [facility] [phone] [id number] Your Appointments 10/6/2017  2:30 PM  
PHYSICAL with Katharine Villalobos MD  
[facility]) Appt Note: wellness  14 yr  
 [address] [facility] [place] [id number] [phone] [address] Upcoming Health Maintenance Date Due  
 HPV AGE 9Y-34Y (1 of 2 - Female 2 Dose Series) 3/18/2014 INFLUENZA AGE 9 TO ADULT 8/1/2017 MCV through Age 25 (2 of 2) 3/18/2019 DTaP/Tdap/Td series (7 - Td) 7/31/2024 Allergies as of 10/6/2017  Review Complete On: 10/6/2017 By: Yogesh Melton LPN Severity Noted Reaction Type Reactions Shellfish Containing Products  02/03/2012    Nausea and Vomiting Current Immunizations  Reviewed on 7/31/2014 Name Date DTAP Vaccine 5/17/2007, 10/5/2004 DTAP/HEPB/IPV Vaccine 2003, 2003, 2003 HIB Vaccine 10/5/2004, 2003, 2003, 2003 HPV (9-valent) 10/6/2017 Hepatitis A Vaccine 6/16/2008, 5/17/2007 IPV 5/17/2007, 2/4/2004 Influenza Nasal Vaccine 12/2/2014 Influenza Vaccine (Quad) PF 10/6/2017, 1/10/2017, 9/29/2015 Influenza Vaccine Nasal 10/3/2011, 10/26/2010, 9/17/2009 Influenza Vaccine PF 9/5/2013 Influenza Vaccine Split 10/6/2005, 10/5/2004 Influenza Vaccine Whole 10/22/2008, 11/29/2006 MMR Vaccine 5/17/2007, 10/5/2004 Meningococcal (MCV4P) Vaccine 7/31/2014 Pneumococcal Vaccine (Pcv) 10/5/2004, 2003, 2003, 2003 Tdap 7/31/2014 Varicella Virus Vaccine Live 6/16/2008, 3/22/2004 Not reviewed this visit You Were Diagnosed With   
  
 Codes Comments Encounter for immunization    -  Primary ICD-10-CM: F78 ICD-9-CM: V03.89 Vitals BP Pulse Temp Resp Height(growth percentile) Weight(growth percentile) 118/88 (73 %/ 98 %)* (BP 1 Location: Left arm) 80 98.1 °F (36.7 °C) (Oral) 18 5' 5.59\" (1.666 m) (79 %, Z= 0.81) 228 lb 6.4 oz (103.6 kg) (>99 %, Z= 2.58) LMP SpO2 BMI OB Status Smoking Status 09/22/2017 100% 37.33 kg/m2 (>99 %, Z= 2.39) Having regular periods Never Smoker *BP percentiles are based on NHBPEP's 4th Report Growth percentiles are based on CDC 2-20 Years data. Vitals History BMI and BSA Data Body Mass Index Body Surface Area  
 37.33 kg/m 2 2.19 m 2 Preferred Pharmacy Pharmacy Name Phone [facility] [address] [phone] Your Updated Medication List  
  
   
This list is accurate as of: 10/6/17  2:29 PM.  Always use your most recent med list.  
  
  
  
  
 guaiFENesin-codeine 100-10 mg/5 mL solution Commonly known as:  ROBITUSSIN AC Take 5 mL by mouth three (3) times daily as needed for Cough. Max Daily Amount: 15 mL. ibuprofen 100 mg/5 mL suspension Commonly known as:  ADVIL;MOTRIN Take 20 mL by mouth every six (6) hours as needed for Fever. We Performed the Following AMB POC HEMOGLOBIN (HGB) [78464 CPT(R)] HUMAN PAPILLOMA VIRUS NONAVALENT HPV 3 DOSE IM (GARDASIL 9) [77017 CPT(R)] INFLUENZA VIRUS VAC QUAD,SPLIT,PRESV FREE SYRINGE IM [id number] CPT(R)] VT IM ADM THRU 18YR ANY RTE 1ST/ONLY COMPT VAC/TOX [id number] CPT(R)] Patient Instructions Well Care - Tips for Parents of Teens: Care Instructions Your Care Instructions The natural changes your teen goes through during adolescence can be hard for both you and your teen. Your love, understanding, and guidance can help your teen make good decisions. Follow-up care is a key part of your child's treatment and safety.  Be sure to make and go to all appointments, and call your doctor if your child is having problems. It's also a good idea to know your child's test results and keep a list of the medicines your child takes. How can you care for your child at home? Be involved and supportive · Try to accept the natural changes in your relationship. It is normal for teens to want more independence. · Recognize that your teen may not want to be a part of all family events. But it is good for your teen to stay involved in some family events. · Respect your teen's need for privacy. Talk with your teen if you have safety concerns. · Be flexible. Allow your teen to test, explore, and communicate within limits. But be sure to stay firm and consistent. · Set realistic family rules. If these rules are broken, set clear limits and consequences. When your teen seems ready, give him or her more responsibility. · Pay attention to your teen. When he or she wants to talk, try to stop what you are doing and really listen. This will help build his or her confidence. · Decide together which activities are okay for your teen to do on his or her own. These may include staying home alone or going out with friends who drive. · Spend personal, fun time with your teen. Try to keep a sense of humor. Praise positive behaviors. · If you have trouble getting along with your teen, talk with other parents, family members, or a counselor. Healthy habits · Encourage your teen to be active for at least 1 hour each day. Plan family activities. These may include trips to the park, walks, bike rides, swimming, and gardening. · Encourage good eating habits. Your teen needs healthy meals and snacks every day. Stock up on fruits and vegetables. Have nonfat and low-fat dairy foods available. · Limit TV or video to 1 or 2 hours a day. Check programs for violence, bad language, and sex. Immunizations The flu vaccine is recommended once a year for all people age 7 months and older. Talk to your doctor if your teen did not yet get the vaccines for human papillomavirus (HPV), meningococcal disease, and tetanus, diphtheria, and pertussis. What to expect at this age Most teens are learning to think in more complex ways. They start to think about the future results of their actions. It's normal for teens to focus a lot on how they look, talk, or view politics. This is a way for teens to help define who they are. Friendships are very important in the early teen years. When should you call for help? Watch closely for changes in your child's health, and be sure to contact your doctor if: 
· You need information about raising your teen. This may include questions about: 
¨ Your teen's diet and nutrition. ¨ Your teen's sexuality or about sexually transmitted infections (STIs). ¨ Helping your teen take charge of his or her own health and medical care. ¨ Vaccinations your teen might need. ¨ Alcohol, illegal drugs, or smoking. ¨ Your teen's mood. · You have other questions or concerns. Where can you learn more? Go to http://anca-joanna.info/. Enter D577 in the search box to learn more about \"Well Care - Tips for Parents of Teens: Care Instructions. \" Current as of: May 4, 2017 Content Version: 11.3 © 7850-1783 [employer]. Care instructions adapted under license by [employer] (which disclaims liability or warranty for this information). If you have questions about a medical condition or this instruction, always ask your healthcare professional. [place] [address] any warranty or liability for your use of this information. Introducing [facility]! Dear Parent or Guardian, Thank you for requesting a JumpIn account for your child.   With JumpIn, you can view your childs hospital or ER discharge instructions, current allergies, immunizations and much more. In order to access your childs information, we require a signed consent on file. Please see the [facility] department or call [phone] for instructions on completing a BOARDZ Proxy request.   
Additional Information If you have questions, please visit the Frequently Asked Questions section of the BOARDZ website at [email or web address] Remember, BOARDZ is NOT to be used for urgent needs. For medical emergencies, dial 911. Now available from your iPhone and Android! Please provide this summary of care documentation to your next provider. Your primary care clinician is listed as Moses Guthrie. If you have any questions after today's visit, please call [phone]. none

## 2022-01-11 NOTE — H&P ADULT - HISTORY OF PRESENT ILLNESS
89 y/o M with PMH of HTN, Type 2 DM, Dyslipidemia, PAD, CKD stage 3, Melanoma in situ s/p excision, Diverticulitis, Retinal Detachment s/p repair, OA, chronic systolic heart failure and Obesity s/p diagnosed with Afib, L deep femoral vein DVT on eliquis and mild CHF on 12/19/2021, admitted at [facility] and started on eliquis, digoxin presents with c/o generalized weakness, weight gain and shortness of breath the past few days. PCP, Dr. Richard had d/c'ed digoxin and started on lasix which he took for 2 days last week and then discontinued by cardiologist, Dr. Iglesias due to no issues with respiration and frequent urination. States that he has been taking lasix 20mg daily the past 2 days. States that he has been short of breath since lasix was stopped and was had 4lb weight gain and son stated pt was tachycardic in 130s. Pt admits to dyspnea with exertion. Denies chest pain, fever, cough, calf pain. Family spoke with pcp today who advised pt to come in to ED for eval.

## 2022-01-11 NOTE — ED PROVIDER NOTE - PROGRESS NOTE DETAILS
Rosalie LEMUS for attending Dr. 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 Pt was recently diagnosed with Afib and was on diuretics. Pt was also recently admitted here for generalized weakness from -2021. Pt has became more SOB with exertion and also with LE edema and was sent for hospital for evaluation. No cp/sob/palp. No HA/n/v/dizziness. No numb/ting. No recent covid exposures. Pt vaccinated for covid x3. No agg/allev factors. No other acute co or changes. Rosalie LEMUS for attending Dr. 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 Pt was recently diagnosed with Afib and was on diuretics. Pt was also recently admitted here for generalized weakness from -2021. Pt has became more SOB with exertion and also with LE edema and was sent for hospital for evaluation. No cp/palp. No HA/n/v/dizziness. No numb/ting. No recent covid exposures. Pt vaccinated for covid x3. No agg/allev factors. No other acute co or changes. Spoke with Dr. JOSUE Jimenez, discussed case and results, accepted admission. Pt seen by cardiologist, Dr. Beny Iglesias in ED, advised to admit pt for rate control and diuresis. Family and PCP, Dr. Richard informed and agreed with plan. Rosalie LEMUS for attending Dr. 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 Pt was recently diagnosed with Afib and was on diuretics. Pt was also recently admitted here for generalized weakness from -2021. Pt has became more SOB with exertion and also with LE edema and was sent for hospital for evaluation. No cp/palp. No HA/n/v/dizziness. No numb/ting. No recent covid exposures. Pt vaccinated for covid x3. No agg/allev factors. No other acute co or changes.  Exam: MM Moist. neck supple. dec bs bl bases, no acc muscle use. rapid HR, abd soft NT. 2+ bl le edema. nl non-focal neuro exam. no other acute findings  check labs, xr, cardio, TBA

## 2022-01-11 NOTE — PATIENT PROFILE ADULT - FALL HARM RISK - HARM RISK INTERVENTIONS

## 2022-01-11 NOTE — H&P ADULT - PROBLEM SELECTOR PLAN 2
Monitor BMP  Renal consult  Avoid nephrotoxic meds Continue iv lasix  Monitor I/Os  Pulmonary consult

## 2022-01-11 NOTE — CONSULT NOTE ADULT - SUBJECTIVE AND OBJECTIVE BOX
History of Present Illness: The patient is a 89 year old male with a history of HTN, HL, DM, CKD, carotid stenosis, DVT, atrial fibrillation, chronic systolic heart failure who presents with weight gain and tachycardia. The patient had been started on furosemide 20 mg PO daily last week for 2 days. He has been having increased urination so furosemide was stopped. He has noted worsening shortness of breath since then. There has been weight gain. His son noted him to be tachycardic to 130s.    Past Medical/Surgical History:  HTN, HL, DM, CKD, carotid stenosis, DVT, atrial fibrillation, chronic systolic heart failure    Medications:  Home Medications:  atorvastatin 20 mg oral tablet: 1 tab(s) orally once a day (19 Dec 2021 18:11)  dilTIAZem 120 mg/24 hours oral capsule, extended release: 1 cap(s) orally once a day (22 Dec 2021 10:13)  gabapentin 100 mg oral capsule: 1 cap(s) orally once a day (19 Dec 2021 18:11)  Januvia 50 mg oral tablet: 1 tab(s) orally once a day (19 Dec 2021 18:11)  lactobacillus acidophilus oral capsule: 1 tab(s) orally 2 times a day (22 Dec 2021 18:34)  pantoprazole 40 mg oral delayed release tablet: 1 tab(s) orally once a day (before a meal) (22 Dec 2021 10:13)  trajenta: 5 milligram(s) orally once a day (19 Dec 2021 18:11)      Family History: Non-contributory family history of premature cardiovascular atherosclerotic disease    Social History: No tobacco, alcohol or drug use    Review of Systems:  General: No fevers, chills, weight gain  Skin: No rashes, color changes  Cardiovascular: No chest pain, orthopnea  Respiratory: +shortness of breath, cough  Gastrointestinal: No nausea, abdominal pain  Genitourinary: No incontinence, pain with urination  Musculoskeletal: No pain, swelling, decreased range of motion  Neurological: No headache, weakness  Psychiatric: No depression, anxiety  Endocrine: No weight gain, increased thirst  All other systems are comprehensively negative.    Physical Exam:  Vitals:        Vital Signs Last 24 Hrs  T(C): 36.9 (11 Jan 2022 12:15), Max: 36.9 (11 Jan 2022 12:15)  T(F): 98.5 (11 Jan 2022 12:15), Max: 98.5 (11 Jan 2022 12:15)  HR: 122 (11 Jan 2022 12:14) (122 - 122)  BP: 116/80 (11 Jan 2022 12:14) (116/80 - 116/80)  BP(mean): --  RR: 20 (11 Jan 2022 12:14) (20 - 20)  SpO2: 94% (11 Jan 2022 12:14) (94% - 94%)  General: NAD  HEENT: MMM  Neck: No JVD, no carotid bruit  Lungs: CTAB  CV: RRR, nl S1/S2, no M/R/G  Abdomen: S/NT/ND, +BS  Extremities: 1+ LE edema, no cyanosis  Neuro: AAOx3, non-focal  Skin: No rash    Labs:                        12.3   8.99  )-----------( 240      ( 11 Jan 2022 13:10 )             37.3                   ECG: AF, LAD, no ST abnormality

## 2022-01-11 NOTE — H&P ADULT - PROBLEM SELECTOR PLAN 4
RISS  Check A1C Continue digoxin, metoprolol and diltiazem for rate control  Continue Eliquis  Cardio consult  Further work-up/management pending clinical course.

## 2022-01-11 NOTE — ED PROVIDER NOTE - OBJECTIVE STATEMENT
89 y/o M with PMH of HTN, Type 2 DM, Dyslipidemia, PAD, CKD stage 3, Melanoma in situ s/p excision, Diverticulitis, Retinal Detachment s/p repair, OA, chronic systolic heart failure and Obesity s/p diagnosed with Afib, L deep femoral vein DVT and mild CHF on 12/19/2021, admitted at [facility] and started on eliquis, digoxin presents with c/o generalized weakness, weight gain and shortness of breath the past few days. PCP, Dr. Richard had d/c'ed digoxin and started on lasix which he took for 2 days last week and then discontinued by cardiologist, Dr. Iglesias due to no issues with respiration and frequent urination. States that he has been taking lasix 20mg daily the past 2 days. States that he has been short of breath since lasix was stopped and was had 4lb weight gain and son stated pt was tachycardic in 130s. Pt admits to dyspnea with exertion. Denies chest pain, fever, cough, calf pain. 89 y/o M with PMH of HTN, Type 2 DM, Dyslipidemia, PAD, CKD stage 3, Melanoma in situ s/p excision, Diverticulitis, Retinal Detachment s/p repair, OA, chronic systolic heart failure and Obesity s/p diagnosed with Afib, L deep femoral vein DVT on eliquis and mild CHF on 12/19/2021, admitted at [facility] and started on eliquis, digoxin presents with c/o generalized weakness, weight gain and shortness of breath the past few days. PCP, Dr. Richard had d/c'ed digoxin and started on lasix which he took for 2 days last week and then discontinued by cardiologist, Dr. Iglesias due to no issues with respiration and frequent urination. States that he has been taking lasix 20mg daily the past 2 days. States that he has been short of breath since lasix was stopped and was had 4lb weight gain and son stated pt was tachycardic in 130s. Pt admits to dyspnea with exertion. Denies chest pain, fever, cough, calf pain. Family spoke with pcp today who advised pt to come in to ED for eval.

## 2022-01-11 NOTE — ED ADULT NURSE NOTE - NSIMPLEMENTINTERV_GEN_ALL_ED
Implemented All Fall with Harm Risk Interventions:  [place] to call system. Call bell, personal items and telephone within reach. Instruct patient to call for assistance. Room bathroom lighting operational. Non-slip footwear when patient is off stretcher. Physically safe environment: no spills, clutter or unnecessary equipment. Stretcher in lowest position, wheels locked, appropriate side rails in place. Provide visual cue, wrist band, yellow gown, etc. Monitor gait and stability. Monitor for mental status changes and reorient to person, place, and time. Review medications for side effects contributing to fall risk. Reinforce activity limits and safety measures with patient and family. Provide visual clues: red socks.

## 2022-01-11 NOTE — ED PROVIDER NOTE - MUSCULOSKELETAL, MLM
Spine appears normal, range of motion is not limited, no muscle or joint tenderness, 1+ pitting edema BLE, +increased swelling R lower compared to left, calf NT B

## 2022-01-11 NOTE — ED PROVIDER NOTE - CLINICAL SUMMARY MEDICAL DECISION MAKING FREE TEXT BOX
89 y/o M with PMH of HTN, Type 2 DM, Dyslipidemia, PAD, CKD stage 3, Melanoma in situ s/p excision, Diverticulitis, Retinal Detachment s/p repair, OA, chronic systolic heart failure and Obesity s/p diagnosed with Afib, L deep femoral vein DVT and mild CHF on 12/19/2021, admitted at [facility] and started on eliquis, digoxin presents with c/o generalized weakness, weight gain and shortness of breath the past few days. PCP, Dr. Richard had d/c'ed digoxin and started on lasix which he took for 2 days last week and then discontinued by cardiologist, Dr. Iglesias due to no issues with respiration and frequent urination. States that he has been taking lasix 20mg daily the past 2 days. States that he has been short of breath since lasix was stopped and was had 4lb weight gain and son stated pt was tachycardic in 130s. Pt admits to dyspnea with exertion. Denies chest pain, fever, cough, calf pain. 89 y/o M with PMH of HTN, Type 2 DM, Dyslipidemia, PAD, CKD stage 3, Melanoma in situ s/p excision, Diverticulitis, Retinal Detachment s/p repair, OA, chronic systolic heart failure and Obesity s/p diagnosed with Afib, L deep femoral vein DVT and mild CHF on 12/19/2021, admitted at [facility] and started on eliquis, digoxin presents with c/o generalized weakness, weight gain and shortness of breath the past few days. PCP, Dr. Richard had d/c'ed digoxin and started on lasix which he took for 2 days last week and then discontinued by cardiologist, Dr. Iglesias due to no issues with respiration and frequent urination. States that he has been taking lasix 20mg daily the past 2 days. States that he has been short of breath since lasix was stopped and was had 4lb weight gain and son stated pt was tachycardic in 130s. Pt admits to dyspnea with exertion. Denies chest pain, fever, cough, calf pain. PE: as above A/P: rapid afib and chf exacerbation, will get labs, bnp, trop, ekg, cxr, lasix, dig, cardiology consult, admit

## 2022-01-11 NOTE — H&P ADULT - PROBLEM SELECTOR PLAN 1
Admit to tele  Continue lasix 40mg ivp qd  Strict I/O  Cardio consult  Further work-up/management pending clinical course.

## 2022-01-11 NOTE — H&P ADULT - PROBLEM SELECTOR PLAN 3
Continue digoxin, metoprolol and diltiazem for rate control  Continue Eliquis  Cardio consult  Further work-up/management pending clinical course. Monitor BMP  Renal consult  Avoid nephrotoxic meds

## 2022-01-11 NOTE — H&P ADULT - NSHPSOURCEINFORD_GEN_ALL_CORE
36 y/o F with PMH anxiety, depression, substance abuse including IVDA, ETOH abuse presents for detox evaluation. She also reports anxiety, but relates the anxiety is unchanged x mos. She reports last drink was 2 beers at 4pm yesterday. She denies other recent drug use. She reports she drink 2-3 beers/day and 1/2 pint of different liquors/day. Denies CP, palpitations, SOB, back pain, abdominal pain, n/v/d, black or bloody stools, fevers, sweats, chills, HA, vision changes, sore throat/difficulty swallowing, confusion, trauma, fall, cough, recent travel, recent illness, sick contacts, leg pain/swelling, urinary symptoms, rash. She also denies SI/HI/ hallucinations. Chart(s)

## 2022-01-11 NOTE — ED ADULT NURSE NOTE - CHPI ED NUR SYMPTOMS NEG
no cough/no back pain/no chest pain/no chills/no congestion/no diaphoresis/no dizziness/no fever/no nausea/no syncope/no vomiting

## 2022-01-12 LAB
ANION GAP SERPL CALC-SCNC: 10 MMOL/L — SIGNIFICANT CHANGE UP (ref 5–17)
BUN SERPL-MCNC: 60 MG/DL — HIGH (ref 7–23)
CALCIUM SERPL-MCNC: 8.2 MG/DL — LOW (ref 8.4–10.5)
CHLORIDE SERPL-SCNC: 105 MMOL/L — SIGNIFICANT CHANGE UP (ref 96–108)
CO2 SERPL-SCNC: 25 MMOL/L — SIGNIFICANT CHANGE UP (ref 22–31)
CREAT SERPL-MCNC: 2.51 MG/DL — HIGH (ref 0.5–1.3)
GLUCOSE BLDC GLUCOMTR-MCNC: 125 MG/DL — HIGH (ref 70–99)
GLUCOSE BLDC GLUCOMTR-MCNC: 127 MG/DL — HIGH (ref 70–99)
GLUCOSE BLDC GLUCOMTR-MCNC: 155 MG/DL — HIGH (ref 70–99)
GLUCOSE BLDC GLUCOMTR-MCNC: 173 MG/DL — HIGH (ref 70–99)
GLUCOSE SERPL-MCNC: 126 MG/DL — HIGH (ref 70–99)
HCT VFR BLD CALC: 38.8 % — LOW (ref 39–50)
HGB BLD-MCNC: 12.4 G/DL — LOW (ref 13–17)
MAGNESIUM SERPL-MCNC: 1.9 MG/DL — SIGNIFICANT CHANGE UP (ref 1.6–2.6)
MCHC RBC-ENTMCNC: 30.2 PG — SIGNIFICANT CHANGE UP (ref 27–34)
MCHC RBC-ENTMCNC: 32 GM/DL — SIGNIFICANT CHANGE UP (ref 32–36)
MCV RBC AUTO: 94.4 FL — SIGNIFICANT CHANGE UP (ref 80–100)
NRBC # BLD: 0 /100 WBCS — SIGNIFICANT CHANGE UP (ref 0–0)
PLATELET # BLD AUTO: 234 K/UL — SIGNIFICANT CHANGE UP (ref 150–400)
POTASSIUM SERPL-MCNC: 4.3 MMOL/L — SIGNIFICANT CHANGE UP (ref 3.5–5.3)
POTASSIUM SERPL-SCNC: 4.3 MMOL/L — SIGNIFICANT CHANGE UP (ref 3.5–5.3)
RBC # BLD: 4.11 M/UL — LOW (ref 4.2–5.8)
RBC # FLD: 15.3 % — HIGH (ref 10.3–14.5)
SODIUM SERPL-SCNC: 140 MMOL/L — SIGNIFICANT CHANGE UP (ref 135–145)
WBC # BLD: 8.04 K/UL — SIGNIFICANT CHANGE UP (ref 3.8–10.5)
WBC # FLD AUTO: 8.04 K/UL — SIGNIFICANT CHANGE UP (ref 3.8–10.5)

## 2022-01-12 PROCEDURE — 99221 1ST HOSP IP/OBS SF/LOW 40: CPT

## 2022-01-12 PROCEDURE — 76770 US EXAM ABDO BACK WALL COMP: CPT | Mod: 26

## 2022-01-12 RX ORDER — DILTIAZEM HCL 120 MG
120 CAPSULE, EXT RELEASE 24 HR ORAL ONCE
Refills: 0 | Status: COMPLETED | OUTPATIENT
Start: 2022-01-12 | End: 2022-01-12

## 2022-01-12 RX ORDER — MAGNESIUM SULFATE 500 MG/ML
2 VIAL (ML) INJECTION ONCE
Refills: 0 | Status: COMPLETED | OUTPATIENT
Start: 2022-01-12 | End: 2022-01-12

## 2022-01-12 RX ORDER — SITAGLIPTIN 50 MG/1
1 TABLET, FILM COATED ORAL
Qty: 0 | Refills: 0 | DISCHARGE

## 2022-01-12 RX ORDER — DILTIAZEM HCL 120 MG
240 CAPSULE, EXT RELEASE 24 HR ORAL DAILY
Refills: 0 | Status: DISCONTINUED | OUTPATIENT
Start: 2022-01-13 | End: 2022-01-13

## 2022-01-12 RX ADMIN — Medication 120 MILLIGRAM(S): at 05:44

## 2022-01-12 RX ADMIN — Medication 125 MICROGRAM(S): at 05:45

## 2022-01-12 RX ADMIN — Medication 1 TABLET(S): at 09:08

## 2022-01-12 RX ADMIN — GABAPENTIN 100 MILLIGRAM(S): 400 CAPSULE ORAL at 11:55

## 2022-01-12 RX ADMIN — Medication 40 MILLIGRAM(S): at 05:44

## 2022-01-12 RX ADMIN — APIXABAN 2.5 MILLIGRAM(S): 2.5 TABLET, FILM COATED ORAL at 05:44

## 2022-01-12 RX ADMIN — Medication 100 MILLIGRAM(S): at 17:08

## 2022-01-12 RX ADMIN — Medication 1 TABLET(S): at 11:55

## 2022-01-12 RX ADMIN — Medication 120 MILLIGRAM(S): at 09:08

## 2022-01-12 RX ADMIN — Medication 100 MILLIGRAM(S): at 05:44

## 2022-01-12 RX ADMIN — Medication 1: at 11:59

## 2022-01-12 RX ADMIN — Medication 25 GRAM(S): at 21:13

## 2022-01-12 RX ADMIN — ATORVASTATIN CALCIUM 20 MILLIGRAM(S): 80 TABLET, FILM COATED ORAL at 21:13

## 2022-01-12 RX ADMIN — Medication 1 TABLET(S): at 17:08

## 2022-01-12 RX ADMIN — APIXABAN 2.5 MILLIGRAM(S): 2.5 TABLET, FILM COATED ORAL at 17:08

## 2022-01-12 RX ADMIN — PANTOPRAZOLE SODIUM 40 MILLIGRAM(S): 20 TABLET, DELAYED RELEASE ORAL at 05:45

## 2022-01-12 NOTE — CONSULT NOTE ADULT - SUBJECTIVE AND OBJECTIVE BOX
Patient is a 89y old  Male who presents with a chief complaint of WERO. CHF (12 Jan 2022 11:03)    Type:2; patient med rec has 2 home medications- januvia and tradjenta. SW son- advised to speak with Dr. Richard PCP. BRE Richard, his records show Januvia. Dr Contacting [facility] to confirm home meds.   DX year known complications Endocrine Last seen Rx home Hx DKA/HHS, Glucometer checks, needs, weight, diet, exercise  diabetes education provided  Hx ASCVD, CKD, HF    HPI:  89 y/o M with PMH of HTN, Type 2 DM, Dyslipidemia, PAD, CKD stage 3, Melanoma in situ s/p excision, Diverticulitis, Retinal Detachment s/p repair, OA, chronic systolic heart failure and Obesity s/p diagnosed with Afib, L deep femoral vein DVT on eliquis and mild CHF on 12/19/2021, admitted at [facility] and started on eliquis, digoxin presents with c/o generalized weakness, weight gain and shortness of breath the past few days. PCP, Dr. Richard had d/c'ed digoxin and started on lasix which he took for 2 days last week and then discontinued by cardiologist, Dr. Iglesias due to no issues with respiration and frequent urination. States that he has been taking lasix 20mg daily the past 2 days. States that he has been short of breath since lasix was stopped and was had 4lb weight gain and son stated pt was tachycardic in 130s. Pt admits to dyspnea with exertion. Denies chest pain, fever, cough, calf pain. Family spoke with pcp today who advised pt to come in to ED for eval. (11 Jan 2022 21:59)      PAST MEDICAL & SURGICAL HISTORY:  HLD (hyperlipidemia)    HTN (hypertension)    Carotid artery disorder  Left carotid congenital anomaly    Osteoarthritis    Melanoma in situ  left thigh    Type 2 diabetes mellitus without complication    History of basal cell carcinoma (BCC)    Basal cell cancer  Moh&#x27;s procedure 4/08/2014    S/P lumbar laminectomy    Retinal detachment  repair - right eye    Dupuytrens contracture  release - both hands        REVIEW OF SYSTEMS  General:	as above  Respiratory: NAD, No SOB, no cough  Cardiovascular: No chest pain, no palpitations	  Endocrine: no polyuria, no polydipsia, or S/S of hypoglycemia        Allergies    aspirin (Anaphylaxis)  penicillin (Rash)    Intolerances        MEDICATIONS  (STANDING):  apixaban 2.5 milliGRAM(s) Oral two times a day  atorvastatin 20 milliGRAM(s) Oral at bedtime  dextrose 40% Gel 15 Gram(s) Oral once  dextrose 5%. 1000 milliLiter(s) (50 mL/Hr) IV Continuous <Continuous>  dextrose 5%. 1000 milliLiter(s) (100 mL/Hr) IV Continuous <Continuous>  dextrose 50% Injectable 25 Gram(s) IV Push once  dextrose 50% Injectable 12.5 Gram(s) IV Push once  dextrose 50% Injectable 25 Gram(s) IV Push once  digoxin     Tablet 125 MICROGram(s) Oral daily  furosemide   Injectable 40 milliGRAM(s) IV Push daily  gabapentin 100 milliGRAM(s) Oral daily  glucagon  Injectable 1 milliGRAM(s) IntraMuscular once  insulin lispro (ADMELOG) corrective regimen sliding scale   SubCutaneous three times a day before meals  insulin lispro (ADMELOG) corrective regimen sliding scale   SubCutaneous at bedtime  lactobacillus acidophilus 1 Tablet(s) Oral three times a day with meals  metoprolol tartrate 100 milliGRAM(s) Oral two times a day  pantoprazole    Tablet 40 milliGRAM(s) Oral before breakfast

## 2022-01-12 NOTE — CONSULT NOTE ADULT - ASSESSMENT
The patient is a 89 year old male with a history of HTN, HL, DM, CKD, carotid stenosis, DVT, atrial fibrillation, chronic systolic heart failure who presents with weight gain and tachycardia in the setting of acute on chronic systolic heart failure, rapid atrial fibrillation.    Plan:  - There is evidence of volume overload on imaging  - CXR with bilateral pleural effusions  - Echo last month with moderately reduced LV systolic function  - ECG with known AF; no evidence of ischemia or infarction  - Check BNP  - HR in the 120-130 range  - Give metoprolol 5 mg IVP now  - Continue metoprolol tartrate 100 mg bid  - Continue diltiazem  mg daily  - Give digoxin 0.25 mg IVP now and continue digoxin 0.125 mg daily (if GFR very low, then do every other day dosing)  - Continue apixaban 2.5 mg bid  - Give furosemide 40 mg IV now and continue daily
HARESH on CKD 4: CRS, ? Hemodynamic ATN, mild urinary retention (baseline creatinine 2.0)  CHF, Pleural effusion  Diabetes  h/o Hypertension, now with low BP  Atrial fibrillation    Worsening renal indices with mandated diuresis. Renal sonogram results noted. Add flomax for mild retention.   Monitor BP trend. Lower diuretic dose as fluid status allows. Monitor blood sugar levels. Insulin coverage as needed. Dietary restriction. Monitor BP trend. Titrate BP meds as needed. Salt restriction. Avoid nephrotoxic meds as possible. Avoid ACEI, ARB, NSAIDs and IV contrast. Will follow electrolytes and renal function trend.   Further recommendations pending clinical course. Thank you for the courtesy of this referral.     
89 y/o M with PMH of HTN, Type 2 DM, Dyslipidemia, PAD, CKD stage 3, Melanoma in situ s/p excision, Diverticulitis, Retinal Detachment s/p repair, OA, chronic systolic heart failure and Obesity s/p diagnosed with Afib, L deep femoral vein DVT on eliquis and mild CHF on 12/19/2021, admitted at [facility] and started on eliquis, digoxin presents with c/o generalized weakness, weight gain and shortness of breath    AF - on eliquis  cvs rx regimen  BP control  CHF management - diuresis - cvs rx regimen optimization  CKD - monitor renal indices - I and O - replete lytes  Pulm edema and atelectasis on CXR -   monitor VS and HD and Sat - keep sat > 88 pct  dietary discretion  DM care - serial FS  LE doppler NEG for DVT  GOC discussion - 
Physical Exam:   Vital Signs Last 24 Hrs  T(C): 36.7 (12 Jan 2022 08:00), Max: 37.1 (11 Jan 2022 20:25)  T(F): 98 (12 Jan 2022 08:00), Max: 98.7 (11 Jan 2022 20:25)  HR: 115 (12 Jan 2022 09:46) (91 - 122)  BP: 98/63 (12 Jan 2022 09:46) (98/63 - 150/82)  BP(mean): 71 (12 Jan 2022 09:46) (71 - 106)  RR: 15 (12 Jan 2022 09:46) (14 - 20)  SpO2: 93% (12 Jan 2022 09:46) (93% - 98%)    General: NAD, denies Fever, chills  CVS: S1S2 no M/R/G  Resp: CTA in all fields       eGFR if Non African American: 21 mL/min/1.73M2 (01-12-22 @ 06:56)  eGFR if : 24 mL/min/1.73M2 (01-12-22 @ 06:56)  eGFR if : 27 mL/min/1.73M2 (01-11-22 @ 13:10)  eGFR if Non African American: 23 mL/min/1.73M2 (01-11-22 @ 13:10)      CAPILLARY BLOOD GLUCOSE      POCT Blood Glucose.: 127 mg/dL (12 Jan 2022 08:02)  POCT Blood Glucose.: 129 mg/dL (11 Jan 2022 21:58)  POCT Blood Glucose.: 117 mg/dL (11 Jan 2022 17:25)  POCT Blood Glucose.: 138 mg/dL (11 Jan 2022 12:32)      Cholesterol, Serum: 113 mg/dL (05.19.21 @ 08:36)     HDL Cholesterol, Serum: 22 mg/dL (05.19.21 @ 08:36)     LDL Cholesterol Calculated: 66 mg/dL (05.19.21 @ 08:36)     DIET: CC  >50%

## 2022-01-12 NOTE — CONSULT NOTE ADULT - SUBJECTIVE AND OBJECTIVE BOX
Patient is a 89y old  Male who presents with a chief complaint of WERO. CHF (12 Jan 2022 11:09)    HPI:  89 y/o M with PMH of HTN, Type 2 DM, Dyslipidemia, PAD, CKD stage 3, Melanoma in situ s/p excision, Diverticulitis, Retinal Detachment s/p repair, OA, chronic systolic heart failure and Obesity s/p diagnosed with Afib, L deep femoral vein DVT on eliquis and mild CHF on 12/19/2021, admitted at [facility] and started on eliquis, digoxin presents with c/o generalized weakness, weight gain and shortness of breath the past few days. PCP, Dr. Richard had d/c'ed digoxin and started on lasix which he took for 2 days last week and then discontinued by cardiologist, Dr. Iglesias due to no issues with respiration and frequent urination. States that he has been taking lasix 20mg daily the past 2 days. States that he has been short of breath since lasix was stopped and was had 4lb weight gain and son stated pt was tachycardic in 130s. Pt admits to dyspnea with exertion. Denies chest pain, fever, cough, calf pain. Family spoke with pcp today who advised pt to come in to ED for eval. (11 Jan 2022 21:59)    Renal consult called for HARESH. History obtained from chart and patient. Pt denies any history of kidney disease.       PAST MEDICAL HISTORY:  HLD (hyperlipidemia)    HTN (hypertension)    Carotid artery disorder    Osteoarthritis    Melanoma in situ    Type 2 diabetes mellitus without complication    History of basal cell carcinoma (BCC)        PAST SURGICAL HISTORY:  Basal cell cancer    S/P lumbar laminectomy    Retinal detachment    Dupuytrens contracture        FAMILY HISTORY:  Family history of diabetes mellitus (Mother)        SOCIAL HISTORY: No smoking or alcohol use     Allergies    aspirin (Anaphylaxis)  penicillin (Rash)  strawberry (Rash)    Intolerances      Home Medications:  atorvastatin 20 mg oral tablet: 1 tab(s) orally once a day (11 Jan 2022 13:48)  dilTIAZem 120 mg/24 hours oral capsule, extended release: 1 cap(s) orally once a day (11 Jan 2022 13:48)  gabapentin 100 mg oral capsule: 1 cap(s) orally once a day (11 Jan 2022 13:48)  lactobacillus acidophilus oral capsule: 1 tab(s) orally 2 times a day (11 Jan 2022 13:48)  pantoprazole 40 mg oral delayed release tablet: 1 tab(s) orally once a day (before a meal) (11 Jan 2022 13:48)  Tradjenta 5 mg oral tablet: 1 tab(s) orally once a day (12 Jan 2022 11:38)    MEDICATIONS  (STANDING):  apixaban 2.5 milliGRAM(s) Oral two times a day  atorvastatin 20 milliGRAM(s) Oral at bedtime  dextrose 40% Gel 15 Gram(s) Oral once  dextrose 5%. 1000 milliLiter(s) (50 mL/Hr) IV Continuous <Continuous>  dextrose 5%. 1000 milliLiter(s) (100 mL/Hr) IV Continuous <Continuous>  dextrose 50% Injectable 25 Gram(s) IV Push once  dextrose 50% Injectable 12.5 Gram(s) IV Push once  dextrose 50% Injectable 25 Gram(s) IV Push once  digoxin     Tablet 125 MICROGram(s) Oral daily  furosemide   Injectable 40 milliGRAM(s) IV Push daily  gabapentin 100 milliGRAM(s) Oral daily  glucagon  Injectable 1 milliGRAM(s) IntraMuscular once  insulin lispro (ADMELOG) corrective regimen sliding scale   SubCutaneous three times a day before meals  insulin lispro (ADMELOG) corrective regimen sliding scale   SubCutaneous at bedtime  lactobacillus acidophilus 1 Tablet(s) Oral three times a day with meals  metoprolol tartrate 100 milliGRAM(s) Oral two times a day  pantoprazole    Tablet 40 milliGRAM(s) Oral before breakfast    MEDICATIONS  (PRN):  acetaminophen     Tablet .. 650 milliGRAM(s) Oral every 6 hours PRN Temp greater or equal to 38C (100.4F), Mild Pain (1 - 3)  aluminum hydroxide/magnesium hydroxide/simethicone Suspension 30 milliLiter(s) Oral every 4 hours PRN Dyspepsia  melatonin 3 milliGRAM(s) Oral at bedtime PRN Insomnia  ondansetron Injectable 4 milliGRAM(s) IV Push every 8 hours PRN Nausea and/or Vomiting      REVIEW OF SYSTEMS:  General: no distress  Respiratory: No cough, SOB  Cardiovascular: No CP or Palpitations	  Gastrointestinal: No nausea, Vomiting. No diarrhea  Genitourinary: No urinary complaints	  Musculoskeletal: No new rash or lesions	  all other systems negative    T(F): 97.1 (01-12-22 @ 16:16), Max: 98.7 (01-11-22 @ 20:25)  HR: 95 (01-12-22 @ 14:00) (91 - 120)  BP: 114/63 (01-12-22 @ 14:00) (98/63 - 150/82)  RR: 19 (01-12-22 @ 12:00) (14 - 20)  SpO2: 98% (01-12-22 @ 13:37) (93% - 98%)  Wt(kg): --    PHYSICAL EXAM:  General: NAD  Respiratory: b/l air entry  Cardiovascular: S1 S2  Gastrointestinal: soft  Extremities: trace edema        01-12    140  |  105  |  60<H>  ----------------------------<  126<H>  4.3   |  25  |  2.51<H>    Ca    8.2<L>      12 Jan 2022 06:56  Mg     1.9     01-12    TPro  6.2  /  Alb  3.1<L>  /  TBili  0.6  /  DBili  x   /  AST  21  /  ALT  31  /  AlkPhos  127<H>  01-11                          12.4   8.04  )-----------( 234      ( 12 Jan 2022 06:56 )             38.8       Calcium, Total Serum: 8.2 mg/dL (01-12 @ 06:56)  Blood Urea Nitrogen, Serum: 60 mg/dL (01-12 @ 06:56)  Potassium, Serum: 4.3 mmol/L (01-12 @ 06:56)  Hematocrit: 38.8 % (01-12 @ 06:56)      Creatinine, Serum: 2.51 (01-12 @ 06:56)  Creatinine, Serum: 2.30 (01-11 @ 13:10)        LIVER FUNCTIONS - ( 11 Jan 2022 13:10 )  Alb: 3.1 g/dL / Pro: 6.2 g/dL / ALK PHOS: 127 U/L / ALT: 31 U/L DA / AST: 21 U/L / GGT: x               I&O's Detail    11 Jan 2022 07:01  -  12 Jan 2022 07:00  --------------------------------------------------------  IN:  Total IN: 0 mL    OUT:    Voided (mL): 800 mL  Total OUT: 800 mL    Total NET: -800 mL      12 Jan 2022 07:01  -  12 Jan 2022 16:20  --------------------------------------------------------  IN:    Oral Fluid: 540 mL  Total IN: 540 mL    OUT:    Voided (mL): 600 mL  Total OUT: 600 mL    Total NET: -60 mL      < from: US Kidney and Bladder (01.12.22 @ 13:56) >    ACC: [id number] EXAM:  US KIDNEYS AND BLADDER                          PROCEDURE DATE:  01/12/2022          INTERPRETATION:  CLINICAL INFORMATION: Acute kidney disease, chronic   kidney disease    COMPARISON: None available.    TECHNIQUE: Sonography of the kidneys and bladder.    FINDINGS:    Right kidney: 10.9 cm. No renal mass, hydronephrosis or calculi.    Left kidney: 10.7 cm. No renal mass, hydronephrosis or calculi.    Urinary bladder: Distends up to 285 cc unremarkable as visualized,   without wall thickening or aberrant intraluminal echoes. Patient had no   urge to void at the time of the examination..    IMPRESSION:    No hydronephrosis bilaterally.        --- End of Report ---            ASIM GIRON MD; Attending Radiologist  This document has been electronically signed. Jan 12 2022  2:29PM    < end of copied text >  < from: Xray Chest 1 View- PORTABLE-Urgent (01.11.22 @ 13:11) >    ACC: [id number] EXAM:  XR CHEST PORTABLE URGENT 1V                          PROCEDURE DATE:  01/11/2022          INTERPRETATION:  Chest pain.    AP chest.      IMPRESSION:  Heart magnified by AP film shallow inspiration. Mild vascular congestion.    moderate right pleural effusion. Bibasilar atelectasis/airspace disease.   Correlate clinically for infection. Advanced degenerative change left   glenohumeral joint.    --- End of Report ---            ASIM GIRON MD; Attending Radiologist  This document has been electronically signed. Jan 11 2022  2:52PM    < end of copied text >

## 2022-01-12 NOTE — CONSULT NOTE ADULT - PROBLEM SELECTOR RECOMMENDATION 9
Type 2 A1c %   Will clarify if patient on Januvia/Tradjenta  FU appt: [facility]  DSC recommendations: return to home regimen and glucose monitoring  diabetes education provided  Diabetes support info and cell # [phone] given   Goal 100-180 mg/dL; 140-180 mg/dL in critical care areas

## 2022-01-12 NOTE — PHYSICAL THERAPY INITIAL EVALUATION ADULT - ADDITIONAL COMMENTS
Pt lives in a house w/ 6 steps to enter. Stairs have a chair lift. No steps inside. Adult son assist pt and also 24 hr aid. Owns a RW.

## 2022-01-12 NOTE — CONSULT NOTE ADULT - CONSULT REASON
89y A1C with Estimated Average Glucose Result: 7.1 % (12-20-21 @ 12:50)   diabetes mellitus type 2
Acute on chronic systolic heart failure, rapid atrial fibrillation
HARESH
CHF  SOB  HAMMOND  Atelectasis

## 2022-01-12 NOTE — CONSULT NOTE ADULT - SUBJECTIVE AND OBJECTIVE BOX
Date/Time Patient Seen:  		  Referring MD:   Data Reviewed	       Patient is a 89y old  Male who presents with a chief complaint of WERO. CHF (11 Jan 2022 21:59)      Subjective/HPI    H and P reviewed  ER provider note reviewed  pt is known to me from prior admissions  cxr and labs reviewed  vs noted       History and Physical:   Source of Information	Chart(s)  Outpatient Providers	Dr Richard       Patient Identity:  · Birth Sex	Male     History of Present Illness:  Reason for Admission: WERO. CHF  History of Present Illness:   89 y/o M with PMH of HTN, Type 2 DM, Dyslipidemia, PAD, CKD stage 3, Melanoma in situ s/p excision, Diverticulitis, Retinal Detachment s/p repair, OA, chronic systolic heart failure and Obesity s/p diagnosed with Afib, L deep femoral vein DVT on eliquis and mild CHF on 12/19/2021, admitted at [facility] and started on eliquis, digoxin presents with c/o generalized weakness, weight gain and shortness of breath the past few days. PCP, Dr. Richard had d/c'ed digoxin and started on lasix which he took for 2 days last week and then discontinued by cardiologist, Dr. Iglesias due to no issues with respiration and frequent urination. States that he has been taking lasix 20mg daily the past 2 days. States that he has been short of breath since lasix was stopped and was had 4lb weight gain and son stated pt was tachycardic in 130s. Pt admits to dyspnea with exertion. Denies chest pain, fever, cough, calf pain. Family spoke with pcp today who advised pt to come in to ED for eval.  PAST MEDICAL & SURGICAL HISTORY:  HLD (hyperlipidemia)    HTN (hypertension)    Carotid artery disorder  Left carotid congenital anomaly    Osteoarthritis    Melanoma in situ  left thigh    Type 2 diabetes mellitus without complication    History of basal cell carcinoma (BCC)    Basal cell cancer  Moh&#x27;s procedure 4/08/2014    S/P lumbar laminectomy    Retinal detachment  repair - right eye    Dupuytrens contracture  release - both hands          Medication list         MEDICATIONS  (STANDING):  apixaban 2.5 milliGRAM(s) Oral two times a day  atorvastatin 20 milliGRAM(s) Oral at bedtime  dextrose 40% Gel 15 Gram(s) Oral once  dextrose 5%. 1000 milliLiter(s) (50 mL/Hr) IV Continuous <Continuous>  dextrose 5%. 1000 milliLiter(s) (100 mL/Hr) IV Continuous <Continuous>  dextrose 50% Injectable 25 Gram(s) IV Push once  dextrose 50% Injectable 12.5 Gram(s) IV Push once  dextrose 50% Injectable 25 Gram(s) IV Push once  digoxin     Tablet 125 MICROGram(s) Oral daily  diltiazem    milliGRAM(s) Oral daily  furosemide   Injectable 40 milliGRAM(s) IV Push daily  gabapentin 100 milliGRAM(s) Oral daily  glucagon  Injectable 1 milliGRAM(s) IntraMuscular once  insulin lispro (ADMELOG) corrective regimen sliding scale   SubCutaneous three times a day before meals  insulin lispro (ADMELOG) corrective regimen sliding scale   SubCutaneous at bedtime  lactobacillus acidophilus 1 Tablet(s) Oral three times a day with meals  metoprolol tartrate 100 milliGRAM(s) Oral two times a day  pantoprazole    Tablet 40 milliGRAM(s) Oral before breakfast    MEDICATIONS  (PRN):  acetaminophen     Tablet .. 650 milliGRAM(s) Oral every 6 hours PRN Temp greater or equal to 38C (100.4F), Mild Pain (1 - 3)  aluminum hydroxide/magnesium hydroxide/simethicone Suspension 30 milliLiter(s) Oral every 4 hours PRN Dyspepsia  melatonin 3 milliGRAM(s) Oral at bedtime PRN Insomnia  ondansetron Injectable 4 milliGRAM(s) IV Push every 8 hours PRN Nausea and/or Vomiting         Vitals log        ICU Vital Signs Last 24 Hrs  T(C): 36.6 (12 Jan 2022 04:00), Max: 37.1 (11 Jan 2022 20:25)  T(F): 97.8 (12 Jan 2022 04:00), Max: 98.7 (11 Jan 2022 20:25)  HR: 106 (12 Jan 2022 06:00) (91 - 122)  BP: 137/87 (12 Jan 2022 06:00) (107/77 - 150/82)  BP(mean): 100 (12 Jan 2022 06:00) (87 - 100)  ABP: --  ABP(mean): --  RR: 17 (12 Jan 2022 06:00) (14 - 20)  SpO2: 96% (12 Jan 2022 06:00) (94% - 98%)     FAMILY HISTORY:  Mother  Still living? Unknown  Family history of diabetes mellitus, Age at diagnosis: Age Unknown.     Tobacco Screening:  · Core Measure Site	No    Risk Assessment:    Present on Admission:  Deep Venous Thrombosis	no  Pulmonary Embolus	no     Heart Failure:  Does this patient have a history of or has been diagnosed with heart failure? yes.     LV Function Assessment (LVS function was evaluated before arrival and/or during hospitalization) yes.     Is the Ejection Fraction >40% ? yes.     mildly reduced LV function.      Input and Output:  I&O's Detail    11 Jan 2022 07:01  -  12 Jan 2022 07:00  --------------------------------------------------------  IN:  Total IN: 0 mL    OUT:    Voided (mL): 800 mL  Total OUT: 800 mL    Total NET: -800 mL          Lab Data                        12.4   8.04  )-----------( 234      ( 12 Jan 2022 06:56 )             38.8     01-11    140  |  107  |  59<H>  ----------------------------<  142<H>  5.2   |  22  |  2.30<H>    Ca    8.0<L>      11 Jan 2022 13:10    TPro  6.2  /  Alb  3.1<L>  /  TBili  0.6  /  DBili  x   /  AST  21  /  ALT  31  /  AlkPhos  127<H>  01-11            Review of Systems	      Objective     Physical Examination        Pertinent Lab findings & Imaging      Rosa Elena:  NO   Adequate UO     I&O's Detail    11 Jan 2022 07:01  -  12 Jan 2022 07:00  --------------------------------------------------------  IN:  Total IN: 0 mL    OUT:    Voided (mL): 800 mL  Total OUT: 800 mL    Total NET: -800 mL               Discussed with:     Cultures:	        Radiology      ACC: [id number] EXAM:  XR CHEST PORTABLE URGENT 1V                          PROCEDURE DATE:  01/11/2022          INTERPRETATION:  Chest pain.    AP chest.      IMPRESSION:  Heart magnified by AP film shallow inspiration. Mild vascular congestion.    moderate right pleural effusion. Bibasilar atelectasis/airspace disease.   Correlate clinically for infection. Advanced degenerative change left   glenohumeral joint.    --- End of Report ---            ASIM GIRON MD; Attending Radiologist  This document has been electronically signed. Jan 11 2022  2:52PM                         Date/Time Patient Seen:  		  Referring MD:   Data Reviewed	       Patient is a 89y old  Male who presents with a chief complaint of WERO. CHF (11 Jan 2022 21:59)      Subjective/HPI    H and P reviewed  ER provider note reviewed  pt is known to me from prior admissions  cxr and labs reviewed  vs noted       History and Physical:   Source of Information	Chart(s)  Outpatient Providers	Dr Richard       Patient Identity:  · Birth Sex	Male     History of Present Illness:  Reason for Admission: WERO. CHF  History of Present Illness:   89 y/o M with PMH of HTN, Type 2 DM, Dyslipidemia, PAD, CKD stage 3, Melanoma in situ s/p excision, Diverticulitis, Retinal Detachment s/p repair, OA, chronic systolic heart failure and Obesity s/p diagnosed with Afib, L deep femoral vein DVT on eliquis and mild CHF on 12/19/2021, admitted at [facility] and started on eliquis, digoxin presents with c/o generalized weakness, weight gain and shortness of breath the past few days. PCP, Dr. Richard had d/c'ed digoxin and started on lasix which he took for 2 days last week and then discontinued by cardiologist, Dr. Iglesias due to no issues with respiration and frequent urination. States that he has been taking lasix 20mg daily the past 2 days. States that he has been short of breath since lasix was stopped and was had 4lb weight gain and son stated pt was tachycardic in 130s. Pt admits to dyspnea with exertion. Denies chest pain, fever, cough, calf pain. Family spoke with pcp today who advised pt to come in to ED for eval.  PAST MEDICAL & SURGICAL HISTORY:  HLD (hyperlipidemia)    HTN (hypertension)    Carotid artery disorder  Left carotid congenital anomaly    Osteoarthritis    Melanoma in situ  left thigh    Type 2 diabetes mellitus without complication    History of basal cell carcinoma (BCC)    Basal cell cancer  Moh&#x27;s procedure 4/08/2014    S/P lumbar laminectomy    Retinal detachment  repair - right eye    Dupuytrens contracture  release - both hands          Medication list         MEDICATIONS  (STANDING):  apixaban 2.5 milliGRAM(s) Oral two times a day  atorvastatin 20 milliGRAM(s) Oral at bedtime  dextrose 40% Gel 15 Gram(s) Oral once  dextrose 5%. 1000 milliLiter(s) (50 mL/Hr) IV Continuous <Continuous>  dextrose 5%. 1000 milliLiter(s) (100 mL/Hr) IV Continuous <Continuous>  dextrose 50% Injectable 25 Gram(s) IV Push once  dextrose 50% Injectable 12.5 Gram(s) IV Push once  dextrose 50% Injectable 25 Gram(s) IV Push once  digoxin     Tablet 125 MICROGram(s) Oral daily  diltiazem    milliGRAM(s) Oral daily  furosemide   Injectable 40 milliGRAM(s) IV Push daily  gabapentin 100 milliGRAM(s) Oral daily  glucagon  Injectable 1 milliGRAM(s) IntraMuscular once  insulin lispro (ADMELOG) corrective regimen sliding scale   SubCutaneous three times a day before meals  insulin lispro (ADMELOG) corrective regimen sliding scale   SubCutaneous at bedtime  lactobacillus acidophilus 1 Tablet(s) Oral three times a day with meals  metoprolol tartrate 100 milliGRAM(s) Oral two times a day  pantoprazole    Tablet 40 milliGRAM(s) Oral before breakfast    MEDICATIONS  (PRN):  acetaminophen     Tablet .. 650 milliGRAM(s) Oral every 6 hours PRN Temp greater or equal to 38C (100.4F), Mild Pain (1 - 3)  aluminum hydroxide/magnesium hydroxide/simethicone Suspension 30 milliLiter(s) Oral every 4 hours PRN Dyspepsia  melatonin 3 milliGRAM(s) Oral at bedtime PRN Insomnia  ondansetron Injectable 4 milliGRAM(s) IV Push every 8 hours PRN Nausea and/or Vomiting         Vitals log        ICU Vital Signs Last 24 Hrs  T(C): 36.6 (12 Jan 2022 04:00), Max: 37.1 (11 Jan 2022 20:25)  T(F): 97.8 (12 Jan 2022 04:00), Max: 98.7 (11 Jan 2022 20:25)  HR: 106 (12 Jan 2022 06:00) (91 - 122)  BP: 137/87 (12 Jan 2022 06:00) (107/77 - 150/82)  BP(mean): 100 (12 Jan 2022 06:00) (87 - 100)  ABP: --  ABP(mean): --  RR: 17 (12 Jan 2022 06:00) (14 - 20)  SpO2: 96% (12 Jan 2022 06:00) (94% - 98%)     FAMILY HISTORY:  Mother  Still living? Unknown  Family history of diabetes mellitus, Age at diagnosis: Age Unknown.     Tobacco Screening:  · Core Measure Site	No    Risk Assessment:    Present on Admission:  Deep Venous Thrombosis	no  Pulmonary Embolus	no     Heart Failure:  Does this patient have a history of or has been diagnosed with heart failure? yes.     LV Function Assessment (LVS function was evaluated before arrival and/or during hospitalization) yes.     Is the Ejection Fraction >40% ? yes.     mildly reduced LV function.      Input and Output:  I&O's Detail    11 Jan 2022 07:01  -  12 Jan 2022 07:00  --------------------------------------------------------  IN:  Total IN: 0 mL    OUT:    Voided (mL): 800 mL  Total OUT: 800 mL    Total NET: -800 mL          Lab Data                        12.4   8.04  )-----------( 234      ( 12 Jan 2022 06:56 )             38.8     01-11    140  |  107  |  59<H>  ----------------------------<  142<H>  5.2   |  22  |  2.30<H>    Ca    8.0<L>      11 Jan 2022 13:10    TPro  6.2  /  Alb  3.1<L>  /  TBili  0.6  /  DBili  x   /  AST  21  /  ALT  31  /  AlkPhos  127<H>  01-11            Review of Systems	  weakness  sob  mendoza      Objective     Physical Examination    heart s1s2  lung dec BS  abd soft  head nc  verbal  on RA at time of exam      Pertinent Lab findings & Imaging      Rosa Elena:  NO   Adequate UO     I&O's Detail    11 Jan 2022 07:01  -  12 Jan 2022 07:00  --------------------------------------------------------  IN:  Total IN: 0 mL    OUT:    Voided (mL): 800 mL  Total OUT: 800 mL    Total NET: -800 mL               Discussed with:     Cultures:	        Radiology      ACC: [id number] EXAM:  XR CHEST PORTABLE URGENT 1V                          PROCEDURE DATE:  01/11/2022          INTERPRETATION:  Chest pain.    AP chest.      IMPRESSION:  Heart magnified by AP film shallow inspiration. Mild vascular congestion.    moderate right pleural effusion. Bibasilar atelectasis/airspace disease.   Correlate clinically for infection. Advanced degenerative change left   glenohumeral joint.    --- End of Report ---            ASIM GIRON MD; Attending Radiologist  This document has been electronically signed. Jan 11 2022  2:52PM

## 2022-01-13 ENCOUNTER — TRANSCRIPTION ENCOUNTER (OUTPATIENT)
Age: 87
End: 2022-01-13

## 2022-01-13 VITALS
HEART RATE: 98 BPM | OXYGEN SATURATION: 96 % | RESPIRATION RATE: 20 BRPM | SYSTOLIC BLOOD PRESSURE: 100 MMHG | DIASTOLIC BLOOD PRESSURE: 68 MMHG

## 2022-01-13 LAB
ANION GAP SERPL CALC-SCNC: 8 MMOL/L — SIGNIFICANT CHANGE UP (ref 5–17)
BUN SERPL-MCNC: 56 MG/DL — HIGH (ref 7–23)
CALCIUM SERPL-MCNC: 7.8 MG/DL — LOW (ref 8.4–10.5)
CHLORIDE SERPL-SCNC: 105 MMOL/L — SIGNIFICANT CHANGE UP (ref 96–108)
CO2 SERPL-SCNC: 26 MMOL/L — SIGNIFICANT CHANGE UP (ref 22–31)
CREAT SERPL-MCNC: 2.25 MG/DL — HIGH (ref 0.5–1.3)
GLUCOSE BLDC GLUCOMTR-MCNC: 129 MG/DL — HIGH (ref 70–99)
GLUCOSE BLDC GLUCOMTR-MCNC: 215 MG/DL — HIGH (ref 70–99)
GLUCOSE SERPL-MCNC: 133 MG/DL — HIGH (ref 70–99)
MAGNESIUM SERPL-MCNC: 2.1 MG/DL — SIGNIFICANT CHANGE UP (ref 1.6–2.6)
PHOSPHATE SERPL-MCNC: 3.7 MG/DL — SIGNIFICANT CHANGE UP (ref 2.5–4.5)
POTASSIUM SERPL-MCNC: 4.1 MMOL/L — SIGNIFICANT CHANGE UP (ref 3.5–5.3)
POTASSIUM SERPL-SCNC: 4.1 MMOL/L — SIGNIFICANT CHANGE UP (ref 3.5–5.3)
SODIUM SERPL-SCNC: 139 MMOL/L — SIGNIFICANT CHANGE UP (ref 135–145)

## 2022-01-13 PROCEDURE — 80048 BASIC METABOLIC PNL TOTAL CA: CPT

## 2022-01-13 PROCEDURE — 97161 PT EVAL LOW COMPLEX 20 MIN: CPT

## 2022-01-13 PROCEDURE — 93005 ELECTROCARDIOGRAM TRACING: CPT

## 2022-01-13 PROCEDURE — 97116 GAIT TRAINING THERAPY: CPT

## 2022-01-13 PROCEDURE — 83735 ASSAY OF MAGNESIUM: CPT

## 2022-01-13 PROCEDURE — 36415 COLL VENOUS BLD VENIPUNCTURE: CPT

## 2022-01-13 PROCEDURE — 99285 EMERGENCY DEPT VISIT HI MDM: CPT

## 2022-01-13 PROCEDURE — 96374 THER/PROPH/DIAG INJ IV PUSH: CPT

## 2022-01-13 PROCEDURE — 96375 TX/PRO/DX INJ NEW DRUG ADDON: CPT

## 2022-01-13 PROCEDURE — 85610 PROTHROMBIN TIME: CPT

## 2022-01-13 PROCEDURE — 71045 X-RAY EXAM CHEST 1 VIEW: CPT

## 2022-01-13 PROCEDURE — 85025 COMPLETE CBC W/AUTO DIFF WBC: CPT

## 2022-01-13 PROCEDURE — 87635 SARS-COV-2 COVID-19 AMP PRB: CPT

## 2022-01-13 PROCEDURE — 93970 EXTREMITY STUDY: CPT

## 2022-01-13 PROCEDURE — 82962 GLUCOSE BLOOD TEST: CPT

## 2022-01-13 PROCEDURE — 84484 ASSAY OF TROPONIN QUANT: CPT

## 2022-01-13 PROCEDURE — 83880 ASSAY OF NATRIURETIC PEPTIDE: CPT

## 2022-01-13 PROCEDURE — 85027 COMPLETE CBC AUTOMATED: CPT

## 2022-01-13 PROCEDURE — 76770 US EXAM ABDO BACK WALL COMP: CPT

## 2022-01-13 PROCEDURE — 85730 THROMBOPLASTIN TIME PARTIAL: CPT

## 2022-01-13 PROCEDURE — 84100 ASSAY OF PHOSPHORUS: CPT

## 2022-01-13 PROCEDURE — 80053 COMPREHEN METABOLIC PANEL: CPT

## 2022-01-13 PROCEDURE — 97110 THERAPEUTIC EXERCISES: CPT

## 2022-01-13 RX ORDER — FUROSEMIDE 40 MG
40 TABLET ORAL DAILY
Refills: 0 | Status: DISCONTINUED | OUTPATIENT
Start: 2022-01-14 | End: 2022-01-13

## 2022-01-13 RX ORDER — DIGOXIN 250 MCG
1 TABLET ORAL
Qty: 30 | Refills: 0
Start: 2022-01-13 | End: 2022-02-11

## 2022-01-13 RX ORDER — FUROSEMIDE 40 MG
1 TABLET ORAL
Qty: 30 | Refills: 0
Start: 2022-01-13 | End: 2022-02-11

## 2022-01-13 RX ORDER — DILTIAZEM HCL 120 MG
1 CAPSULE, EXT RELEASE 24 HR ORAL
Qty: 30 | Refills: 0
Start: 2022-01-13 | End: 2022-02-11

## 2022-01-13 RX ADMIN — Medication 125 MICROGRAM(S): at 05:39

## 2022-01-13 RX ADMIN — GABAPENTIN 100 MILLIGRAM(S): 400 CAPSULE ORAL at 11:15

## 2022-01-13 RX ADMIN — Medication 240 MILLIGRAM(S): at 05:40

## 2022-01-13 RX ADMIN — Medication 40 MILLIGRAM(S): at 05:39

## 2022-01-13 RX ADMIN — Medication 1 TABLET(S): at 08:02

## 2022-01-13 RX ADMIN — Medication 2: at 11:14

## 2022-01-13 RX ADMIN — PANTOPRAZOLE SODIUM 40 MILLIGRAM(S): 20 TABLET, DELAYED RELEASE ORAL at 08:02

## 2022-01-13 RX ADMIN — Medication 1 TABLET(S): at 11:15

## 2022-01-13 RX ADMIN — Medication 100 MILLIGRAM(S): at 05:40

## 2022-01-13 RX ADMIN — APIXABAN 2.5 MILLIGRAM(S): 2.5 TABLET, FILM COATED ORAL at 05:40

## 2022-01-13 NOTE — DISCHARGE NOTE PROVIDER - NSDCMRMEDTOKEN_GEN_ALL_CORE_FT
apixaban 2.5 mg oral tablet: 1 tab(s) orally every 12 hours  atorvastatin 20 mg oral tablet: 1 tab(s) orally once a day  digoxin 125 mcg (0.125 mg) oral tablet: 1 tab(s) orally once a day  dilTIAZem 120 mg/24 hours oral capsule, extended release: 1 cap(s) orally once a day  gabapentin 100 mg oral capsule: 1 cap(s) orally once a day  lactobacillus acidophilus oral capsule: 1 tab(s) orally 2 times a day  metoprolol tartrate 100 mg oral tablet: 1 tab(s) orally 2 times a day  pantoprazole 40 mg oral delayed release tablet: 1 tab(s) orally once a day (before a meal)  Tradjenta 5 mg oral tablet: 1 tab(s) orally once a day   apixaban 2.5 mg oral tablet: 1 tab(s) orally every 12 hours  atorvastatin 20 mg oral tablet: 1 tab(s) orally once a day  digoxin 125 mcg (0.125 mg) oral tablet: 1 tab(s) orally once a day  dilTIAZem 240 mg/24 hours oral capsule, extended release: 1 cap(s) orally once a day  furosemide 40 mg oral tablet: 1 tab(s) orally once a day  gabapentin 100 mg oral capsule: 1 cap(s) orally once a day  lactobacillus acidophilus oral capsule: 1 tab(s) orally 2 times a day  metoprolol tartrate 100 mg oral tablet: 1 tab(s) orally 2 times a day  pantoprazole 40 mg oral delayed release tablet: 1 tab(s) orally once a day (before a meal)  Tradjenta 5 mg oral tablet: 1 tab(s) orally once a day

## 2022-01-13 NOTE — PROGRESS NOTE ADULT - PROBLEM SELECTOR PLAN 4
Continue digoxin, metoprolol for rate control  Increase diltiazem to 240mg qd  Continue Eliquis  Cardio consult noted  Further work-up/management pending clinical course.
Continue digoxin, metoprolol for rate control  Continue  diltiazem to 240mg qd  Continue Eliquis  Cardio consult noted

## 2022-01-13 NOTE — PROGRESS NOTE ADULT - PROBLEM SELECTOR PLAN 1
Change to po lasix 40mg qd  Clinically improved
Continue lasix 40mg ivp qd  Strict I/O  Cardio consult noted  Further work-up/management pending clinical course.

## 2022-01-13 NOTE — PROGRESS NOTE ADULT - ASSESSMENT
89 y/o M with PMH of HTN, Type 2 DM, Dyslipidemia, PAD, CKD stage 3, Melanoma in situ s/p excision, Diverticulitis, Retinal Detachment s/p repair, OA, chronic systolic heart failure and Obesity s/p diagnosed with Afib, L deep femoral vein DVT on eliquis and mild CHF on 12/19/2021, admitted at [facility] and started on eliquis, digoxin presents with c/o generalized weakness, weight gain and shortness of breath    renal cx noted  US renal - no hydro  I and O  VS noted    AF - on eliquis  cvs rx regimen  BP control  CHF management - diuresis - cvs rx regimen optimization  CKD - monitor renal indices - I and O - replete lytes  Pulm edema and atelectasis on CXR -   monitor VS and HD and Sat - keep sat > 88 pct  dietary discretion  DM care - serial FS  LE doppler NEG for DVT  GOC discussion -       
The patient is a 89 year old male with a history of HTN, HL, DM, CKD, carotid stenosis, DVT, atrial fibrillation, chronic systolic heart failure who presents with weight gain and tachycardia in the setting of acute on chronic systolic heart failure, rapid atrial fibrillation.    Plan:  - CXR with bilateral pleural effusions  - Echo last month with moderately reduced LV systolic function  - HR improved although mildly tachycardic  - Continue metoprolol tartrate 100 mg bid  - Increase diltiazem CD to 240 mg daily  - Continue digoxin 0.125 mg daily  - Continue apixaban 2.5 mg bid  - Give furosemide 40 mg IV daily  - Possible discharge home tomorrow if continues to do well
The patient is a 89 year old male with a history of HTN, HL, DM, CKD, carotid stenosis, DVT, atrial fibrillation, chronic systolic heart failure who presents with weight gain and tachycardia in the setting of acute on chronic systolic heart failure, rapid atrial fibrillation.    Plan:  - CXR with bilateral pleural effusions  - Echo last month with moderately reduced LV systolic function  - Remains mildly tachycardic in the 100-110 range  - Continue metoprolol tartrate 100 mg bid  - Continue diltiazem  mg daily - will not increase further as SBP in the  rnage  - Continue digoxin 0.125 mg daily  - Continue apixaban 2.5 mg bid  - Lower furosemide to 40 mg PO daily  - Discharge planning
89 year old male with a history of HTN, HL, DM, CKD, carotid stenosis, DVT, atrial fibrillation, chronic systolic heart failure who presents with weight gain and tachycardia in the setting of acute on chronic systolic heart failure, rapid atrial fibrillation. Found to have acute CHF exacerbation.     Problem List:  1) acute systolic CHF  2) Pleural effusions  3) HARESH on CKD  4) A-fib with RVR  5) DM2    Diurese with lasix 40mg IVP daily, making urine, negative 1L   watch electrolytes closely, give 2g mag now  Cardizem and metoprolol Po for HR control, seems to be working  O2 as needed currently on 3L, wean as tolerated  HARESH on CKD suspect due to overload from CHF, diurese and assess response  May need to increase diurese to lasix 40mg BID IVP or to 60mg-80mg as his Cr is 2.5, may need larger dose of lasix to obtain affect   Will watch urine output   insulin for glucose management   labs in AM  eliquis for A-fib, digoxin  PT eval

## 2022-01-13 NOTE — PROGRESS NOTE ADULT - PROBLEM SELECTOR PLAN 3
Monitor BMP  Renal consult  Avoid nephrotoxic meds
stable  Renal consult noted  Avoid nephrotoxic meds

## 2022-01-13 NOTE — PROGRESS NOTE ADULT - PROBLEM SELECTOR PROBLEM 2
Pleural effusion due to CHF (congestive heart failure)
Pleural effusion due to CHF (congestive heart failure)

## 2022-01-13 NOTE — PROGRESS NOTE ADULT - SUBJECTIVE AND OBJECTIVE BOX
Chief Complaint: Weight gain, shortness of breath    Interval Events: No events overnight. Breathing improved.    Review of Systems:  General: No fevers, chills, weight gain  Skin: No rashes, color changes  Cardiovascular: No chest pain, orthopnea  Respiratory: No shortness of breath, cough  Gastrointestinal: No nausea, abdominal pain  Genitourinary: No incontinence, pain with urination  Musculoskeletal: No pain, swelling, decreased range of motion  Neurological: No headache, weakness  Psychiatric: No depression, anxiety  Endocrine: No weight gain, increased thirst  All other systems are comprehensively negative.    Physical Exam:  Vitals:        Vital Signs Last 24 Hrs  T(C): 36.7 (12 Jan 2022 08:00), Max: 37.1 (11 Jan 2022 20:25)  T(F): 98 (12 Jan 2022 08:00), Max: 98.7 (11 Jan 2022 20:25)  HR: 115 (12 Jan 2022 09:46) (91 - 122)  BP: 98/63 (12 Jan 2022 09:46) (98/63 - 150/82)  BP(mean): 71 (12 Jan 2022 09:46) (71 - 106)  RR: 15 (12 Jan 2022 09:46) (14 - 20)  SpO2: 93% (12 Jan 2022 09:46) (93% - 98%)  General: NAD  HEENT: MMM  Neck: No JVD, no carotid bruit  Lungs: CTAB  CV: RRR, nl S1/S2, no M/R/G  Abdomen: S/NT/ND, +BS  Extremities: No LE edema, no cyanosis  Neuro: AAOx3, non-focal  Skin: No rash    Labs:                        12.4   8.04  )-----------( 234      ( 12 Jan 2022 06:56 )             38.8     01-12    140  |  105  |  60<H>  ----------------------------<  126<H>  4.3   |  25  |  2.51<H>    Ca    8.2<L>      12 Jan 2022 06:56  Mg     1.9     01-12    TPro  6.2  /  Alb  3.1<L>  /  TBili  0.6  /  DBili  x   /  AST  21  /  ALT  31  /  AlkPhos  127<H>  01-11        PT/INR - ( 11 Jan 2022 13:10 )   PT: 20.8 sec;   INR: 1.77 ratio         PTT - ( 11 Jan 2022 13:10 )  PTT:34.7 sec    Telemetry: -110
Patient is a 89y old  Male who presents with a chief complaint of WERO. CHF (12 Jan 2022 13:14)      BRIEF HOSPITAL COURSE: 89 year old male with a history of HTN, HL, DM, CKD, carotid stenosis, DVT, atrial fibrillation, chronic systolic heart failure who presents with weight gain and tachycardia in the setting of acute on chronic systolic heart failure, rapid atrial fibrillation.    Events last 24 hours: making urine, HR improved, fio2 being weaned. patient found sleeping, easily awoken, offers no complaints     PAST MEDICAL & SURGICAL HISTORY:  HLD (hyperlipidemia)    HTN (hypertension)    Carotid artery disorder  Left carotid congenital anomaly    Osteoarthritis    Melanoma in situ  left thigh    Type 2 diabetes mellitus without complication    History of basal cell carcinoma (BCC)    Basal cell cancer  Moh&#x27;s procedure 4/08/2014    S/P lumbar laminectomy    Retinal detachment  repair - right eye    Dupuytrens contracture  release - both hands        Review of Systems:  CONSTITUTIONAL: No fever, chills, or fatigue  EYES: No eye pain, visual disturbances, or discharge  ENMT:  No difficulty hearing, tinnitus, vertigo; No sinus or throat pain  NECK: No pain or stiffness  RESPIRATORY: No cough, wheezing, chills or hemoptysis; No shortness of breath  CARDIOVASCULAR: No chest pain, palpitations, dizziness, or leg swelling  GASTROINTESTINAL: No abdominal or epigastric pain. No nausea, vomiting, or hematemesis; No diarrhea or constipation. No melena or hematochezia.  GENITOURINARY: No dysuria, frequency, hematuria, or incontinence  NEUROLOGICAL: No headaches, memory loss, loss of strength, numbness, or tremors  SKIN: No itching, burning, rashes, or lesions   MUSCULOSKELETAL: No joint pain or swelling; No muscle, back, or extremity pain  PSYCHIATRIC: No depression, anxiety, mood swings, or difficulty sleeping      Medications:    digoxin     Tablet 125 MICROGram(s) Oral daily  furosemide   Injectable 40 milliGRAM(s) IV Push daily  metoprolol tartrate 100 milliGRAM(s) Oral two times a day      acetaminophen     Tablet .. 650 milliGRAM(s) Oral every 6 hours PRN  gabapentin 100 milliGRAM(s) Oral daily  melatonin 3 milliGRAM(s) Oral at bedtime PRN  ondansetron Injectable 4 milliGRAM(s) IV Push every 8 hours PRN      apixaban 2.5 milliGRAM(s) Oral two times a day    aluminum hydroxide/magnesium hydroxide/simethicone Suspension 30 milliLiter(s) Oral every 4 hours PRN  pantoprazole    Tablet 40 milliGRAM(s) Oral before breakfast      atorvastatin 20 milliGRAM(s) Oral at bedtime  dextrose 40% Gel 15 Gram(s) Oral once  dextrose 50% Injectable 25 Gram(s) IV Push once  dextrose 50% Injectable 12.5 Gram(s) IV Push once  dextrose 50% Injectable 25 Gram(s) IV Push once  glucagon  Injectable 1 milliGRAM(s) IntraMuscular once  insulin lispro (ADMELOG) corrective regimen sliding scale   SubCutaneous three times a day before meals  insulin lispro (ADMELOG) corrective regimen sliding scale   SubCutaneous at bedtime    dextrose 5%. 1000 milliLiter(s) IV Continuous <Continuous>  dextrose 5%. 1000 milliLiter(s) IV Continuous <Continuous>  magnesium sulfate  IVPB 2 Gram(s) IV Intermittent once        lactobacillus acidophilus 1 Tablet(s) Oral three times a day with meals          ICU Vital Signs Last 24 Hrs  T(C): 36.2 (12 Jan 2022 16:16), Max: 36.7 (12 Jan 2022 08:00)  T(F): 97.1 (12 Jan 2022 16:16), Max: 98.1 (12 Jan 2022 12:00)  HR: 102 (12 Jan 2022 20:00) (95 - 119)  BP: 96/66 (12 Jan 2022 20:00) (96/66 - 142/98)  BP(mean): 75 (12 Jan 2022 20:00) (71 - 106)  ABP: --  ABP(mean): --  RR: 17 (12 Jan 2022 20:00) (14 - 20)  SpO2: 91% (12 Jan 2022 20:00) (91% - 98%)          I&O's Detail    11 Jan 2022 07:01  -  12 Jan 2022 07:00  --------------------------------------------------------  IN:  Total IN: 0 mL    OUT:    Voided (mL): 800 mL  Total OUT: 800 mL    Total NET: -800 mL      12 Jan 2022 07:01  -  12 Jan 2022 20:51  --------------------------------------------------------  IN:    Oral Fluid: 540 mL  Total IN: 540 mL    OUT:    Voided (mL): 600 mL  Total OUT: 600 mL    Total NET: -60 mL            LABS:                        12.4   8.04  )-----------( 234      ( 12 Jan 2022 06:56 )             38.8     01-12    140  |  105  |  60<H>  ----------------------------<  126<H>  4.3   |  25  |  2.51<H>    Ca    8.2<L>      12 Jan 2022 06:56  Mg     1.9     01-12    TPro  6.2  /  Alb  3.1<L>  /  TBili  0.6  /  DBili  x   /  AST  21  /  ALT  31  /  AlkPhos  127<H>  01-11          CAPILLARY BLOOD GLUCOSE      POCT Blood Glucose.: 125 mg/dL (12 Jan 2022 16:48)    PT/INR - ( 11 Jan 2022 13:10 )   PT: 20.8 sec;   INR: 1.77 ratio         PTT - ( 11 Jan 2022 13:10 )  PTT:34.7 sec    CULTURES:      Physical Examination:    General: Alert, interactive, nonfocal    HEENT: Pupils equal, reactive to light.  Symmetric.    PULM: diminished breath sounds at bases, rales     CVS: regular rate and irregular rhythm     ABD: Soft, nondistended, nontender, normoactive bowel sounds, no masses    EXT: mild edema noted     SKIN: Warm and well perfused, no rashes noted.       TIME SPENT: 35 min   Evaluating/treating patient, reviewing data/labs/imaging, discussing case with multidisciplinary team, discussing plan/goals of care with patient/family. Non-inclusive of procedure time.  
Patient is a 89y old  Male who presents with a chief complaint of WERO. CHF (12 Jan 2022 07:12)      INTERVAL HPI/OVERNIGHT EVENTS: Patient seen and examined. NAD. No complaints.    Vital Signs Last 24 Hrs  T(C): 36.7 (12 Jan 2022 08:00), Max: 37.1 (11 Jan 2022 20:25)  T(F): 98 (12 Jan 2022 08:00), Max: 98.7 (11 Jan 2022 20:25)  HR: 115 (12 Jan 2022 09:46) (91 - 122)  BP: 98/63 (12 Jan 2022 09:46) (98/63 - 150/82)  BP(mean): 71 (12 Jan 2022 09:46) (71 - 106)  RR: 15 (12 Jan 2022 09:46) (14 - 20)  SpO2: 93% (12 Jan 2022 09:46) (93% - 98%)    01-12    140  |  105  |  60<H>  ----------------------------<  126<H>  4.3   |  25  |  2.51<H>    Ca    8.2<L>      12 Jan 2022 06:56  Mg     1.9     01-12    TPro  6.2  /  Alb  3.1<L>  /  TBili  0.6  /  DBili  x   /  AST  21  /  ALT  31  /  AlkPhos  127<H>  01-11                          12.4   8.04  )-----------( 234      ( 12 Jan 2022 06:56 )             38.8     PT/INR - ( 11 Jan 2022 13:10 )   PT: 20.8 sec;   INR: 1.77 ratio         PTT - ( 11 Jan 2022 13:10 )  PTT:34.7 sec  CAPILLARY BLOOD GLUCOSE      POCT Blood Glucose.: 127 mg/dL (12 Jan 2022 08:02)  POCT Blood Glucose.: 129 mg/dL (11 Jan 2022 21:58)  POCT Blood Glucose.: 117 mg/dL (11 Jan 2022 17:25)  POCT Blood Glucose.: 138 mg/dL (11 Jan 2022 12:32)              acetaminophen     Tablet .. 650 milliGRAM(s) Oral every 6 hours PRN  aluminum hydroxide/magnesium hydroxide/simethicone Suspension 30 milliLiter(s) Oral every 4 hours PRN  apixaban 2.5 milliGRAM(s) Oral two times a day  atorvastatin 20 milliGRAM(s) Oral at bedtime  dextrose 40% Gel 15 Gram(s) Oral once  dextrose 5%. 1000 milliLiter(s) IV Continuous <Continuous>  dextrose 5%. 1000 milliLiter(s) IV Continuous <Continuous>  dextrose 50% Injectable 25 Gram(s) IV Push once  dextrose 50% Injectable 12.5 Gram(s) IV Push once  dextrose 50% Injectable 25 Gram(s) IV Push once  digoxin     Tablet 125 MICROGram(s) Oral daily  furosemide   Injectable 40 milliGRAM(s) IV Push daily  gabapentin 100 milliGRAM(s) Oral daily  glucagon  Injectable 1 milliGRAM(s) IntraMuscular once  insulin lispro (ADMELOG) corrective regimen sliding scale   SubCutaneous three times a day before meals  insulin lispro (ADMELOG) corrective regimen sliding scale   SubCutaneous at bedtime  lactobacillus acidophilus 1 Tablet(s) Oral three times a day with meals  melatonin 3 milliGRAM(s) Oral at bedtime PRN  metoprolol tartrate 100 milliGRAM(s) Oral two times a day  ondansetron Injectable 4 milliGRAM(s) IV Push every 8 hours PRN  pantoprazole    Tablet 40 milliGRAM(s) Oral before breakfast              REVIEW OF SYSTEMS:  CONSTITUTIONAL: No fever, no weight loss, or no fatigue  NECK: No pain, no stiffness  RESPIRATORY: No cough, no wheezing, no chills, no hemoptysis, No shortness of breath  CARDIOVASCULAR: No chest pain, no palpitations, no dizziness, no leg swelling  GASTROINTESTINAL: No abdominal pain. No nausea, no vomiting, no hematemesis; No diarrhea, no constipation. No melena, no hematochezia.  GENITOURINARY: No dysuria, no frequency, no hematuria, no incontinence  NEUROLOGICAL: No headaches, no loss of strength, no numbness, no tremors  SKIN: No itching, no burning  MUSCULOSKELETAL: No joint pain, no swelling; No muscle, no back, no extremity pain  PSYCHIATRIC: No depression, no mood swings,   HEME/LYMPH: No easy bruising, no bleeding gums  ALLERY AND IMMUNOLOGIC: No hives       Consultant(s) Notes Reviewed:  [X] YES  [ ] NO    PHYSICAL EXAM:  GENERAL: NAD  HEAD:  Atraumatic, Normocephalic  EYES: EOMI, PERRLA, conjunctiva and sclera clear  ENMT: No tonsillar erythema, exudates, or enlargement; Moist mucous membranes  NECK: Supple, No JVD  NERVOUS SYSTEM:  Awake & alert  CHEST/LUNG: Clear to auscultation bilaterally; No rales, rhonchi, wheezing,  HEART: Regular rate and rhythm  ABDOMEN: Soft, Nontender, Nondistended; Bowel sounds present  EXTREMITIES:  No clubbing, cyanosis, or edema  LYMPH: No lymphadenopathy noted  SKIN: No rashes      Advanced care planning discussed with patient/family [X] YES   [ ] NO    Advanced care planning discussed with patient/family. Patient's health status was discussed. All appropriate changes have been made regarding patient's end-of-life care. Advanced care planning forms reviewed/discussed/completed.  20 minutes spent.   
Chief Complaint: Weight gain, shortness of breath    Interval Events: No events overnight.    Review of Systems:  General: No fevers, chills, weight gain  Skin: No rashes, color changes  Cardiovascular: No chest pain, orthopnea  Respiratory: No shortness of breath, cough  Gastrointestinal: No nausea, abdominal pain  Genitourinary: No incontinence, pain with urination  Musculoskeletal: No pain, swelling, decreased range of motion  Neurological: No headache, weakness  Psychiatric: No depression, anxiety  Endocrine: No weight gain, increased thirst  All other systems are comprehensively negative.    Physical Exam:  Vital Signs Last 24 Hrs  T(C): 36.3 (12 Jan 2022 20:00), Max: 36.7 (12 Jan 2022 12:00)  T(F): 97.4 (12 Jan 2022 20:00), Max: 98.1 (12 Jan 2022 12:00)  HR: 103 (13 Jan 2022 08:00) (87 - 123)  BP: 122/83 (13 Jan 2022 08:00) (96/66 - 122/84)  BP(mean): 96 (13 Jan 2022 08:00) (71 - 99)  RR: 19 (13 Jan 2022 08:00) (13 - 19)  SpO2: 97% (13 Jan 2022 08:00) (91% - 98%)  General: NAD  HEENT: MMM  Neck: No JVD, no carotid bruit  Lungs: CTAB  CV: Irregular, nl S1/S2, no M/R/G  Abdomen: S/NT/ND, +BS  Extremities: No LE edema, no cyanosis  Neuro: AAOx3, non-focal  Skin: No rash    Labs:                        12.4   8.04  )-----------( 234      ( 12 Jan 2022 06:56 )             38.8     01-12    140  |  105  |  60<H>  ----------------------------<  126<H>  4.3   |  25  |  2.51<H>    Ca    8.2<L>      12 Jan 2022 06:56  Mg     1.9     01-12    TPro  6.2  /  Alb  3.1<L>  /  TBili  0.6  /  DBili  x   /  AST  21  /  ALT  31  /  AlkPhos  127<H>  01-11        PT/INR - ( 11 Jan 2022 13:10 )   PT: 20.8 sec;   INR: 1.77 ratio         PTT - ( 11 Jan 2022 13:10 )  PTT:34.7 sec    Telemetry: -110
Date/Time Patient Seen:  		  Referring MD:   Data Reviewed	       Patient is a 89y old  Male who presents with a chief complaint of WERO. CHF (12 Jan 2022 20:51)      Subjective/HPI     PAST MEDICAL & SURGICAL HISTORY:  HLD (hyperlipidemia)    HTN (hypertension)    Carotid artery disorder  Left carotid congenital anomaly    Osteoarthritis    Melanoma in situ  left thigh    Type 2 diabetes mellitus without complication    History of basal cell carcinoma (BCC)    Basal cell cancer  Moh&#x27;s procedure 4/08/2014    S/P lumbar laminectomy    Retinal detachment  repair - right eye    Dupuytrens contracture  release - both hands          Medication list         MEDICATIONS  (STANDING):  apixaban 2.5 milliGRAM(s) Oral two times a day  atorvastatin 20 milliGRAM(s) Oral at bedtime  dextrose 40% Gel 15 Gram(s) Oral once  dextrose 5%. 1000 milliLiter(s) (50 mL/Hr) IV Continuous <Continuous>  dextrose 5%. 1000 milliLiter(s) (100 mL/Hr) IV Continuous <Continuous>  dextrose 50% Injectable 25 Gram(s) IV Push once  dextrose 50% Injectable 12.5 Gram(s) IV Push once  dextrose 50% Injectable 25 Gram(s) IV Push once  digoxin     Tablet 125 MICROGram(s) Oral daily  diltiazem    milliGRAM(s) Oral daily  furosemide   Injectable 40 milliGRAM(s) IV Push daily  gabapentin 100 milliGRAM(s) Oral daily  glucagon  Injectable 1 milliGRAM(s) IntraMuscular once  insulin lispro (ADMELOG) corrective regimen sliding scale   SubCutaneous three times a day before meals  insulin lispro (ADMELOG) corrective regimen sliding scale   SubCutaneous at bedtime  lactobacillus acidophilus 1 Tablet(s) Oral three times a day with meals  metoprolol tartrate 100 milliGRAM(s) Oral two times a day  pantoprazole    Tablet 40 milliGRAM(s) Oral before breakfast    MEDICATIONS  (PRN):  acetaminophen     Tablet .. 650 milliGRAM(s) Oral every 6 hours PRN Temp greater or equal to 38C (100.4F), Mild Pain (1 - 3)  aluminum hydroxide/magnesium hydroxide/simethicone Suspension 30 milliLiter(s) Oral every 4 hours PRN Dyspepsia  melatonin 3 milliGRAM(s) Oral at bedtime PRN Insomnia  ondansetron Injectable 4 milliGRAM(s) IV Push every 8 hours PRN Nausea and/or Vomiting         Vitals log        ICU Vital Signs Last 24 Hrs  T(C): 36.3 (12 Jan 2022 20:00), Max: 36.7 (12 Jan 2022 08:00)  T(F): 97.4 (12 Jan 2022 20:00), Max: 98.1 (12 Jan 2022 12:00)  HR: 101 (13 Jan 2022 06:00) (87 - 123)  BP: 115/86 (13 Jan 2022 06:00) (96/66 - 142/98)  BP(mean): 97 (13 Jan 2022 06:00) (71 - 106)  ABP: --  ABP(mean): --  RR: 13 (13 Jan 2022 06:00) (13 - 19)  SpO2: 94% (13 Jan 2022 06:00) (91% - 98%)           Input and Output:  I&O's Detail    12 Jan 2022 07:01  -  13 Jan 2022 07:00  --------------------------------------------------------  IN:    Oral Fluid: 540 mL  Total IN: 540 mL    OUT:    Voided (mL): 600 mL  Total OUT: 600 mL    Total NET: -60 mL          Lab Data                        12.4   8.04  )-----------( 234      ( 12 Jan 2022 06:56 )             38.8     01-12    140  |  105  |  60<H>  ----------------------------<  126<H>  4.3   |  25  |  2.51<H>    Ca    8.2<L>      12 Jan 2022 06:56  Mg     1.9     01-12    TPro  6.2  /  Alb  3.1<L>  /  TBili  0.6  /  DBili  x   /  AST  21  /  ALT  31  /  AlkPhos  127<H>  01-11            Review of Systems	      Objective     Physical Examination    heart 1s2  lung dec BS  abd soft  head nc      Pertinent Lab findings & Imaging      Rosa Elena:  NO   Adequate UO     I&O's Detail    12 Jan 2022 07:01  -  13 Jan 2022 07:00  --------------------------------------------------------  IN:    Oral Fluid: 540 mL  Total IN: 540 mL    OUT:    Voided (mL): 600 mL  Total OUT: 600 mL    Total NET: -60 mL               Discussed with:     Cultures:	        Radiology                            
Patient is a 89y old  Male who presents with a chief complaint of WERO. CHF (13 Jan 2022 08:52)      INTERVAL HPI/OVERNIGHT EVENTS: Patient seen and examined. NAD. No complaints.    Vital Signs Last 24 Hrs  T(C): 36.3 (12 Jan 2022 20:00), Max: 36.7 (12 Jan 2022 12:00)  T(F): 97.4 (12 Jan 2022 20:00), Max: 98.1 (12 Jan 2022 12:00)  HR: 112 (13 Jan 2022 10:05) (87 - 123)  BP: 102/69 (13 Jan 2022 10:05) (96/66 - 122/84)  BP(mean): 96 (13 Jan 2022 08:00) (75 - 99)  RR: 19 (13 Jan 2022 08:00) (13 - 19)  SpO2: 94% (13 Jan 2022 10:05) (91% - 98%)    01-13    139  |  105  |  56<H>  ----------------------------<  133<H>  4.1   |  26  |  2.25<H>    Ca    7.8<L>      13 Jan 2022 06:55  Phos  3.7     01-13  Mg     2.1     01-13    TPro  6.2  /  Alb  3.1<L>  /  TBili  0.6  /  DBili  x   /  AST  21  /  ALT  31  /  AlkPhos  127<H>  01-11                          12.4   8.04  )-----------( 234      ( 12 Jan 2022 06:56 )             38.8     PT/INR - ( 11 Jan 2022 13:10 )   PT: 20.8 sec;   INR: 1.77 ratio         PTT - ( 11 Jan 2022 13:10 )  PTT:34.7 sec  CAPILLARY BLOOD GLUCOSE      POCT Blood Glucose.: 215 mg/dL (13 Jan 2022 11:06)  POCT Blood Glucose.: 129 mg/dL (13 Jan 2022 08:00)  POCT Blood Glucose.: 155 mg/dL (12 Jan 2022 21:14)  POCT Blood Glucose.: 125 mg/dL (12 Jan 2022 16:48)              acetaminophen     Tablet .. 650 milliGRAM(s) Oral every 6 hours PRN  aluminum hydroxide/magnesium hydroxide/simethicone Suspension 30 milliLiter(s) Oral every 4 hours PRN  apixaban 2.5 milliGRAM(s) Oral two times a day  atorvastatin 20 milliGRAM(s) Oral at bedtime  dextrose 40% Gel 15 Gram(s) Oral once  dextrose 5%. 1000 milliLiter(s) IV Continuous <Continuous>  dextrose 5%. 1000 milliLiter(s) IV Continuous <Continuous>  dextrose 50% Injectable 25 Gram(s) IV Push once  dextrose 50% Injectable 12.5 Gram(s) IV Push once  dextrose 50% Injectable 25 Gram(s) IV Push once  digoxin     Tablet 125 MICROGram(s) Oral daily  diltiazem    milliGRAM(s) Oral daily  gabapentin 100 milliGRAM(s) Oral daily  glucagon  Injectable 1 milliGRAM(s) IntraMuscular once  insulin lispro (ADMELOG) corrective regimen sliding scale   SubCutaneous three times a day before meals  insulin lispro (ADMELOG) corrective regimen sliding scale   SubCutaneous at bedtime  lactobacillus acidophilus 1 Tablet(s) Oral three times a day with meals  melatonin 3 milliGRAM(s) Oral at bedtime PRN  metoprolol tartrate 100 milliGRAM(s) Oral two times a day  ondansetron Injectable 4 milliGRAM(s) IV Push every 8 hours PRN  pantoprazole    Tablet 40 milliGRAM(s) Oral before breakfast              REVIEW OF SYSTEMS:  CONSTITUTIONAL: No fever, no weight loss, or no fatigue  NECK: No pain, no stiffness  RESPIRATORY: No cough, no wheezing, no chills, no hemoptysis, No shortness of breath  CARDIOVASCULAR: No chest pain, no palpitations, no dizziness, no leg swelling  GASTROINTESTINAL: No abdominal pain. No nausea, no vomiting, no hematemesis; No diarrhea, no constipation. No melena, no hematochezia.  GENITOURINARY: No dysuria, no frequency, no hematuria, no incontinence  NEUROLOGICAL: No headaches, no loss of strength, no numbness, no tremors  SKIN: No itching, no burning  MUSCULOSKELETAL: No joint pain, no swelling; No muscle, no back, no extremity pain  PSYCHIATRIC: No depression, no mood swings,   HEME/LYMPH: No easy bruising, no bleeding gums  ALLERY AND IMMUNOLOGIC: No hives       Consultant(s) Notes Reviewed:  [X] YES  [ ] NO    PHYSICAL EXAM:  GENERAL: NAD  HEAD:  Atraumatic, Normocephalic  EYES: EOMI, PERRLA, conjunctiva and sclera clear  ENMT: No tonsillar erythema, exudates, or enlargement; Moist mucous membranes  NECK: Supple, No JVD  NERVOUS SYSTEM:  Awake & alert  CHEST/LUNG: Clear to auscultation bilaterally; No rales, rhonchi, wheezing,  HEART: Regular rate and rhythm  ABDOMEN: Soft, Nontender, Nondistended; Bowel sounds present  EXTREMITIES:  No clubbing, cyanosis, or edema  LYMPH: No lymphadenopathy noted  SKIN: No rashes      Advanced care planning discussed with patient/family [X] YES   [ ] NO    Advanced care planning discussed with patient/family. Patient's health status was discussed. All appropriate changes have been made regarding patient's end-of-life care. Advanced care planning forms reviewed/discussed/completed.  20 minutes spent.

## 2022-01-13 NOTE — DISCHARGE NOTE PROVIDER - PROVIDER TOKENS
PROVIDER:[TOKEN:[[id number]],FOLLOWUP:[1 week],ESTABLISHEDPATIENT:[T]],PROVIDER:[TOKEN:[[id number]],FOLLOWUP:[1 week],ESTABLISHEDPATIENT:[T]]

## 2022-01-13 NOTE — DISCHARGE NOTE NURSING/CASE MANAGEMENT/SOCIAL WORK - NSSCNAMETXT_GEN_ALL_CORE
[facility] (formerly [facility])   [address] [place] [address]

## 2022-01-13 NOTE — DISCHARGE NOTE PROVIDER - HOSPITAL COURSE
89 y/o M with PMH of HTN, Type 2 DM, Dyslipidemia, PAD, CKD stage 3, Melanoma in situ s/p excision, Diverticulitis, Retinal Detachment s/p repair, OA, chronic systolic heart failure and Obesity s/p diagnosed with Afib, L deep femoral vein DVT on eliquis and mild CHF on 12/19/2021, admitted at [facility] and started on eliquis, digoxin presents with c/o generalized weakness, weight gain and shortness of breath the past few days. PCP, Dr. Richard had d/c'ed digoxin and started on lasix which he took for 2 days last week and then discontinued by cardiologist, Dr. Iglesias due to no issues with respiration and frequent urination. States that he has been taking lasix 20mg daily the past 2 days. States that he has been short of breath since lasix was stopped and was had 4lb weight gain and son stated pt was tachycardic in 130s. Pt admits to dyspnea with exertion. Denies chest pain, fever, cough, calf pain. Family spoke with pcp today who advised pt to come in to ED for eval.    Patient admitted with acute on chronic HF likely secondary to fluid overload from WERO  Patient was diuresed and meds adjusted  Improved rapidly  Going home    >35 minutes spent on discharge

## 2022-01-13 NOTE — DISCHARGE NOTE NURSING/CASE MANAGEMENT/SOCIAL WORK - NSDCPEFALRISK_GEN_ALL_CORE
For information on Fall & Injury Prevention, visit: [email or web address] OR  [email or web address] OR  https://www.cdc.gov/steadi/patient.html

## 2022-01-13 NOTE — DISCHARGE NOTE NURSING/CASE MANAGEMENT/SOCIAL WORK - PATIENT PORTAL LINK FT
You can access the FollowMyHealth Patient Portal offered by [facility] by registering at the following website: [email or web address]. By joining [employer]’s FollowMyHealth portal, you will also be able to view your health information using other applications (apps) compatible with our system.

## 2022-01-13 NOTE — DISCHARGE NOTE PROVIDER - NSDCCPCAREPLAN_GEN_ALL_CORE_FT
PRINCIPAL DISCHARGE DIAGNOSIS  Diagnosis: Rapid atrial fibrillation  Assessment and Plan of Treatment: Continue current medications  Follow-up with your primary care doctor and cardiologist within 1 week.      SECONDARY DISCHARGE DIAGNOSES  Diagnosis: CHF exacerbation  Assessment and Plan of Treatment: Continue current medications  Follow-up with your primary care doctor and cardiologist within 1 week.

## 2022-01-13 NOTE — DISCHARGE NOTE PROVIDER - CARE PROVIDER_API CALL
Frank Richard)  Family Medicine  [address], [place] [id number]  Phone: [phone]  Fax: [phone]  Established Patient  Follow Up Time: 1 week    Beny Iglesias)  Cardiovascular Disease; Internal Medicine  [address] [facility], [address]  [place] [address]  Phone: [phone]  Fax: [phone]  Established Patient  Follow Up Time: 1 week

## 2022-01-13 NOTE — DISCHARGE NOTE NURSING/CASE MANAGEMENT/SOCIAL WORK - NSDCVIVACCINE_GEN_ALL_CORE_FT
Tdap; 27-Nov-2020 07:55; Bette Pizano (ALLIE); [employer]; [id number] (Exp. Date: 31-Jul-2022); IntraMuscular; Deltoid Left.; 0.5 milliLiter(s); VIS (VIS Published: 09-May-2013, VIS Presented: 27-Nov-2020);

## 2022-01-17 ENCOUNTER — EMERGENCY (EMERGENCY)
Facility: HOSPITAL | Age: 87
LOS: 1 days | Discharge: HOME | End: 2022-01-17
Attending: EMERGENCY MEDICINE | Admitting: EMERGENCY MEDICINE
Payer: MEDICARE

## 2022-01-17 VITALS
DIASTOLIC BLOOD PRESSURE: 62 MMHG | RESPIRATION RATE: 18 BRPM | HEIGHT: 68 IN | OXYGEN SATURATION: 94 % | WEIGHT: 223.11 LBS | HEART RATE: 115 BPM | SYSTOLIC BLOOD PRESSURE: 104 MMHG | TEMPERATURE: 98 F

## 2022-01-17 DIAGNOSIS — H33.20 SEROUS RETINAL DETACHMENT, UNSPECIFIED EYE: Chronic | ICD-10-CM

## 2022-01-17 DIAGNOSIS — I48.91 UNSPECIFIED ATRIAL FIBRILLATION: ICD-10-CM

## 2022-01-17 DIAGNOSIS — M72.0 PALMAR FASCIAL FIBROMATOSIS [DUPUYTREN]: Chronic | ICD-10-CM

## 2022-01-17 DIAGNOSIS — C44.91 BASAL CELL CARCINOMA OF SKIN, UNSPECIFIED: Chronic | ICD-10-CM

## 2022-01-17 DIAGNOSIS — Z98.89 OTHER SPECIFIED POSTPROCEDURAL STATES: Chronic | ICD-10-CM

## 2022-01-17 LAB
ALBUMIN SERPL ELPH-MCNC: 3.2 G/DL — LOW (ref 3.3–5)
ALP SERPL-CCNC: 136 U/L — HIGH (ref 30–120)
ALT FLD-CCNC: 22 U/L DA — SIGNIFICANT CHANGE UP (ref 10–60)
ANION GAP SERPL CALC-SCNC: 11 MMOL/L — SIGNIFICANT CHANGE UP (ref 5–17)
AST SERPL-CCNC: 20 U/L — SIGNIFICANT CHANGE UP (ref 10–40)
BASOPHILS # BLD AUTO: 0.03 K/UL — SIGNIFICANT CHANGE UP (ref 0–0.2)
BASOPHILS NFR BLD AUTO: 0.2 % — SIGNIFICANT CHANGE UP (ref 0–2)
BILIRUB SERPL-MCNC: 0.6 MG/DL — SIGNIFICANT CHANGE UP (ref 0.2–1.2)
BUN SERPL-MCNC: 50 MG/DL — HIGH (ref 7–23)
CALCIUM SERPL-MCNC: 8.6 MG/DL — SIGNIFICANT CHANGE UP (ref 8.4–10.5)
CHLORIDE SERPL-SCNC: 103 MMOL/L — SIGNIFICANT CHANGE UP (ref 96–108)
CO2 SERPL-SCNC: 29 MMOL/L — SIGNIFICANT CHANGE UP (ref 22–31)
CREAT SERPL-MCNC: 1.99 MG/DL — HIGH (ref 0.5–1.3)
DIGOXIN SERPL-MCNC: 1.6 NG/ML — SIGNIFICANT CHANGE UP (ref 0.8–2)
EOSINOPHIL # BLD AUTO: 0.23 K/UL — SIGNIFICANT CHANGE UP (ref 0–0.5)
EOSINOPHIL NFR BLD AUTO: 1.8 % — SIGNIFICANT CHANGE UP (ref 0–6)
GLUCOSE SERPL-MCNC: 158 MG/DL — HIGH (ref 70–99)
HCT VFR BLD CALC: 45.3 % — SIGNIFICANT CHANGE UP (ref 39–50)
HGB BLD-MCNC: 14.8 G/DL — SIGNIFICANT CHANGE UP (ref 13–17)
IMM GRANULOCYTES NFR BLD AUTO: 0.6 % — SIGNIFICANT CHANGE UP (ref 0–1.5)
LIDOCAIN IGE QN: 146 U/L — SIGNIFICANT CHANGE UP (ref 73–393)
LYMPHOCYTES # BLD AUTO: 1.44 K/UL — SIGNIFICANT CHANGE UP (ref 1–3.3)
LYMPHOCYTES # BLD AUTO: 11.1 % — LOW (ref 13–44)
MAGNESIUM SERPL-MCNC: 1.6 MG/DL — SIGNIFICANT CHANGE UP (ref 1.6–2.6)
MCHC RBC-ENTMCNC: 30.5 PG — SIGNIFICANT CHANGE UP (ref 27–34)
MCHC RBC-ENTMCNC: 32.7 GM/DL — SIGNIFICANT CHANGE UP (ref 32–36)
MCV RBC AUTO: 93.2 FL — SIGNIFICANT CHANGE UP (ref 80–100)
MONOCYTES # BLD AUTO: 0.92 K/UL — HIGH (ref 0–0.9)
MONOCYTES NFR BLD AUTO: 7.1 % — SIGNIFICANT CHANGE UP (ref 2–14)
NEUTROPHILS # BLD AUTO: 10.26 K/UL — HIGH (ref 1.8–7.4)
NEUTROPHILS NFR BLD AUTO: 79.2 % — HIGH (ref 43–77)
NRBC # BLD: 0 /100 WBCS — SIGNIFICANT CHANGE UP (ref 0–0)
NT-PROBNP SERPL-SCNC: HIGH PG/ML (ref 0–450)
PHOSPHATE SERPL-MCNC: 2.9 MG/DL — SIGNIFICANT CHANGE UP (ref 2.5–4.5)
PLATELET # BLD AUTO: 257 K/UL — SIGNIFICANT CHANGE UP (ref 150–400)
POTASSIUM SERPL-MCNC: 4.3 MMOL/L — SIGNIFICANT CHANGE UP (ref 3.5–5.3)
POTASSIUM SERPL-SCNC: 4.3 MMOL/L — SIGNIFICANT CHANGE UP (ref 3.5–5.3)
PROT SERPL-MCNC: 6.9 G/DL — SIGNIFICANT CHANGE UP (ref 6–8.3)
RBC # BLD: 4.86 M/UL — SIGNIFICANT CHANGE UP (ref 4.2–5.8)
RBC # FLD: 14.7 % — HIGH (ref 10.3–14.5)
SARS-COV-2 RNA SPEC QL NAA+PROBE: SIGNIFICANT CHANGE UP
SODIUM SERPL-SCNC: 143 MMOL/L — SIGNIFICANT CHANGE UP (ref 135–145)
TROPONIN I, HIGH SENSITIVITY RESULT: 40.1 NG/L — SIGNIFICANT CHANGE UP
WBC # BLD: 12.96 K/UL — HIGH (ref 3.8–10.5)
WBC # FLD AUTO: 12.96 K/UL — HIGH (ref 3.8–10.5)

## 2022-01-17 PROCEDURE — 71045 X-RAY EXAM CHEST 1 VIEW: CPT | Mod: 26

## 2022-01-17 PROCEDURE — 93010 ELECTROCARDIOGRAM REPORT: CPT

## 2022-01-17 PROCEDURE — 99285 EMERGENCY DEPT VISIT HI MDM: CPT

## 2022-01-17 PROCEDURE — 74019 RADEX ABDOMEN 2 VIEWS: CPT | Mod: 26

## 2022-01-17 RX ORDER — METOPROLOL TARTRATE 50 MG
100 TABLET ORAL ONCE
Refills: 0 | Status: COMPLETED | OUTPATIENT
Start: 2022-01-17 | End: 2022-01-17

## 2022-01-17 RX ORDER — SODIUM CHLORIDE 9 MG/ML
500 INJECTION INTRAMUSCULAR; INTRAVENOUS; SUBCUTANEOUS ONCE
Refills: 0 | Status: COMPLETED | OUTPATIENT
Start: 2022-01-17 | End: 2022-01-17

## 2022-01-17 RX ORDER — ONDANSETRON 8 MG/1
4 TABLET, FILM COATED ORAL ONCE
Refills: 0 | Status: COMPLETED | OUTPATIENT
Start: 2022-01-17 | End: 2022-01-17

## 2022-01-17 RX ADMIN — Medication 100 MILLIGRAM(S): at 23:49

## 2022-01-17 RX ADMIN — ONDANSETRON 4 MILLIGRAM(S): 8 TABLET, FILM COATED ORAL at 22:08

## 2022-01-17 RX ADMIN — SODIUM CHLORIDE 250 MILLILITER(S): 9 INJECTION INTRAMUSCULAR; INTRAVENOUS; SUBCUTANEOUS at 22:05

## 2022-01-17 RX ADMIN — SODIUM CHLORIDE 500 MILLILITER(S): 9 INJECTION INTRAMUSCULAR; INTRAVENOUS; SUBCUTANEOUS at 23:54

## 2022-01-17 NOTE — ED PROVIDER NOTE - NSFOLLOWUPINSTRUCTIONS_ED_ALL_ED_FT
1.  Take Zofran as needed for nausea.      [email or web address]® CareNotes®     :  [facility]  	                       ACUTE NAUSEA AND VOMITING - AfterCare(R) Instructions(ER/ED)           Acute Nausea and Vomiting    WHAT YOU NEED TO KNOW:    Acute nausea and vomiting start suddenly, worsen quickly, and last a short time.    DISCHARGE INSTRUCTIONS:    Return to the emergency department if:   •You see blood in your vomit or your bowel movements.      •You have sudden, severe pain in your chest and upper abdomen after hard vomiting or retching.      •You have swelling in your neck and chest.       •You are dizzy, cold, and thirsty and your eyes and mouth are dry.      •You are urinating very little or not at all.      •You have muscle weakness, leg cramps, and trouble breathing.       •Your heart is beating much faster than normal.       •You continue to vomit for more than 48 hours.       Contact your healthcare provider if:   •You have frequent dry heaves (vomiting but nothing comes out).      •Your nausea and vomiting does not get better or go away after you use medicine.      •You have questions or concerns about your condition or treatment.      Medicines: You may need any of the following:   •Medicines may be given to calm your stomach and stop your vomiting. You may also need medicines to help you feel more relaxed or to stop nausea and vomiting caused by motion sickness.      •Gastrointestinal stimulants are used to help empty your stomach and bowels. This may help decrease nausea and vomiting.      •Take your medicine as directed. Contact your healthcare provider if you think your medicine is not helping or if you have side effects. Tell him or her if you are allergic to any medicine. Keep a list of the medicines, vitamins, and herbs you take. Include the amounts, and when and why you take them. Bring the list or the pill bottles to follow-up visits. Carry your medicine list with you in case of an emergency.      Prevent or manage acute nausea and vomiting:   •Do not drink alcohol. Alcohol may upset or irritate your stomach. Too much alcohol can also cause acute nausea and vomiting.      •Control stress. Headaches due to stress may cause nausea and vomiting. Find ways to relax and manage your stress. Get more rest and sleep.      •Drink more liquids as directed. Vomiting can lead to dehydration. It is important to drink more liquids to help replace lost body fluids. Ask your healthcare provider how much liquid to drink each day and which liquids are best for you. Your provider may recommend that you drink an oral rehydration solution (ORS). ORS contains water, salts, and sugar that are needed to replace the lost body fluids. Ask what kind of ORS to use, how much to drink, and where to get it.      •Eat smaller meals, more often. Eat small amounts of food every 2 to 3 hours, even if you are not hungry. Food in your stomach may decrease your nausea.      •Talk to your healthcare provider before you take over-the-counter (OTC) medicines. These medicines can cause serious problems if you use certain other medicines, or you have a medical condition. You may have problems if you use too much or use them for longer than the label says. Follow directions on the label carefully.       Follow up with your healthcare provider as directed: Write down your questions so you remember to ask them during your follow-up visits.       © Copyright [employer] 2022           back to top                          © Copyright [employer] 2022

## 2022-01-17 NOTE — ED PROVIDER NOTE - OBJECTIVE STATEMENT
89 y.o. M BIBEMS from home for vomiting, pt was admitted recently to hospital (DC 4d ago) for CHF exacerbation, also last month dxd with new onset afib and dvt - is on eliquis, digoxin, lasix - today     pt's son spoke with his PCP Dr. Frank Richard, who advised calling an ambulance to have pt seen in the ED 89 y.o. M BIBEMS from home for vomiting, pt was admitted recently to hospital (DC 4d ago) for CHF exacerbation, also last month dxd with new onset afib and dvt - is on eliquis, digoxin, lasix - today pt states he was feeling baseline, had Greek food for lunch, then did not have appetite for dinner, then began vomiting, had normal bm today, states no diarrhea, denies fever/chills, denies abd pain, denies cp or sob, has h/o diverticulosis/itis - does not feel like that, denies h/o abd surgery in the past    pt's son spoke with his PCP Dr. Frank Richard, who advised calling an ambulance to have pt seen in the ED

## 2022-01-17 NOTE — ED ADULT NURSE NOTE - NS TRANSFER PATIENT BELONGINGS
yellow metal necklace with round charm, grey metal necklace/Wrist Watch/Cell Phone/PDA (specify)/Jewelry/Money (specify)/Clothing yellow metal necklace with round charm, grey metal necklace with star of Sabino charm/Wrist Watch/Cell Phone/PDA (specify)/Jewelry/Money (specify)/Clothing

## 2022-01-17 NOTE — ED PROVIDER NOTE - PRINCIPAL DIAGNOSIS
Outcome: Left Message to call back so I can schedule PT for her AWV    Please transfer to Patient Outreach Team at [phone] when patient returns call.    [facility] Verified: yes    Attempt # 2      Vomiting

## 2022-01-17 NOTE — ED PROVIDER NOTE - PATIENT PORTAL LINK FT
You can access the FollowMyHealth Patient Portal offered by [facility] by registering at the following website: [email or web address]. By joining [facility]’s FollowMyHealth portal, you will also be able to view your health information using other applications (apps) compatible with our system.

## 2022-01-17 NOTE — ED ADULT NURSE REASSESSMENT NOTE - NSIMPLEMENTINTERV_GEN_ALL_ED
Implemented All Fall Risk Interventions:  [place] to call system. Call bell, personal items and telephone within reach. Instruct patient to call for assistance. Room bathroom lighting operational. Non-slip footwear when patient is off stretcher. Physically safe environment: no spills, clutter or unnecessary equipment. Stretcher in lowest position, wheels locked, appropriate side rails in place. Provide visual cue, wrist band, yellow gown, etc. Monitor gait and stability. Monitor for mental status changes and reorient to person, place, and time. Review medications for side effects contributing to fall risk. Reinforce activity limits and safety measures with patient and family.

## 2022-01-17 NOTE — ED ADULT NURSE NOTE - OBJECTIVE STATEMENT
Sent by PCP Dr Richard because he has vomited 3 times today. No other complaints. Denies pain, denies fever.

## 2022-01-17 NOTE — ED PROVIDER NOTE - NSCAREINITIATED _GEN_ER
Rosana Selby(Attending) Render Risk Assessment In Note?: no Comment: Charles Mild DN, R thigh, 8/2015 Detail Level: Zone Detail Level: Simple Comment: Father

## 2022-01-17 NOTE — ED PROVIDER NOTE - PROGRESS NOTE DETAILS
pt began vomiting about 1 hr after night meds (including metoprolol tartrate 100mg), likely vomited them as he is still vomiting lunch foods now - will redose the metoprolol now as is afib rvr 130s, bp 120s systolic pt's nausea improved, feels tired, was unable to get upright axr as radiology tech felt pt was too tired and may not be stable for upright, will reassess need for upright vs other imaging at later time; spoke with Dr. Richard, discussed results, will observe pt in ED overnight and reassess in the morning, dispo to be decided based on progress overnight spoke with pt's son Sabino, understands pt will be observed in ED overnight, son concerned that pt's cardiologist, Dr. Iglesias, should be called ASAP, advised that I will d/w Dr. Iglesias in the morning afib rvr mostly 120s-130s, occ drops below 100, was given nighttime dose metoprolol, will give dose iv diltiazem while metoprolol metabolizing, pt reports no further nausea, no abdominal pain, no cp or sob nursing aide reports pt had small loose stool no vomiting since zofran last night, pt without complaint at this time, pt taking PO drink, will observe, remains tachy 110s-120s afib, will give another 250mL bolus NS, plan for am rate controlling meds if tolerates PO, otherwise will require admission, if able to take am meds and continues to be in rvr may also require admission, will d/w cards in am Dr. Iglesias at bedside.  Dr. Iglesias refers pt Kate

## 2022-01-17 NOTE — ED PROVIDER NOTE - CARE PROVIDER_API CALL
Dylan Posadas)  Cardiac Electrophysiology; Cardiology  [address] [place] [address]  Phone: [phone]  Fax: [phone]  Follow Up Time: 1-3 Days

## 2022-01-17 NOTE — ED PROVIDER NOTE - CLINICAL SUMMARY MEDICAL DECISION MAKING FREE TEXT BOX
89 y.o. M with few hr vomiting, no abd pain, abd benign on exam, recent admission for chf, will check cell counts, elytes, ekg, cxr/axr, give antiemetic, reassess

## 2022-01-18 VITALS
HEART RATE: 120 BPM | SYSTOLIC BLOOD PRESSURE: 130 MMHG | RESPIRATION RATE: 20 BRPM | DIASTOLIC BLOOD PRESSURE: 61 MMHG | TEMPERATURE: 98 F | OXYGEN SATURATION: 98 %

## 2022-01-18 PROCEDURE — 93005 ELECTROCARDIOGRAM TRACING: CPT

## 2022-01-18 PROCEDURE — 74019 RADEX ABDOMEN 2 VIEWS: CPT

## 2022-01-18 PROCEDURE — 83880 ASSAY OF NATRIURETIC PEPTIDE: CPT

## 2022-01-18 PROCEDURE — 71045 X-RAY EXAM CHEST 1 VIEW: CPT

## 2022-01-18 PROCEDURE — 83735 ASSAY OF MAGNESIUM: CPT

## 2022-01-18 PROCEDURE — 80053 COMPREHEN METABOLIC PANEL: CPT

## 2022-01-18 PROCEDURE — 85025 COMPLETE CBC W/AUTO DIFF WBC: CPT

## 2022-01-18 PROCEDURE — 99284 EMERGENCY DEPT VISIT MOD MDM: CPT | Mod: 25

## 2022-01-18 PROCEDURE — 87635 SARS-COV-2 COVID-19 AMP PRB: CPT

## 2022-01-18 PROCEDURE — 96361 HYDRATE IV INFUSION ADD-ON: CPT

## 2022-01-18 PROCEDURE — 84484 ASSAY OF TROPONIN QUANT: CPT

## 2022-01-18 PROCEDURE — 96374 THER/PROPH/DIAG INJ IV PUSH: CPT

## 2022-01-18 PROCEDURE — 80162 ASSAY OF DIGOXIN TOTAL: CPT

## 2022-01-18 PROCEDURE — 36415 COLL VENOUS BLD VENIPUNCTURE: CPT

## 2022-01-18 PROCEDURE — 96376 TX/PRO/DX INJ SAME DRUG ADON: CPT

## 2022-01-18 PROCEDURE — 96375 TX/PRO/DX INJ NEW DRUG ADDON: CPT

## 2022-01-18 PROCEDURE — 84100 ASSAY OF PHOSPHORUS: CPT

## 2022-01-18 PROCEDURE — 83690 ASSAY OF LIPASE: CPT

## 2022-01-18 RX ORDER — ONDANSETRON 8 MG/1
1 TABLET, FILM COATED ORAL
Qty: 12 | Refills: 0
Start: 2022-01-18 | End: 2022-01-20

## 2022-01-18 RX ORDER — DILTIAZEM HCL 120 MG
30 CAPSULE, EXT RELEASE 24 HR ORAL ONCE
Refills: 0 | Status: COMPLETED | OUTPATIENT
Start: 2022-01-18 | End: 2022-01-18

## 2022-01-18 RX ORDER — APIXABAN 2.5 MG/1
2.5 TABLET, FILM COATED ORAL ONCE
Refills: 0 | Status: COMPLETED | OUTPATIENT
Start: 2022-01-18 | End: 2022-01-18

## 2022-01-18 RX ORDER — DILTIAZEM HCL 120 MG
240 CAPSULE, EXT RELEASE 24 HR ORAL ONCE
Refills: 0 | Status: COMPLETED | OUTPATIENT
Start: 2022-01-18 | End: 2022-01-18

## 2022-01-18 RX ORDER — DILTIAZEM HCL 120 MG
10 CAPSULE, EXT RELEASE 24 HR ORAL ONCE
Refills: 0 | Status: COMPLETED | OUTPATIENT
Start: 2022-01-18 | End: 2022-01-18

## 2022-01-18 RX ORDER — SODIUM CHLORIDE 9 MG/ML
250 INJECTION INTRAMUSCULAR; INTRAVENOUS; SUBCUTANEOUS ONCE
Refills: 0 | Status: COMPLETED | OUTPATIENT
Start: 2022-01-18 | End: 2022-01-18

## 2022-01-18 RX ORDER — DILTIAZEM HCL 120 MG
15 CAPSULE, EXT RELEASE 24 HR ORAL ONCE
Refills: 0 | Status: COMPLETED | OUTPATIENT
Start: 2022-01-18 | End: 2022-01-18

## 2022-01-18 RX ORDER — DIGOXIN 250 MCG
125 TABLET ORAL ONCE
Refills: 0 | Status: COMPLETED | OUTPATIENT
Start: 2022-01-18 | End: 2022-01-18

## 2022-01-18 RX ORDER — METOPROLOL TARTRATE 50 MG
100 TABLET ORAL ONCE
Refills: 0 | Status: COMPLETED | OUTPATIENT
Start: 2022-01-18 | End: 2022-01-18

## 2022-01-18 RX ADMIN — SODIUM CHLORIDE 250 MILLILITER(S): 9 INJECTION INTRAMUSCULAR; INTRAVENOUS; SUBCUTANEOUS at 05:43

## 2022-01-18 RX ADMIN — Medication 10 MILLIGRAM(S): at 01:41

## 2022-01-18 RX ADMIN — SODIUM CHLORIDE 250 MILLILITER(S): 9 INJECTION INTRAMUSCULAR; INTRAVENOUS; SUBCUTANEOUS at 04:45

## 2022-01-18 RX ADMIN — Medication 240 MILLIGRAM(S): at 05:25

## 2022-01-18 RX ADMIN — Medication 15 MILLIGRAM(S): at 02:48

## 2022-01-18 RX ADMIN — Medication 30 MILLIGRAM(S): at 07:40

## 2022-01-18 RX ADMIN — SODIUM CHLORIDE 250 MILLILITER(S): 9 INJECTION INTRAMUSCULAR; INTRAVENOUS; SUBCUTANEOUS at 07:40

## 2022-01-18 RX ADMIN — Medication 125 MICROGRAM(S): at 05:26

## 2022-01-18 RX ADMIN — SODIUM CHLORIDE 250 MILLILITER(S): 9 INJECTION INTRAMUSCULAR; INTRAVENOUS; SUBCUTANEOUS at 08:40

## 2022-01-18 RX ADMIN — Medication 100 MILLIGRAM(S): at 05:25

## 2022-01-18 RX ADMIN — APIXABAN 2.5 MILLIGRAM(S): 2.5 TABLET, FILM COATED ORAL at 05:25

## 2022-01-18 NOTE — CONSULT NOTE ADULT - SUBJECTIVE AND OBJECTIVE BOX
History of Present Illness: The patient is a 89 year old male with a history of HTN, HL, DM, CKD, carotid stenosis, DVT, atrial fibrillation, chronic systolic heart failure who presented with nausea and vomiting. He denies abdominal pain, chest pain, shortness of breath, diarrhea. No changes in leg swelling. He was unable to tolerate oral medications yesterday. Today he states he feels improved and took morning medications.    Past Medical/Surgical History:  HTN, HL, DM, CKD, carotid stenosis, DVT, atrial fibrillation, chronic systolic heart failure    Medications:  Home Medications:  atorvastatin 20 mg oral tablet: 1 tab(s) orally once a day (17 Jan 2022 22:39)  gabapentin 100 mg oral capsule: 1 cap(s) orally once a day (17 Jan 2022 22:39)  lactobacillus acidophilus oral capsule: 1 tab(s) orally 2 times a day (17 Jan 2022 22:39)  pantoprazole 40 mg oral delayed release tablet: 1 tab(s) orally once a day (before a meal) (17 Jan 2022 22:39)  Tradjenta 5 mg oral tablet: 1 tab(s) orally once a day (17 Jan 2022 22:39)      Family History: Non-contributory family history of premature cardiovascular atherosclerotic disease    Social History: No tobacco, alcohol or drug use    Review of Systems:  General: No fevers, chills, weight gain  Skin: No rashes, color changes  Cardiovascular: No chest pain, orthopnea  Respiratory: No shortness of breath, cough  Gastrointestinal: No nausea, abdominal pain  Genitourinary: No incontinence, pain with urination  Musculoskeletal: No pain, swelling, decreased range of motion  Neurological: No headache, weakness  Psychiatric: No depression, anxiety  Endocrine: No weight gain, increased thirst  All other systems are comprehensively negative.    Physical Exam:  Vitals:        Vital Signs Last 24 Hrs  T(C): 37.1 (18 Jan 2022 07:20), Max: 37.1 (18 Jan 2022 07:20)  T(F): 98.8 (18 Jan 2022 07:20), Max: 98.8 (18 Jan 2022 07:20)  HR: 125 (18 Jan 2022 07:46) (105 - 141)  BP: 117/77 (18 Jan 2022 07:20) (104/62 - 139/74)  BP(mean): --  RR: 20 (18 Jan 2022 07:46) (18 - 20)  SpO2: 98% (18 Jan 2022 07:46) (94% - 99%)  General: NAD  HEENT: MMM  Neck: No JVD, no carotid bruit  Lungs: CTAB  CV: RRR, nl S1/S2, no M/R/G  Abdomen: S/NT/ND, +BS  Extremities: No LE edema, no cyanosis  Neuro: AAOx3, non-focal  Skin: No rash    Labs:                        14.8   12.96 )-----------( 257      ( 17 Jan 2022 22:03 )             45.3     01-17    143  |  103  |  50<H>  ----------------------------<  158<H>  4.3   |  29  |  1.99<H>    Ca    8.6      17 Jan 2022 22:03  Phos  2.9     01-17  Mg     1.6     01-17    TPro  6.9  /  Alb  3.2<L>  /  TBili  0.6  /  DBili  x   /  AST  20  /  ALT  22  /  AlkPhos  136<H>  01-17            ECG: AF, LAD, nonspecific ST abnormality

## 2022-01-18 NOTE — CONSULT NOTE ADULT - ASSESSMENT
The patient is a 89 year old male with a history of HTN, HL, DM, CKD, carotid stenosis, DVT, atrial fibrillation, chronic systolic heart failure who presented with nausea and vomiting.     Plan:  - Unclear etiology of nausea and vomiting - appears largely resolved at this time  - HR in the 100-120 range - in part due to missing medications yesterday, but patient has been running in this range  - Continue current regimen of digoxin 0.125 mg daily, diltiazem  mg daily, metoprolol tartrate 100 mg bid  - Currently asymptomatic from a rhythm perspective  - Follow-up with EP as outpatient

## 2022-01-18 NOTE — ED ADULT NURSE REASSESSMENT NOTE - NS ED NURSE REASSESS COMMENT FT1
pt discharged awaiting son for . safety maintained.
pt provided urinal. output 200 ccs. safety maintained.
Patient had vomited once on arrival, medicated as per orders. No further vomiting noted.
pt hr more consistent around 130s, lopressor given as ordered. pt maintained on CM

## 2022-01-19 PROBLEM — I48.20 CHRONIC ATRIAL FIBRILLATION, UNSPECIFIED: Chronic | Status: ACTIVE | Noted: 2022-01-17

## 2022-01-20 ENCOUNTER — APPOINTMENT (OUTPATIENT)
Dept: CARE COORDINATION | Facility: HOME HEALTH | Age: 87
End: 2022-01-20
Payer: MEDICARE

## 2022-01-20 VITALS
OXYGEN SATURATION: 96 % | HEART RATE: 81 BPM | SYSTOLIC BLOOD PRESSURE: 132 MMHG | WEIGHT: 208 LBS | RESPIRATION RATE: 15 BRPM | BODY MASS INDEX: 31.63 KG/M2 | TEMPERATURE: 97.7 F | DIASTOLIC BLOOD PRESSURE: 64 MMHG

## 2022-01-20 DIAGNOSIS — N18.9 CHRONIC KIDNEY DISEASE, UNSPECIFIED: ICD-10-CM

## 2022-01-20 DIAGNOSIS — E11.40 TYPE 2 DIABETES MELLITUS WITH DIABETIC NEUROPATHY, UNSPECIFIED: ICD-10-CM

## 2022-01-20 DIAGNOSIS — I50.22 CHRONIC SYSTOLIC (CONGESTIVE) HEART FAILURE: ICD-10-CM

## 2022-01-20 DIAGNOSIS — I48.91 UNSPECIFIED ATRIAL FIBRILLATION: ICD-10-CM

## 2022-01-20 PROCEDURE — 99496 TRANSJ CARE MGMT HIGH F2F 7D: CPT

## 2022-01-20 PROCEDURE — 99497 ADVNCD CARE PLAN 30 MIN: CPT

## 2022-01-23 PROBLEM — I50.22 CHRONIC SYSTOLIC CONGESTIVE HEART FAILURE: Status: ACTIVE | Noted: 2022-01-23

## 2022-01-23 PROBLEM — E11.40 DIABETIC NEUROPATHY: Status: ACTIVE | Noted: 2022-01-23

## 2022-01-23 PROBLEM — I48.91 ATRIAL FIBRILLATION: Status: ACTIVE | Noted: 2022-01-23

## 2022-01-23 PROBLEM — N18.9 CKD (CHRONIC KIDNEY DISEASE): Status: ACTIVE | Noted: 2022-01-23

## 2022-01-23 RX ORDER — FUROSEMIDE 40 MG/1
40 TABLET ORAL DAILY
Qty: 30 | Refills: 0 | Status: ACTIVE | COMMUNITY
Start: 2022-01-23

## 2022-01-23 RX ORDER — DILTIAZEM HYDROCHLORIDE 240 MG/1
240 CAPSULE, EXTENDED RELEASE ORAL DAILY
Refills: 0 | Status: ACTIVE | COMMUNITY
Start: 2022-01-23

## 2022-01-23 RX ORDER — APIXABAN 2.5 MG/1
2.5 TABLET, FILM COATED ORAL
Qty: 60 | Refills: 1 | Status: ACTIVE | COMMUNITY
Start: 2022-01-23

## 2022-01-23 RX ORDER — LINAGLIPTIN 5 MG/1
5 TABLET, FILM COATED ORAL
Qty: 30 | Refills: 2 | Status: ACTIVE | COMMUNITY
Start: 2022-01-23

## 2022-01-23 RX ORDER — ATORVASTATIN CALCIUM 20 MG/1
20 TABLET, FILM COATED ORAL
Qty: 1 | Refills: 3 | Status: ACTIVE | COMMUNITY
Start: 2022-01-23

## 2022-01-23 RX ORDER — DIGOXIN 125 UG/1
125 TABLET ORAL DAILY
Qty: 30 | Refills: 3 | Status: ACTIVE | COMMUNITY
Start: 2022-01-23

## 2022-01-23 RX ORDER — METOPROLOL TARTRATE 100 MG/1
100 TABLET, FILM COATED ORAL
Qty: 180 | Refills: 3 | Status: ACTIVE | COMMUNITY
Start: 2022-01-23

## 2022-01-23 RX ORDER — GABAPENTIN 100 MG/1
100 CAPSULE ORAL
Qty: 30 | Refills: 0 | Status: ACTIVE | COMMUNITY
Start: 2022-01-23

## 2022-01-24 NOTE — HISTORY OF PRESENT ILLNESS
[Post-hospitalization from ___ Hospital] : Post-hospitalization from [unfilled] Hospital [Admitted on: ___] : The patient was admitted on [unfilled] [Discharged on ___] : discharged on [unfilled] [Discharge Summary] : discharge summary [Pertinent Labs] : pertinent labs [Discharge Med List] : discharge medication list [Med Reconciliation] : medication reconciliation has been completed [Patient Contacted By: ____] : and contacted by [unfilled] [FreeTextEntry2] : Copied from Mirian\par '89 y/o M with PMH of HTN, Type 2 DM, Dyslipidemia, PAD, CKD stage 3, Melanoma in situ s/p excision, Diverticulitis, Retinal Detachment s/p repair, OA, chronic systolic heart failure and Obesity s/p diagnosed with Afib, L deep femoral vein DVT on eliquis and mild CHF on 12/19/2021, admitted at [facility] and started on eliquis, digoxin presents with c/o generalized weakness, weight gain and shortness of breath the past few days. PCP, Dr. Richard had d/c'ed digoxin and started on lasix which he took for 2 days last week and then discontinued by cardiologist, Dr. Iglesias due to no issues with respiration and frequent urination. States that he has been taking lasix 20mg daily the past 2 days. States that he has been short of breath since lasix was stopped and was had 4lb weight gain and son stated pt was tachycardic in 130s. Pt admits to dyspnea with exertion. Denies chest pain, fever, cough, calf pain. Family spoke with pcp today who advised pt to come in to ED for eval.\par \par Patient admitted with acute on chronic HF likely secondary to fluid overload from WERO\par Patient was diuresed and meds adjusted  and Improved rapidly.'\par \par Pt seen at home for post hospitalization follow-up under [facility]. Pt AOx3,  denies SOB, CP or dizziness. stating that ' a lot in mind'. Pt feels tired and not feeling the same as before. living with son, Sabino. has aides during the day time. The aide reports that pt has fair appetite. pt has no acute complaints. \par

## 2022-01-24 NOTE — PHYSICAL EXAM
[No Acute Distress] : no acute distress [Well Nourished] : well nourished [Well Developed] : well developed [Well-Appearing] : well-appearing [Normal Voice/Communication] : normal voice/communication [Normal Sclera/Conjunctiva] : normal sclera/conjunctiva [PERRL] : pupils equal round and reactive to light [EOMI] : extraocular movements intact [Normal Outer Ear/Nose] : the outer ears and nose were normal in appearance [No JVD] : no jugular venous distention [Supple] : supple [Thyroid Normal, No Nodules] : the thyroid was normal and there were no nodules present [No Respiratory Distress] : no respiratory distress  [Clear to Auscultation] : lungs were clear to auscultation bilaterally [No Accessory Muscle Use] : no accessory muscle use [Normal Rate] : normal rate  [Normal S1, S2] : normal S1 and S2 [No Murmur] : no murmur heard [No Varicosities] : no varicosities [Pedal Pulses Present] : the pedal pulses are present [No Edema] : there was no peripheral edema [No Extremity Clubbing/Cyanosis] : no extremity clubbing/cyanosis [Soft] : abdomen soft [Non Tender] : non-tender [Non-distended] : non-distended [Normal Bowel Sounds] : normal bowel sounds [No CVA Tenderness] : no CVA  tenderness [No Spinal Tenderness] : no spinal tenderness [No Joint Swelling] : no joint swelling [Grossly Normal Strength/Tone] : grossly normal strength/tone [No Rash] : no rash [Normal Gait] : normal gait [Coordination Grossly Intact] : coordination grossly intact [No Focal Deficits] : no focal deficits [Deep Tendon Reflexes (DTR)] : deep tendon reflexes were 2+ and symmetric [Speech Grossly Normal] : speech grossly normal [Memory Grossly Normal] : memory grossly normal [Normal Affect] : the affect was normal [Alert and Oriented x3] : oriented to person, place, and time [Normal Mood] : the mood was normal [Normal Insight/Judgement] : insight and judgment were intact [de-identified] : irregular heart rhythm

## 2022-01-24 NOTE — REVIEW OF SYSTEMS
[Fever] : no fever [Chills] : no chills [Fatigue] : fatigue [Chest Pain] : no chest pain [Palpitations] : no palpitations [Claudication] : no  leg claudication [Lower Ext Edema] : no lower extremity edema [Orthopena] : no orthopnea [Paroxysmal Nocturnal Dyspnea] : no paroxysmal nocturnal dyspnea [Shortness Of Breath] : no shortness of breath [Wheezing] : no wheezing [Cough] : no cough [Dyspnea on Exertion] : dyspnea on exertion [Abdominal Pain] : no abdominal pain [Nausea] : no nausea [Constipation] : no constipation [Diarrhea] : no diarrhea [Vomiting] : no vomiting [Heartburn] : no heartburn [Melena] : no melena [Dysuria] : no dysuria [Incontinence] : no incontinence [Hesitancy] : no hesitancy [Nocturia] : no nocturia [Hematuria] : no hematuria [Frequency] : no frequency [Joint Pain] : no joint pain [Joint Stiffness] : no joint stiffness [Muscle Pain] : no muscle pain [Muscle Weakness] : no muscle weakness [Back Pain] : no back pain [Joint Swelling] : no joint swelling [Headache] : no headache [Dizziness] : no dizziness [Fainting] : no fainting [Confusion] : no confusion [Unsteady Walk] : no ataxia [Memory Loss] : no memory loss [Insomnia] : insomnia [Anxiety] : no anxiety [Depression] : no depression [Negative] : Heme/Lymph

## 2022-01-24 NOTE — HEALTH RISK ASSESSMENT
[Former] : Former [No] : No [No falls in past year] : Patient reported no falls in the past year [0] : 2) Feeling down, depressed, or hopeless: Not at all (0) [PHQ-2 Negative - No further assessment needed] : PHQ-2 Negative - No further assessment needed [[id number]] : 0 [Low Salt Diet] : low salt [Strict Adherence] : strict adherence [None] : None [de-identified] : assistance required [de-identified] : assistance required [With Patient/Caregiver] : , with patient/caregiver [Designated Healthcare Proxy] : Designated healthcare proxy [Name: ___] : Health Care Proxy's Name: [unfilled]  [Relationship: ___] : Relationship: [unfilled] [DNR] : DNR [DNI] : DNI [I will adhere to the patient's wishes.] : I will adhere to the patient's wishes. [Time Spent: ___ minutes] : Time Spent: [unfilled] minutes [AdvancecareDate] : 1/20/2022 [FreeTextEntry4] : Spoke to pt's son, Sabino.\par Pt and family want home hospice as pt does not wish to go back to hospital and pt is not a surgical candidate.

## 2022-01-24 NOTE — COUNSELING
[Fall prevention counseling provided] : Fall prevention counseling provided [Adequate lighting] : Adequate lighting [No throw rugs] : No throw rugs [Use proper foot wear] : Use proper foot wear [Use recommended devices] : Use recommended devices [de-identified] : 1. CHF\par    - low sodium diet\par    - 1-1.2L fluid restriction (including soup, tea, juice and waster)\par    - Daily weight monitor; if wt gain >1-2lb in 2-3 days, > 3-5lbs in a week, please call CCC/CN \par    - Daily BP monitor, if SBP<100, do not give BP meds, wait 1 hrs to recheck BP, if SBP<100 again, call CCC/CN\par \par 2. Pulmonary health\par    - Deep breathing exercise in sitting up tall or standing position, 10 times hourly\par  \par \par  [None] : None [Good understanding] : Patient has a good understanding of lifestyle changes and steps needed to achieve self management goal

## 2022-01-24 NOTE — ASSESSMENT
[FreeTextEntry1] : [] sCHF\par - stable, euvolemic on exam\par - continue furosemide 40mg \par - low salt diet, 1L fluid restriction, daily weight and BP monitor\par - Follow up with Cardiology\par \par [] Afib\par - rate controlled\par - continue digoxin, cardizem and Lopressor\par - continue Eliquis\par \par [] CKD\par - stable\par - CKD4 cr 1.99/ GFR 28 (1/17)\par - avoid nephrotoxic agents\par \par [] Care Coordination\par - discussed with Dr. Richard (PCP)\par - home hospice referral sent to [facility]\par \par Reviewed all medications at length with patient, All questions addressed. Worsening symptoms discussed with pt understanding. No issues or concerns at this time. Pt encouraged to call CCC/CN at [phone] w/any questions, concerns or issues. Will continue to follow up with patient status\par \par

## 2022-02-16 ENCOUNTER — INPATIENT (INPATIENT)
Facility: HOSPITAL | Age: 87
LOS: 9 days | Discharge: HOME HEALTH | DRG: 884 | End: 2022-02-26
Attending: INTERNAL MEDICINE | Admitting: INTERNAL MEDICINE
Payer: MEDICARE

## 2022-02-16 VITALS
RESPIRATION RATE: 18 BRPM | WEIGHT: 223.11 LBS | HEART RATE: 88 BPM | HEIGHT: 68 IN | DIASTOLIC BLOOD PRESSURE: 67 MMHG | SYSTOLIC BLOOD PRESSURE: 110 MMHG | TEMPERATURE: 98 F | OXYGEN SATURATION: 95 %

## 2022-02-16 DIAGNOSIS — M72.0 PALMAR FASCIAL FIBROMATOSIS [DUPUYTREN]: Chronic | ICD-10-CM

## 2022-02-16 DIAGNOSIS — H33.20 SEROUS RETINAL DETACHMENT, UNSPECIFIED EYE: Chronic | ICD-10-CM

## 2022-02-16 DIAGNOSIS — R41.82 ALTERED MENTAL STATUS, UNSPECIFIED: ICD-10-CM

## 2022-02-16 DIAGNOSIS — C44.91 BASAL CELL CARCINOMA OF SKIN, UNSPECIFIED: Chronic | ICD-10-CM

## 2022-02-16 DIAGNOSIS — Z98.89 OTHER SPECIFIED POSTPROCEDURAL STATES: Chronic | ICD-10-CM

## 2022-02-16 LAB
ALBUMIN SERPL ELPH-MCNC: 3 G/DL — LOW (ref 3.3–5)
ALP SERPL-CCNC: 182 U/L — HIGH (ref 30–120)
ALT FLD-CCNC: 15 U/L DA — SIGNIFICANT CHANGE UP (ref 10–60)
ANION GAP SERPL CALC-SCNC: 11 MMOL/L — SIGNIFICANT CHANGE UP (ref 5–17)
APTT BLD: 41 SEC — HIGH (ref 27.5–35.5)
AST SERPL-CCNC: 15 U/L — SIGNIFICANT CHANGE UP (ref 10–40)
BASOPHILS # BLD AUTO: 0.05 K/UL — SIGNIFICANT CHANGE UP (ref 0–0.2)
BASOPHILS NFR BLD AUTO: 0.5 % — SIGNIFICANT CHANGE UP (ref 0–2)
BILIRUB SERPL-MCNC: 0.5 MG/DL — SIGNIFICANT CHANGE UP (ref 0.2–1.2)
BUN SERPL-MCNC: 55 MG/DL — HIGH (ref 7–23)
CALCIUM SERPL-MCNC: 8.6 MG/DL — SIGNIFICANT CHANGE UP (ref 8.4–10.5)
CHLORIDE SERPL-SCNC: 97 MMOL/L — SIGNIFICANT CHANGE UP (ref 96–108)
CO2 SERPL-SCNC: 28 MMOL/L — SIGNIFICANT CHANGE UP (ref 22–31)
CREAT SERPL-MCNC: 2.29 MG/DL — HIGH (ref 0.5–1.3)
DIGOXIN SERPL-MCNC: 1.5 NG/ML — SIGNIFICANT CHANGE UP (ref 0.8–2)
EOSINOPHIL # BLD AUTO: 0.25 K/UL — SIGNIFICANT CHANGE UP (ref 0–0.5)
EOSINOPHIL NFR BLD AUTO: 2.7 % — SIGNIFICANT CHANGE UP (ref 0–6)
GLUCOSE SERPL-MCNC: 345 MG/DL — HIGH (ref 70–99)
HCT VFR BLD CALC: 41.4 % — SIGNIFICANT CHANGE UP (ref 39–50)
HGB BLD-MCNC: 13.7 G/DL — SIGNIFICANT CHANGE UP (ref 13–17)
IMM GRANULOCYTES NFR BLD AUTO: 1.1 % — SIGNIFICANT CHANGE UP (ref 0–1.5)
INR BLD: 1.61 RATIO — HIGH (ref 0.88–1.16)
LACTATE SERPL-SCNC: 2.5 MMOL/L — HIGH (ref 0.7–2)
LACTATE SERPL-SCNC: 2.5 MMOL/L — HIGH (ref 0.7–2)
LYMPHOCYTES # BLD AUTO: 2.11 K/UL — SIGNIFICANT CHANGE UP (ref 1–3.3)
LYMPHOCYTES # BLD AUTO: 22.4 % — SIGNIFICANT CHANGE UP (ref 13–44)
MCHC RBC-ENTMCNC: 29.8 PG — SIGNIFICANT CHANGE UP (ref 27–34)
MCHC RBC-ENTMCNC: 33.1 GM/DL — SIGNIFICANT CHANGE UP (ref 32–36)
MCV RBC AUTO: 90 FL — SIGNIFICANT CHANGE UP (ref 80–100)
MONOCYTES # BLD AUTO: 1.02 K/UL — HIGH (ref 0–0.9)
MONOCYTES NFR BLD AUTO: 10.8 % — SIGNIFICANT CHANGE UP (ref 2–14)
NEUTROPHILS # BLD AUTO: 5.88 K/UL — SIGNIFICANT CHANGE UP (ref 1.8–7.4)
NEUTROPHILS NFR BLD AUTO: 62.5 % — SIGNIFICANT CHANGE UP (ref 43–77)
NRBC # BLD: 0 /100 WBCS — SIGNIFICANT CHANGE UP (ref 0–0)
NT-PROBNP SERPL-SCNC: 5635 PG/ML — HIGH (ref 0–450)
PLATELET # BLD AUTO: 248 K/UL — SIGNIFICANT CHANGE UP (ref 150–400)
POTASSIUM SERPL-MCNC: 4.2 MMOL/L — SIGNIFICANT CHANGE UP (ref 3.5–5.3)
POTASSIUM SERPL-SCNC: 4.2 MMOL/L — SIGNIFICANT CHANGE UP (ref 3.5–5.3)
PROT SERPL-MCNC: 6.5 G/DL — SIGNIFICANT CHANGE UP (ref 6–8.3)
PROTHROM AB SERPL-ACNC: 19.1 SEC — HIGH (ref 10.6–13.6)
RBC # BLD: 4.6 M/UL — SIGNIFICANT CHANGE UP (ref 4.2–5.8)
RBC # FLD: 12.7 % — SIGNIFICANT CHANGE UP (ref 10.3–14.5)
SARS-COV-2 RNA SPEC QL NAA+PROBE: SIGNIFICANT CHANGE UP
SODIUM SERPL-SCNC: 136 MMOL/L — SIGNIFICANT CHANGE UP (ref 135–145)
WBC # BLD: 9.41 K/UL — SIGNIFICANT CHANGE UP (ref 3.8–10.5)
WBC # FLD AUTO: 9.41 K/UL — SIGNIFICANT CHANGE UP (ref 3.8–10.5)

## 2022-02-16 PROCEDURE — 70450 CT HEAD/BRAIN W/O DYE: CPT | Mod: 26,MA

## 2022-02-16 PROCEDURE — 93010 ELECTROCARDIOGRAM REPORT: CPT

## 2022-02-16 PROCEDURE — 71045 X-RAY EXAM CHEST 1 VIEW: CPT | Mod: 26

## 2022-02-16 PROCEDURE — 99285 EMERGENCY DEPT VISIT HI MDM: CPT

## 2022-02-16 RX ORDER — SODIUM CHLORIDE 9 MG/ML
250 INJECTION INTRAMUSCULAR; INTRAVENOUS; SUBCUTANEOUS ONCE
Refills: 0 | Status: COMPLETED | OUTPATIENT
Start: 2022-02-16 | End: 2022-02-16

## 2022-02-16 RX ORDER — SODIUM CHLORIDE 9 MG/ML
1000 INJECTION INTRAMUSCULAR; INTRAVENOUS; SUBCUTANEOUS ONCE
Refills: 0 | Status: DISCONTINUED | OUTPATIENT
Start: 2022-02-16 | End: 2022-02-16

## 2022-02-16 RX ADMIN — SODIUM CHLORIDE 250 MILLILITER(S): 9 INJECTION INTRAMUSCULAR; INTRAVENOUS; SUBCUTANEOUS at 22:38

## 2022-02-16 RX ADMIN — SODIUM CHLORIDE 250 MILLILITER(S): 9 INJECTION INTRAMUSCULAR; INTRAVENOUS; SUBCUTANEOUS at 21:38

## 2022-02-16 NOTE — ED PROVIDER NOTE - OBJECTIVE STATEMENT
pt is a 89yo male with pmhx of HTN, DM2, HLD, PAD, CKD stage 3, melanoma, diverticulitis, chf, a-fib, DVT on eliquis presents with ams. per son, xochilt, pt has had increased agitation over the past few days. per son pt with severe mental decline over the past month. pt seen by geriatric psychiatrist today who advised that pt should seek ed eval. pt unable to give information due to confusion.

## 2022-02-16 NOTE — ED PROVIDER NOTE - ATTENDING CONTRIBUTION TO CARE
Josh Lopez MD: I have personally performed a face to face diagnostic evaluation on this patient.  I have reviewed the PA note and agree with the history, exam, and plan of care, except as noted.  History and Exam by me shows same findings as documented

## 2022-02-16 NOTE — ED PROVIDER NOTE - NSICDXPASTMEDICALHX_GEN_ALL_CORE_FT
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

## 2022-02-16 NOTE — ED ADULT NURSE NOTE - NSFALLRSKHARMRISK_ED_ALL_ED
Patient was evaluated via telephone visit. Patient needs refill medication.  Will renew pain medication.  Patient continues on dialysis.  Patient's information was accessed in the [place] Prescription Monitoring Program prior to prescribing controlled substance.  I discussed the patient's case with the resident. I agree with the resident's finding and plan, as documented in today's note.      yes

## 2022-02-16 NOTE — ED PROVIDER NOTE - NS_ ATTENDINGSCRIBEDETAILS _ED_A_ED_FT
Josh Lopez MD - The scribe's documentation has been prepared under my direction and personally reviewed by me in its entirety. I confirm that the note above accurately reflects all work, treatment, procedures, and medical decision making performed by me.

## 2022-02-16 NOTE — ED PROVIDER NOTE - PROGRESS NOTE DETAILS
Rsoalie LEMUS for attending Dr. Lopez: 89 y/o M with PMH of HTN, Type 2 DM, Dyslipidemia, PAD, CKD stage 3, Melanoma in situ s/p excision, Diverticulitis, Retinal Detachment s/p repair, OA, chronic systolic heart failure and Obesity s/p diagnosed with Afib, L deep femoral vein DVT on Eliquis and mild CHF on 12/19/2021 brought in by EMS from home for agitation and aggressive behavior according to son. Pt has a history of dementia with recent decline. Pt offers no complaints at this time but is obviously confused. Physical exam noncontributory. Will get labs and pt will likely require admission for placement in a higher level of care.

## 2022-02-16 NOTE — ED ADULT NURSE NOTE - INTERVENTIONS DEFINITIONS
[place] to call system/Call bell, personal items and telephone within reach/Instruct patient to call for assistance/Room bathroom lighting operational/Non-slip footwear when patient is off stretcher/Physically safe environment: no spills, clutter or unnecessary equipment/Stretcher in lowest position, wheels locked, appropriate side rails in place/Provide visual cue, wrist band, yellow gown, etc./Monitor gait and stability

## 2022-02-17 DIAGNOSIS — N40.0 BENIGN PROSTATIC HYPERPLASIA WITHOUT LOWER URINARY TRACT SYMPTOMS: ICD-10-CM

## 2022-02-17 DIAGNOSIS — N18.30 CHRONIC KIDNEY DISEASE, STAGE 3 UNSPECIFIED: ICD-10-CM

## 2022-02-17 DIAGNOSIS — E11.65 TYPE 2 DIABETES MELLITUS WITH HYPERGLYCEMIA: ICD-10-CM

## 2022-02-17 DIAGNOSIS — I50.22 CHRONIC SYSTOLIC (CONGESTIVE) HEART FAILURE: ICD-10-CM

## 2022-02-17 DIAGNOSIS — I48.20 CHRONIC ATRIAL FIBRILLATION, UNSPECIFIED: ICD-10-CM

## 2022-02-17 DIAGNOSIS — R79.89 OTHER SPECIFIED ABNORMAL FINDINGS OF BLOOD CHEMISTRY: ICD-10-CM

## 2022-02-17 DIAGNOSIS — Z29.9 ENCOUNTER FOR PROPHYLACTIC MEASURES, UNSPECIFIED: ICD-10-CM

## 2022-02-17 DIAGNOSIS — R41.82 ALTERED MENTAL STATUS, UNSPECIFIED: ICD-10-CM

## 2022-02-17 DIAGNOSIS — Z86.718 PERSONAL HISTORY OF OTHER VENOUS THROMBOSIS AND EMBOLISM: ICD-10-CM

## 2022-02-17 LAB
A1C WITH ESTIMATED AVERAGE GLUCOSE RESULT: 8.7 % — HIGH (ref 4–5.6)
ALBUMIN SERPL ELPH-MCNC: 2.9 G/DL — LOW (ref 3.3–5)
ALP SERPL-CCNC: 171 U/L — HIGH (ref 30–120)
ALT FLD-CCNC: 12 U/L DA — SIGNIFICANT CHANGE UP (ref 10–60)
ANION GAP SERPL CALC-SCNC: 9 MMOL/L — SIGNIFICANT CHANGE UP (ref 5–17)
APPEARANCE UR: CLEAR — SIGNIFICANT CHANGE UP
AST SERPL-CCNC: 16 U/L — SIGNIFICANT CHANGE UP (ref 10–40)
BACTERIA # UR AUTO: NEGATIVE — SIGNIFICANT CHANGE UP
BASOPHILS # BLD AUTO: 0.04 K/UL — SIGNIFICANT CHANGE UP (ref 0–0.2)
BASOPHILS NFR BLD AUTO: 0.4 % — SIGNIFICANT CHANGE UP (ref 0–2)
BILIRUB SERPL-MCNC: 0.5 MG/DL — SIGNIFICANT CHANGE UP (ref 0.2–1.2)
BILIRUB UR-MCNC: NEGATIVE — SIGNIFICANT CHANGE UP
BUN SERPL-MCNC: 50 MG/DL — HIGH (ref 7–23)
CALCIUM SERPL-MCNC: 8.4 MG/DL — SIGNIFICANT CHANGE UP (ref 8.4–10.5)
CHLORIDE SERPL-SCNC: 101 MMOL/L — SIGNIFICANT CHANGE UP (ref 96–108)
CO2 SERPL-SCNC: 31 MMOL/L — SIGNIFICANT CHANGE UP (ref 22–31)
COLOR SPEC: YELLOW — SIGNIFICANT CHANGE UP
CREAT SERPL-MCNC: 2 MG/DL — HIGH (ref 0.5–1.3)
DIFF PNL FLD: NEGATIVE — SIGNIFICANT CHANGE UP
EOSINOPHIL # BLD AUTO: 0.3 K/UL — SIGNIFICANT CHANGE UP (ref 0–0.5)
EOSINOPHIL NFR BLD AUTO: 3.2 % — SIGNIFICANT CHANGE UP (ref 0–6)
EPI CELLS # UR: SIGNIFICANT CHANGE UP
ESTIMATED AVERAGE GLUCOSE: 203 MG/DL — HIGH (ref 68–114)
GLUCOSE SERPL-MCNC: 244 MG/DL — HIGH (ref 70–99)
GLUCOSE UR QL: NEGATIVE MG/DL — SIGNIFICANT CHANGE UP
HCT VFR BLD CALC: 41 % — SIGNIFICANT CHANGE UP (ref 39–50)
HGB BLD-MCNC: 13.9 G/DL — SIGNIFICANT CHANGE UP (ref 13–17)
IMM GRANULOCYTES NFR BLD AUTO: 0.9 % — SIGNIFICANT CHANGE UP (ref 0–1.5)
KETONES UR-MCNC: NEGATIVE — SIGNIFICANT CHANGE UP
LACTATE SERPL-SCNC: 1.4 MMOL/L — SIGNIFICANT CHANGE UP (ref 0.7–2)
LEUKOCYTE ESTERASE UR-ACNC: NEGATIVE — SIGNIFICANT CHANGE UP
LYMPHOCYTES # BLD AUTO: 2 K/UL — SIGNIFICANT CHANGE UP (ref 1–3.3)
LYMPHOCYTES # BLD AUTO: 21.1 % — SIGNIFICANT CHANGE UP (ref 13–44)
MCHC RBC-ENTMCNC: 30.5 PG — SIGNIFICANT CHANGE UP (ref 27–34)
MCHC RBC-ENTMCNC: 33.9 GM/DL — SIGNIFICANT CHANGE UP (ref 32–36)
MCV RBC AUTO: 90.1 FL — SIGNIFICANT CHANGE UP (ref 80–100)
MONOCYTES # BLD AUTO: 0.91 K/UL — HIGH (ref 0–0.9)
MONOCYTES NFR BLD AUTO: 9.6 % — SIGNIFICANT CHANGE UP (ref 2–14)
NEUTROPHILS # BLD AUTO: 6.15 K/UL — SIGNIFICANT CHANGE UP (ref 1.8–7.4)
NEUTROPHILS NFR BLD AUTO: 64.8 % — SIGNIFICANT CHANGE UP (ref 43–77)
NITRITE UR-MCNC: NEGATIVE — SIGNIFICANT CHANGE UP
NRBC # BLD: 0 /100 WBCS — SIGNIFICANT CHANGE UP (ref 0–0)
PH UR: 5 — SIGNIFICANT CHANGE UP (ref 5–8)
PLATELET # BLD AUTO: 237 K/UL — SIGNIFICANT CHANGE UP (ref 150–400)
POTASSIUM SERPL-MCNC: 3.5 MMOL/L — SIGNIFICANT CHANGE UP (ref 3.5–5.3)
POTASSIUM SERPL-SCNC: 3.5 MMOL/L — SIGNIFICANT CHANGE UP (ref 3.5–5.3)
PROT SERPL-MCNC: 6 G/DL — SIGNIFICANT CHANGE UP (ref 6–8.3)
PROT UR-MCNC: 15 MG/DL
RBC # BLD: 4.55 M/UL — SIGNIFICANT CHANGE UP (ref 4.2–5.8)
RBC # FLD: 12.7 % — SIGNIFICANT CHANGE UP (ref 10.3–14.5)
RBC CASTS # UR COMP ASSIST: NEGATIVE /HPF — SIGNIFICANT CHANGE UP (ref 0–4)
SODIUM SERPL-SCNC: 141 MMOL/L — SIGNIFICANT CHANGE UP (ref 135–145)
SP GR SPEC: 1.01 — SIGNIFICANT CHANGE UP (ref 1.01–1.02)
TSH SERPL-MCNC: 1.43 UIU/ML — SIGNIFICANT CHANGE UP (ref 0.27–4.2)
UROBILINOGEN FLD QL: NEGATIVE MG/DL — SIGNIFICANT CHANGE UP
WBC # BLD: 9.49 K/UL — SIGNIFICANT CHANGE UP (ref 3.8–10.5)
WBC # FLD AUTO: 9.49 K/UL — SIGNIFICANT CHANGE UP (ref 3.8–10.5)
WBC UR QL: SIGNIFICANT CHANGE UP

## 2022-02-17 PROCEDURE — 99497 ADVNCD CARE PLAN 30 MIN: CPT | Mod: 25

## 2022-02-17 PROCEDURE — 99223 1ST HOSP IP/OBS HIGH 75: CPT

## 2022-02-17 PROCEDURE — 99223 1ST HOSP IP/OBS HIGH 75: CPT | Mod: AI

## 2022-02-17 RX ORDER — INSULIN LISPRO 100/ML
VIAL (ML) SUBCUTANEOUS
Refills: 0 | Status: DISCONTINUED | OUTPATIENT
Start: 2022-02-17 | End: 2022-02-21

## 2022-02-17 RX ORDER — INSULIN LISPRO 100/ML
VIAL (ML) SUBCUTANEOUS AT BEDTIME
Refills: 0 | Status: DISCONTINUED | OUTPATIENT
Start: 2022-02-17 | End: 2022-02-20

## 2022-02-17 RX ORDER — SODIUM CHLORIDE 9 MG/ML
1000 INJECTION, SOLUTION INTRAVENOUS
Refills: 0 | Status: DISCONTINUED | OUTPATIENT
Start: 2022-02-17 | End: 2022-02-21

## 2022-02-17 RX ORDER — PANTOPRAZOLE SODIUM 20 MG/1
40 TABLET, DELAYED RELEASE ORAL
Refills: 0 | Status: DISCONTINUED | OUTPATIENT
Start: 2022-02-17 | End: 2022-02-26

## 2022-02-17 RX ORDER — DEXTROSE 50 % IN WATER 50 %
15 SYRINGE (ML) INTRAVENOUS ONCE
Refills: 0 | Status: DISCONTINUED | OUTPATIENT
Start: 2022-02-17 | End: 2022-02-21

## 2022-02-17 RX ORDER — GLUCAGON INJECTION, SOLUTION 0.5 MG/.1ML
1 INJECTION, SOLUTION SUBCUTANEOUS ONCE
Refills: 0 | Status: DISCONTINUED | OUTPATIENT
Start: 2022-02-17 | End: 2022-02-26

## 2022-02-17 RX ORDER — MIRABEGRON 50 MG/1
1 TABLET, EXTENDED RELEASE ORAL
Qty: 0 | Refills: 0 | DISCHARGE

## 2022-02-17 RX ORDER — INSULIN GLARGINE 100 [IU]/ML
7 INJECTION, SOLUTION SUBCUTANEOUS EVERY MORNING
Refills: 0 | Status: DISCONTINUED | OUTPATIENT
Start: 2022-02-17 | End: 2022-02-18

## 2022-02-17 RX ORDER — LACTOBACILLUS ACIDOPHILUS 100MM CELL
1 CAPSULE ORAL
Refills: 0 | Status: DISCONTINUED | OUTPATIENT
Start: 2022-02-17 | End: 2022-02-21

## 2022-02-17 RX ORDER — GABAPENTIN 400 MG/1
100 CAPSULE ORAL DAILY
Refills: 0 | Status: DISCONTINUED | OUTPATIENT
Start: 2022-02-17 | End: 2022-02-26

## 2022-02-17 RX ORDER — DEXTROSE 50 % IN WATER 50 %
25 SYRINGE (ML) INTRAVENOUS ONCE
Refills: 0 | Status: DISCONTINUED | OUTPATIENT
Start: 2022-02-17 | End: 2022-02-21

## 2022-02-17 RX ORDER — SITAGLIPTIN 50 MG/1
1 TABLET, FILM COATED ORAL
Qty: 0 | Refills: 0 | DISCHARGE

## 2022-02-17 RX ORDER — MIRTAZAPINE 45 MG/1
15 TABLET, ORALLY DISINTEGRATING ORAL
Qty: 0 | Refills: 0 | DISCHARGE

## 2022-02-17 RX ORDER — DEXTROSE 50 % IN WATER 50 %
12.5 SYRINGE (ML) INTRAVENOUS ONCE
Refills: 0 | Status: DISCONTINUED | OUTPATIENT
Start: 2022-02-17 | End: 2022-02-21

## 2022-02-17 RX ORDER — TAMSULOSIN HYDROCHLORIDE 0.4 MG/1
1 CAPSULE ORAL
Qty: 0 | Refills: 0 | DISCHARGE

## 2022-02-17 RX ORDER — DILTIAZEM HCL 120 MG
240 CAPSULE, EXT RELEASE 24 HR ORAL DAILY
Refills: 0 | Status: DISCONTINUED | OUTPATIENT
Start: 2022-02-17 | End: 2022-02-26

## 2022-02-17 RX ORDER — TAMSULOSIN HYDROCHLORIDE 0.4 MG/1
0.4 CAPSULE ORAL AT BEDTIME
Refills: 0 | Status: DISCONTINUED | OUTPATIENT
Start: 2022-02-17 | End: 2022-02-26

## 2022-02-17 RX ORDER — APIXABAN 2.5 MG/1
2.5 TABLET, FILM COATED ORAL
Refills: 0 | Status: DISCONTINUED | OUTPATIENT
Start: 2022-02-17 | End: 2022-02-26

## 2022-02-17 RX ORDER — HEPARIN SODIUM 5000 [USP'U]/ML
5000 INJECTION INTRAVENOUS; SUBCUTANEOUS EVERY 8 HOURS
Refills: 0 | Status: DISCONTINUED | OUTPATIENT
Start: 2022-02-17 | End: 2022-02-17

## 2022-02-17 RX ORDER — ATORVASTATIN CALCIUM 80 MG/1
20 TABLET, FILM COATED ORAL AT BEDTIME
Refills: 0 | Status: DISCONTINUED | OUTPATIENT
Start: 2022-02-17 | End: 2022-02-21

## 2022-02-17 RX ORDER — FUROSEMIDE 40 MG
40 TABLET ORAL DAILY
Refills: 0 | Status: DISCONTINUED | OUTPATIENT
Start: 2022-02-17 | End: 2022-02-26

## 2022-02-17 RX ORDER — DIGOXIN 250 MCG
125 TABLET ORAL DAILY
Refills: 0 | Status: DISCONTINUED | OUTPATIENT
Start: 2022-02-17 | End: 2022-02-26

## 2022-02-17 RX ORDER — METOPROLOL TARTRATE 50 MG
100 TABLET ORAL
Refills: 0 | Status: DISCONTINUED | OUTPATIENT
Start: 2022-02-17 | End: 2022-02-19

## 2022-02-17 RX ORDER — MIRTAZAPINE 45 MG/1
15 TABLET, ORALLY DISINTEGRATING ORAL AT BEDTIME
Refills: 0 | Status: DISCONTINUED | OUTPATIENT
Start: 2022-02-17 | End: 2022-02-26

## 2022-02-17 RX ADMIN — ATORVASTATIN CALCIUM 20 MILLIGRAM(S): 80 TABLET, FILM COATED ORAL at 21:02

## 2022-02-17 RX ADMIN — TAMSULOSIN HYDROCHLORIDE 0.4 MILLIGRAM(S): 0.4 CAPSULE ORAL at 21:02

## 2022-02-17 RX ADMIN — APIXABAN 2.5 MILLIGRAM(S): 2.5 TABLET, FILM COATED ORAL at 10:44

## 2022-02-17 RX ADMIN — Medication 0.5 MILLIGRAM(S): at 01:00

## 2022-02-17 RX ADMIN — INSULIN GLARGINE 7 UNIT(S): 100 INJECTION, SOLUTION SUBCUTANEOUS at 08:13

## 2022-02-17 RX ADMIN — Medication 4: at 08:13

## 2022-02-17 RX ADMIN — Medication 1: at 21:34

## 2022-02-17 RX ADMIN — Medication 2: at 12:54

## 2022-02-17 RX ADMIN — Medication 4: at 17:09

## 2022-02-17 RX ADMIN — Medication 125 MICROGRAM(S): at 07:58

## 2022-02-17 RX ADMIN — PANTOPRAZOLE SODIUM 40 MILLIGRAM(S): 20 TABLET, DELAYED RELEASE ORAL at 07:58

## 2022-02-17 RX ADMIN — Medication 100 MILLIGRAM(S): at 17:12

## 2022-02-17 RX ADMIN — APIXABAN 2.5 MILLIGRAM(S): 2.5 TABLET, FILM COATED ORAL at 21:02

## 2022-02-17 RX ADMIN — Medication 1 TABLET(S): at 17:12

## 2022-02-17 RX ADMIN — MIRTAZAPINE 15 MILLIGRAM(S): 45 TABLET, ORALLY DISINTEGRATING ORAL at 21:02

## 2022-02-17 RX ADMIN — Medication 240 MILLIGRAM(S): at 07:58

## 2022-02-17 RX ADMIN — Medication 40 MILLIGRAM(S): at 10:44

## 2022-02-17 NOTE — H&P ADULT - HISTORY OF PRESENT ILLNESS
This is a 89 y/o M with PMH of HTN, DM type 2 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 Fib, DVT on Eliquis, CKD stage 3, Malignant Melanoma, Basal Cell Carcinoma, Diverticulitis, Obesity, and Dementia who presented with AMS & agitation over the past 2 days, son reports longstanding dementia but rapid decline over the past month, was agitated & more confused over the past 2 days, son unable to take care of him & request placement. Patient is non communication, was agitated & received Ativan 0.5 mg IM X1, currently asleep but easily arousable, confused, then returns back to sleep. No further history could be obtained at this time.    This is a 89 y/o M with PMH of HTN, DM type 2 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 Fib, DVT on Eliquis, CKD stage 3, BPH, Malignant Melanoma, Basal Cell Carcinoma, Diverticulitis, Obesity, and Dementia who presented with AMS & agitation over the past 2 days, son reports longstanding dementia but rapid decline over the past month, was agitated & more confused over the past 2 days, son unable to take care of him & request placement. Patient is non communication, was agitated & received Ativan 0.5 mg IM X1, currently asleep but easily arousable, confused, then returns back to sleep. No further history could be obtained at this time.

## 2022-02-17 NOTE — H&P ADULT - NSHPPHYSICALEXAM_GEN_ALL_CORE
-    Vital Signs Last 24 Hrs  T(C): 36.6 (17 Feb 2022 04:46), Max: 36.6 (16 Feb 2022 20:11)  T(F): 97.8 (17 Feb 2022 04:46), Max: 97.9 (16 Feb 2022 20:11)  HR: 108 (17 Feb 2022 04:46) (88 - 120)  BP: 114/67 (17 Feb 2022 04:46) (110/67 - 151/67)  BP(mean): 83 (17 Feb 2022 04:46) (83 - 83)  RR: 18 (17 Feb 2022 04:46) (18 - 20)  SpO2: 93% (17 Feb 2022 04:46) (93% - 97%)        PHYSICAL EXAM:  		  GENERAL: NAD, well-developed, not in respiratory distress.  HEAD:  Atraumatic, Norm cephalic.  EYES: PERRLA, conjunctiva clear.  ENMT: no nasal discharge, MMM.   NECK: Supple, No JVD.  NERVOUS SYSTEM:  sleepy after Ativan but easily arousable, confused, but goes back to sleep again fast, otherwise neurologically intact grossly.  CHEST/LUNG: Fair air entry B/L, (+) insp rales B/L, scattered insp & exp occasional rhonchi.  HEART: Variable S1 & normal S2, no murmurs, or extra sounds.  ABDOMEN: Soft, non-tender, non-distended; bowel sounds present, no palpable masses or organomegaly.  EXTREMITIES:  No clubbing, cyanosis, or edema.  VASCULAR: 2+ radial, brachial pulses B/L.  SKIN: No rashes or lesions, (+) multiple ecchymose, right lower leg stasis dermatitis.  PSYCH: Agitated, but responded well to IM Ativan.

## 2022-02-17 NOTE — CONSULT NOTE ADULT - ASSESSMENT
home hospice  DNR/DNI 89 y/o M with PMH of HTN, DM type 2 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 Fib, DVT on Eliquis, CKD stage 3, BPH, Malignant Melanoma, Basal Cell Carcinoma, Diverticulitis, Obesity, and Dementia presented with AMS & agitation.       #AMS (altered mental status).   ·  Plan: ML worsening of his dementia, CT brain without contrast was negative, no focal neurological deficit noted or reported by family, but confused, "praying" all the time when calm, reported to have progressive decline over the past month, agitated over the past 2 days & son unable to handle him, responded well to Ativan 0.5 mg IM,  Seroquel, ativan IM PRN, neurology consult with Dr. Nguyen was called,   Psych dr Blair called .  Palliative care consult -Dr Majano discussed with Son Sabino.  For hospice care at home.      Problem/Plan - 2:  ·  Problem: DM (diabetes mellitus), type 2, uncontrolled.   ·  Plan: random plasma glucose was 345 mg/dl on presentation, hold Januvia & Tradjenta, started on Lantus 7 units subcutaneously daily in addition to insulin Lispro on a sliding scale before meals & at bedtime. adjust the Lantus dose based on total daily insulin requirements.   A1c 7.1     Problem/Plan - 43  ·  Problem: Chronic systolic congestive heart failure.   ·  Plan: stable, continue PO Lasix, Metoprolol,   Dc Digoxin for likely link with new onset delirium   Obtain cardiology consult dr Beny Iglesias      Problem/Plan - 5:  ·  Problem: History of DVT in adulthood.   ·  Plan: continue on renal dose Eliquis.     Problem/Plan - 6:  ·  Problem: Chronic atrial fibrillation.   ·  Plan: Rate controlled on Metoprolol & Diltiazem CR,   Continue on Eliquis for recent left femoral DVT.     Problem/Plan - 7:  ·  Problem: Stage 3 chronic kidney disease.   ·  Plan: ML HARESH on top of stage 3 CKD, monitor renal indices, avoid nephrotoxins & renally dose meds.      home hospice  DNR/DNI 89 y/o M with PMH of HTN, DM type 2 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 Fib, DVT on Eliquis, CKD stage 3, BPH, Malignant Melanoma, Basal Cell Carcinoma, Diverticulitis, Obesity, and Dementia presented with AMS & agitation.       #AMS (altered mental status).   #Worsening of dementia  CT brain negative  reported to have progressive decline over the past month with now being mostly sedentary (bed or recliner) with rehospitalization  Seroquel, ativan IM PRN  Neurology and Psych consulted    #Chronic systolic congestive heart failure.   continue PO Lasix, Metoprolol,     #History of DVT in adulthood.   continue on renal dose Eliquis.    #Chronic atrial fibrillation.   Rate controlled on Metoprolol & Diltiazem CR,   Continue on Eliquis for recent left femoral DVT.    #HARESH on top of stage 3 CKD  monitor renal indices, avoid nephrotoxins & renally dose meds.    #Goals of care/advance care planning  - Palliative performance scale score: 40% (poor)  - Prognosis: weeks-months (pt is hospice eligible)   - Code status: DNR/DNI (MOLST form filled out and placed in the chart)  - GOC:Home hospice upon discharge.   - Psychosocial support provided  - SW to start referral to hospice    Appreciate consult. Discussed with the primary team.    Melecio Ross MD, JAREN-C

## 2022-02-17 NOTE — H&P ADULT - PROBLEM SELECTOR PLAN 9
High risk for recurrent VTE, already anticoagulated on Eliquis for recent left femoral DVT, continue on renal dose.

## 2022-02-17 NOTE — PATIENT PROFILE ADULT - FALL HARM RISK - HARM RISK INTERVENTIONS

## 2022-02-17 NOTE — CONSULT NOTE ADULT - CONVERSATION DETAILS
Reviewed patient's medical and social history as well as events leading to patient's hospitalization. Writer discussed patient's current diagnosis (advanced dementia progressed over the past weeks, worsening mental status with agitation, advanced age, CHF, HARESH on CKD, rehospitalization), medical condition and management,  prognosis, and life expectancy. Inquired about patient's wishes regarding extent of medical care to be provided including escalation of medical care into the ICU and use of vasopressor support. In addition, the writer inquired about thoughts regarding cardiopulmonary resuscitation, artificial nutrition and hydration including use of feeding tubes and IVF, antibiotics, and further investigative studies such as blood draws and radiology. Son showed insight into medical condition. All questions answered. Son stated that pt had DNR/DNI status previously and wishes are to stay home and be kept comfortable. All siblings are In agreement with home hospice. MOLST form filled out and placed in chart. Psychosocial support provided.    Due to patient's health status and restrictions on visitations during this Public health emergency, advanced care planning discussion was performed via telephone.

## 2022-02-17 NOTE — PATIENT PROFILE ADULT - NSPRESCRUSEDDRG_GEN_A_NUR
[facility]    Date: 2017  Patient Name: Elham Wiggins        MRN: [id number]    Account #: [de-identified]  : 2010  (9 y.o.)  Gender: female             REASON FOR MISSED TREATMENT:    [x]Cancelled due to illness. [] Therapist Canceled Appointment  []Cancelled due to other appointment   []No Show / No call. Pt's guardian called with next scheduled appointment. [] Cancelled due to transportation conflict  []Cancelled due to weather  []Frequency of order changed  []Patient on hold due to:     [] Excused absence d/t at least 48 hour notice of cancellation      []Cancel /less than 48 hour notice.         []OTHER:        Electronically signed by:    Guerrero Mcghee PT             Date:2017
No

## 2022-02-17 NOTE — PROGRESS NOTE ADULT - ASSESSMENT
89 y/o M with PMH of HTN, DM type 2 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 Fib, DVT on Eliquis, CKD stage 3, BPH, Malignant Melanoma, Basal Cell Carcinoma, Diverticulitis, Obesity, and Dementia presented with AMS & agitation.        Problem/Plan - 1:  ·  Problem: AMS (altered mental status).   ·  Plan: ML worsening of his dementia, CT brain without contrast was negative, no focal neurological deficit noted or reported by family, but confused, "praying" all the time when calm, reported to have progressive decline over the past month, agitated over the past 2 days & son unable to handle him, responded well to Ativan 0.5 mg IM,  Seroquel, ativan IM PRN, neurology consult with Dr. Nguyen was called,   Psych dr Blair called .  Palliative care consult -Dr Majano discussed with Kirill Gallo.  For hospice care at home.      Problem/Plan - 2:  ·  Problem: DM (diabetes mellitus), type 2, uncontrolled.   ·  Plan: random plasma glucose was 345 mg/dl on presentation, hold Januvia & Tradjenta, started on Lantus 7 units subcutaneously daily in addition to insulin Lispro on a sliding scale before meals & at bedtime. adjust the Lantus dose based on total daily insulin requirements.   A1c 7.1     Problem/Plan - 43  ·  Problem: Chronic systolic congestive heart failure.   ·  Plan: stable, continue PO Lasix, Metoprolol,   Dc Digoxin for likely link with new onset delirium   Obtain cardiology consult dr Beny Iglesias      Problem/Plan - 5:  ·  Problem: History of DVT in adulthood.   ·  Plan: continue on renal dose Eliquis.     Problem/Plan - 6:  ·  Problem: Chronic atrial fibrillation.   ·  Plan: Rate controlled on Metoprolol & Diltiazem CR,   Continue on Eliquis for recent left femoral DVT.     Problem/Plan - 7:  ·  Problem: Stage 3 chronic kidney disease.   ·  Plan: ML HARESH on top of stage 3 CKD, monitor renal indices, avoid nephrotoxins & renally dose meds.     Problem/Plan - 8:  ·  Problem: BPH without urinary obstruction.   ·  Plan: on Flomax, continue.     Problem/Plan - 9:  ·  Problem: Need for prophylactic measure.   ·  Plan: High risk for recurrent VTE, already anticoagulated on Eliquis for recent left femoral DVT, continue on renal dose.    Disposition:  Palliative care dr Majano discussed with son Sabino.  For hospice at  home. Supportive care .  DNR/DNI

## 2022-02-17 NOTE — CONSULT NOTE ADULT - ASSESSMENT
The patient is a 89 year old male with a history of HTN, HL, DM, CKD, carotid stenosis, DVT, atrial fibrillation, chronic systolic heart failure who presents with AMS.    Plan:  - Digoxin use is a rare cause of delirium in the doses used today and would be associated with digoxin toxicity. The patient's digoxin level was within normal limits. Other etiologies may more likely explain the patient's delirium, especially in a patient with multiple recent hospitalizations and mental decline prior to this.  - Continue metoprolol tartrate 100 mg bid  - Continue diltiazem  mg daily  - Can hold digoxin if needed, although expect patient to become tachycardia  - Continue furosemide 40 mg PO daily  - Neurology follow-up

## 2022-02-17 NOTE — H&P ADULT - PROBLEM SELECTOR PLAN 6
Rate controlled on Metoprolol & Diltiazem CR, continue with hold parameters, in addition to Digoxin, already anticoagulated on Eliquis for recent left femoral DVT.

## 2022-02-17 NOTE — H&P ADULT - PROBLEM SELECTOR PLAN 2
mild, ML related to his poor PO intake over the past 2 days, received no IVF given his low EF, monitor I & O, repeat serum lactate level in am.

## 2022-02-17 NOTE — PROGRESS NOTE ADULT - SUBJECTIVE AND OBJECTIVE BOX
CC: Delirium in dementia.  Aggression towards caregivers.  Son reports that recently diagnosed Afib and started on digoxin about 3 weeks ago.  Since then has become more confused agitated and disorientated.    HPI:  This is a 89 y/o M with PMH of HTN, DM type 2 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 Fib, DVT on Eliquis, CKD stage 3, BPH, Malignant Melanoma, Basal Cell Carcinoma, Diverticulitis, Obesity, and Dementia who presented with AMS & agitation over the past 2 days, son reports longstanding dementia but rapid decline over the past month, was agitated & more confused over the past 2 days, son unable to take care of him & request placement. Patient is non communication, was agitated & received Ativan 0.5 mg IM X1, currently asleep but easily arousable, confused, then returns back to sleep. No further history could be obtained at this time.    (2022 05:59)    REVIEW OF SYSTEMS:    Patient denied fever, chills, abdominal pain, nausea, vomiting, cough, shortness of breath, chest pain or palpitations    Vital Signs Last 24 Hrs  T(C): 36.4 (2022 10:20), Max: 36.6 (2022 20:11)  T(F): 97.5 (2022 10:20), Max: 97.9 (2022 20:11)  HR: 98 (2022 10:20) (88 - 120)  BP: 151/84 (2022 10:20) (110/67 - 151/84)  BP(mean): 83 (2022 04:46) (83 - 83)  RR: 18 (2022 10:20) (18 - 20)  SpO2: 95% (2022 10:20) (93% - 97%)I&O's Summary    2022 07:01  -  2022 07:00  --------------------------------------------------------  IN: 100 mL / OUT: 0 mL / NET: 100 mL      PHYSICAL EXAM:  GENERAL: NAD,   HEENT: PERRL, +EOMI, anicteric, no [place]  NECK: Supple, No JVD   CHEST/LUNG: CTA bilaterally; Normal effort  HEART: S1S2 Normal intensity, no murmurs, gallops or rubs noted  ABDOMEN: Soft, BS Normoactive, NT, ND, no HSM noted  EXTREMITIES:  2+ radial and DP pulses noted, no clubbing, cyanosis, or edema noted, Limited mobility   SKIN: No rashes or lesions noted  NEURO: Lethargy delirium, dementia, no focal deficits noted, CN II-XII intact  PSYCH: Depression delirium  mood and affect; insight/judgement inappropriate  LABS:                        13.9   9.49  )-----------( 237      ( 2022 09:14 )             41.0     -    141  |  101  |  50<H>  ----------------------------<  244<H>  3.5   |  31  |  2.00<H>    Ca    8.4      2022 09:14    TPro  6.0  /  Alb  2.9<L>  /  TBili  0.5  /  DBili  x   /  AST  16  /  ALT  12  /  AlkPhos  171<H>  -17    PT/INR - ( 2022 20:57 )   PT: 19.1 sec;   INR: 1.61 ratio         PTT - ( 2022 20:57 )  PTT:41.0 sec  Urinalysis Basic - ( 2022 00:47 )    Color: Yellow / Appearance: Clear / S.010 / pH: x  Gluc: x / Ketone: Negative  / Bili: Negative / Urobili: Negative mg/dL   Blood: x / Protein: 15 mg/dL / Nitrite: Negative   Leuk Esterase: Negative / RBC: Negative /HPF / WBC 0-2   Sq Epi: x / Non Sq Epi: Occasional / Bacteria: Negative      RADIOLOGY & ADDITIONAL TESTS:    MEDICATIONS:  MEDICATIONS  (STANDING):  apixaban 2.5 milliGRAM(s) Oral two times a day  atorvastatin 20 milliGRAM(s) Oral at bedtime  dextrose 40% Gel 15 Gram(s) Oral once  dextrose 5%. 1000 milliLiter(s) (50 mL/Hr) IV Continuous <Continuous>  dextrose 5%. 1000 milliLiter(s) (100 mL/Hr) IV Continuous <Continuous>  dextrose 50% Injectable 25 Gram(s) IV Push once  dextrose 50% Injectable 12.5 Gram(s) IV Push once  dextrose 50% Injectable 25 Gram(s) IV Push once  digoxin     Tablet 125 MICROGram(s) Oral daily  diltiazem    milliGRAM(s) Oral daily  furosemide    Tablet 40 milliGRAM(s) Oral daily  gabapentin 100 milliGRAM(s) Oral daily  glucagon  Injectable 1 milliGRAM(s) IntraMuscular once  insulin glargine Injectable (LANTUS) 7 Unit(s) SubCutaneous every morning  insulin lispro (ADMELOG) corrective regimen sliding scale   SubCutaneous three times a day before meals  insulin lispro (ADMELOG) corrective regimen sliding scale   SubCutaneous at bedtime  lactobacillus acidophilus 1 Tablet(s) Oral two times a day  metoprolol tartrate 100 milliGRAM(s) Oral two times a day  mirtazapine 15 milliGRAM(s) Oral at bedtime  pantoprazole    Tablet 40 milliGRAM(s) Oral before breakfast  tamsulosin 0.4 milliGRAM(s) Oral at bedtime    MEDICATIONS  (PRN):

## 2022-02-17 NOTE — H&P ADULT - PROBLEM SELECTOR PLAN 3
random plasma glucose was 345 mg/dl on presentation, hold Januvia & Tradjenta, started on Lantus 7 units subcutaneously daily in addition to insulin Lispro on a sliding scale before meals & at bedtime. adjust the Lantus dose based on total daily insulin requirements. get Glycohemoglobin level in am.

## 2022-02-17 NOTE — CONSULT NOTE ADULT - SUBJECTIVE AND OBJECTIVE BOX
History of Present Illness: The patient is a 89 year old male with a history of HTN, HL, DM, CKD, carotid stenosis, DVT, atrial fibrillation, chronic systolic heart failure who presents with AMS. The patient is unable to provide any history to me. He had multiple recent admissions for atrial fibrillation and heart failure. His rate controlling medications had been maximized and included diltiazem, metoprolol, and digoxin. His rates were relatively well controlled on this regimen with HR in the  range. He was not a candidate for more invasive treatment (i.e. no DCCV or ablation). Over the past few weeks, he has had a decline of his mental status, although family had noted this even prior to his recent hospitalizations.    Past Medical/Surgical History:  HTN, HL, DM, CKD, carotid stenosis, DVT, atrial fibrillation, chronic systolic heart failure    Medications:  Home Medications:  atorvastatin 20 mg oral tablet: 1 tab(s) orally once a day (17 Feb 2022 05:47)  Flomax 0.4 mg oral capsule: 1 cap(s) orally once a day (17 Feb 2022 05:47)  gabapentin 100 mg oral capsule: 1 cap(s) orally once a day (17 Feb 2022 05:47)  Januvia 50 mg oral tablet: 1 tab(s) orally once a day (17 Feb 2022 05:47)  lactobacillus acidophilus oral capsule: 1 tab(s) orally 2 times a day (17 Feb 2022 05:47)  Myrbetriq 50 mg oral tablet, extended release: 1 tab(s) orally once a day (17 Feb 2022 05:47)  pantoprazole 40 mg oral delayed release tablet: 1 tab(s) orally once a day (before a meal) (17 Feb 2022 05:47)  Remeron: 15 milligram(s) orally once a day (at bedtime) (17 Feb 2022 05:47)  Tradjenta 5 mg oral tablet: 1 tab(s) orally once a day (17 Feb 2022 05:47)      Family History: Non-contributory family history of premature cardiovascular atherosclerotic disease    Social History: No tobacco, alcohol or drug use    Review of Systems:  Unable to obtain    Physical Exam:  Vitals:        Vital Signs Last 24 Hrs  T(C): 36.4 (17 Feb 2022 10:20), Max: 36.6 (16 Feb 2022 20:11)  T(F): 97.5 (17 Feb 2022 10:20), Max: 97.9 (16 Feb 2022 20:11)  HR: 98 (17 Feb 2022 10:20) (88 - 120)  BP: 151/84 (17 Feb 2022 10:20) (110/67 - 151/84)  BP(mean): 83 (17 Feb 2022 04:46) (83 - 83)  RR: 18 (17 Feb 2022 10:20) (18 - 20)  SpO2: 95% (17 Feb 2022 10:20) (93% - 97%)  General: NAD  HEENT: MMM  Neck: No JVD, no carotid bruit  Lungs: CTAB  CV: Irregular, nl S1/S2, no M/R/G  Abdomen: S/NT/ND, +BS  Extremities: No LE edema, no cyanosis  Neuro: AAOx0  Skin: No rash    Labs:                        13.9   9.49  )-----------( 237      ( 17 Feb 2022 09:14 )             41.0     02-17    141  |  101  |  50<H>  ----------------------------<  244<H>  3.5   |  31  |  2.00<H>    Ca    8.4      17 Feb 2022 09:14    TPro  6.0  /  Alb  2.9<L>  /  TBili  0.5  /  DBili  x   /  AST  16  /  ALT  12  /  AlkPhos  171<H>  02-17        PT/INR - ( 16 Feb 2022 20:57 )   PT: 19.1 sec;   INR: 1.61 ratio         PTT - ( 16 Feb 2022 20:57 )  PTT:41.0 sec    ECG: AF, nonspecific ST abnormality

## 2022-02-17 NOTE — PROGRESS NOTE ADULT - SUBJECTIVE AND OBJECTIVE BOX
AMS H/O DEMENTIA recently  started digoxin ? cause of increasing AMS if possible use alternate medication cardiology follow psych follow up  will check labs  spoke to son

## 2022-02-17 NOTE — CONSULT NOTE ADULT - SUBJECTIVE AND OBJECTIVE BOX
HPI:  This is a 89 y/o M with PMH of HTN, DM type 2 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 Fib, DVT on Eliquis, CKD stage 3, BPH, Malignant Melanoma, Basal Cell Carcinoma, Diverticulitis, Obesity, and Dementia who presented with AMS & agitation over the past 2 days, son reports longstanding dementia but rapid decline over the past month, was agitated & more confused over the past 2 days, son unable to take care of him & request placement. Patient is non communication, was agitated & received Ativan 0.5 mg IM X1, currently asleep but easily arousable, confused, then returns back to sleep. No further history could be obtained at this time.    (2022 05:59)    PERTINENT PM/SXH:   HLD (hyperlipidemia)    HTN (hypertension)    Carotid artery disorder    Osteoarthritis    Melanoma in situ    Type 2 diabetes mellitus without complication    History of basal cell carcinoma (BCC)    Chronic atrial fibrillation      Basal cell cancer    S/P lumbar laminectomy    Retinal detachment    Dupuytrens contracture      FAMILY HISTORY:  Family history of diabetes mellitus (Mother)      ITEMS NOT CHECKED ARE NOT PRESENT    SOCIAL HISTORY:   Significant other/partner[ ]  Children[x ]  Religious/Spirituality:  Substance hx:  [ ]   Tobacco hx:  [ ]   Alcohol hx: [ ]   (x) none   Home Opioid hx:  [ ] I-Stop Reference No:  Living Situation: [ x]Home  [ ]Long term care  [ ]Rehab [ ]Other    ADVANCE DIRECTIVES:    DNR  MOLST  [ ]  Living Will  [x ]   DECISION MAKER(s):  [x ] Health Care Proxy(s)  [ ] Surrogate(s)  [ ] Guardian           Name(s):  sabi Reyes   Phone Number(s):    BASELINE (I)ADL(s) (prior to admission):  [place]: [ ]Total  [ ] Moderate [x ]Dependent    Allergies    aspirin (Anaphylaxis)  penicillin (Rash)  strawberry (Rash)    Intolerances    MEDICATIONS  (STANDING):  apixaban 2.5 milliGRAM(s) Oral two times a day  atorvastatin 20 milliGRAM(s) Oral at bedtime  dextrose 40% Gel 15 Gram(s) Oral once  dextrose 5%. 1000 milliLiter(s) (50 mL/Hr) IV Continuous <Continuous>  dextrose 5%. 1000 milliLiter(s) (100 mL/Hr) IV Continuous <Continuous>  dextrose 50% Injectable 25 Gram(s) IV Push once  dextrose 50% Injectable 12.5 Gram(s) IV Push once  dextrose 50% Injectable 25 Gram(s) IV Push once  digoxin     Tablet 125 MICROGram(s) Oral daily  diltiazem    milliGRAM(s) Oral daily  furosemide    Tablet 40 milliGRAM(s) Oral daily  gabapentin 100 milliGRAM(s) Oral daily  glucagon  Injectable 1 milliGRAM(s) IntraMuscular once  insulin glargine Injectable (LANTUS) 7 Unit(s) SubCutaneous every morning  insulin lispro (ADMELOG) corrective regimen sliding scale   SubCutaneous three times a day before meals  insulin lispro (ADMELOG) corrective regimen sliding scale   SubCutaneous at bedtime  lactobacillus acidophilus 1 Tablet(s) Oral two times a day  metoprolol tartrate 100 milliGRAM(s) Oral two times a day  mirtazapine 15 milliGRAM(s) Oral at bedtime  pantoprazole    Tablet 40 milliGRAM(s) Oral before breakfast  tamsulosin 0.4 milliGRAM(s) Oral at bedtime    MEDICATIONS  (PRN):    PRESENT SYMPTOMS: [x ]Unable to obtain due to poor mentation   Source if other than patient:  [ ]Family   [ ]Team     Pain: [ ]yes [ ]no  QOL impact -   Location -                    Aggravating factors -  Quality -  Radiation -  Timing-  Severity (0-10 scale):  Minimal acceptable level (0-10 scale):     CPOT:    https://www.[facility].org/getattachment/[email or web address]/Critical-Care-Pain-Observation-Tool-(CPOT)      PAIN AD Score:     [email or web address].[email or web address]/[email or web address].pdf (press ctrl +  left click to view)    Dyspnea:                           [ ]Mild [ ]Moderate [ ]Severe  Anxiety:                             [ ]Mild [ ]Moderate [ ]Severe  Fatigue:                             [ ]Mild [ ]Moderate [ ]Severe  Nausea:                             [ ]Mild [ ]Moderate [ ]Severe  Loss of appetite:              [ ]Mild [ ]Moderate [ ]Severe  Constipation:                    [ ]Mild [ ]Moderate [ ]Severe    Other Symptoms:  [ ]All other review of systems negative     Palliative Performance Status Version 2:         40 %    http://npcrc.org/files/news/palliative_performance_scale_ppsv2.pdf  PHYSICAL EXAM:  Vital Signs Last 24 Hrs  T(C): 36.4 (2022 10:20), Max: 36.6 (2022 20:11)  T(F): 97.5 (2022 10:20), Max: 97.9 (2022 20:11)  HR: 98 (2022 10:20) (88 - 120)  BP: 151/84 (2022 10:20) (110/67 - 151/84)  BP(mean): 83 (2022 04:46) (83 - 83)  RR: 18 (2022 10:20) (18 - 20)  SpO2: 95% (2022 10:20) (93% - 97%) I&O's Summary    2022 07:01  -  2022 07:00  --------------------------------------------------------  IN: 100 mL / OUT: 0 mL / NET: 100 mL      LABS:                        13.9   9.49  )-----------( 237      ( 2022 09:14 )             41.0   02-17    141  |  101  |  50<H>  ----------------------------<  244<H>  3.5   |  31  |  2.00<H>    Ca    8.4      2022 09:14    TPro  6.0  /  Alb  2.9<L>  /  TBili  0.5  /  DBili  x   /  AST  16  /  ALT  12  /  AlkPhos  171<H>  02-17  PT/INR - ( 2022 20:57 )   PT: 19.1 sec;   INR: 1.61 ratio         PTT - ( 2022 20:57 )  PTT:41.0 sec    Urinalysis Basic - ( 2022 00:47 )    Color: Yellow / Appearance: Clear / S.010 / pH: x  Gluc: x / Ketone: Negative  / Bili: Negative / Urobili: Negative mg/dL   Blood: x / Protein: 15 mg/dL / Nitrite: Negative   Leuk Esterase: Negative / RBC: Negative /HPF / WBC 0-2   Sq Epi: x / Non Sq Epi: Occasional / Bacteria: Negative      RADIOLOGY & ADDITIONAL STUDIES: reviewed    PROTEIN CALORIE MALNUTRITION PRESENT: [ ]mild [ ]moderate [ ]severe [ ]underweight [ ]morbid obesity  https://www.andeal.org/vault/2440/web/files/ONC/Table_Clinical%20Characteristics%20to%20Document%20Malnutrition-White%20JV%20et%20al%135473.pdf    Height (cm): 170.2 (22 @ 04:46), 172.7 (22 @ 21:41), 172.7 (22 @ 12:14)  Weight (kg): 84.912 (22 @ 04:46), 101.2 (22 @ 21:41), 101.2 (22 @ :14)  BMI (kg/m2): 29.3 (22 @ 04:46), 33.9 (22 @ 21:41), 33.9 (22 @ 12:14)    [ ]PPSV2 < or = to 30% [ ]significant weight loss  [ ]poor nutritional intake  [ ]anasarca      [ ]Artificial Nutrition      REFERRALS:   [ ]Chaplaincy  [ ]Hospice  [ ]Child Life  [ ]Social Work  [ ]Case management [ ]Holistic Therapy     Goals of Care Document:  HPI:  This is a 89 y/o M with PMH of HTN, DM type 2 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 Fib, DVT on Eliquis, CKD stage 3, BPH, Malignant Melanoma, Basal Cell Carcinoma, Diverticulitis, Obesity, and Dementia who presented with AMS & agitation over the past 2 days, son reports longstanding dementia but rapid decline over the past month, was agitated & more confused over the past 2 days, son unable to take care of him & request placement. Patient is non communication, was agitated & received Ativan 0.5 mg IM X1, currently asleep but easily arousable, confused, then returns back to sleep. No further history could be obtained at this time.    (2022 05:59)    PERTINENT PM/SXH:   HLD (hyperlipidemia)    HTN (hypertension)    Carotid artery disorder    Osteoarthritis    Melanoma in situ    Type 2 diabetes mellitus without complication    History of basal cell carcinoma (BCC)    Chronic atrial fibrillation      Basal cell cancer    S/P lumbar laminectomy    Retinal detachment    Dupuytrens contracture      FAMILY HISTORY:  Family history of diabetes mellitus (Mother)      ITEMS NOT CHECKED ARE NOT PRESENT    SOCIAL HISTORY:   Significant other/partner[ ]  Children[x ]  Yazidism/Spirituality:  Substance hx:  [ ]   Tobacco hx:  [ ]   Alcohol hx: [ ]   (x) none   Home Opioid hx:  [ ] I-Stop Reference No:  Living Situation: [ x]Home  [ ]Long term care  [ ]Rehab [ ]Other    ADVANCE DIRECTIVES:    DNR  MOLST  [ ]  Living Will  [x ]   DECISION MAKER(s):  [x ] Health Care Proxy(s)  [ ] Surrogate(s)  [ ] Guardian           Name(s):  sabi Reyes   Phone Number(s):    BASELINE (I)ADL(s) (prior to admission):  [place]: [ ]Total  [ ] Moderate [x ]Dependent    Allergies    aspirin (Anaphylaxis)  penicillin (Rash)  strawberry (Rash)    Intolerances    MEDICATIONS  (STANDING):  apixaban 2.5 milliGRAM(s) Oral two times a day  atorvastatin 20 milliGRAM(s) Oral at bedtime  dextrose 40% Gel 15 Gram(s) Oral once  dextrose 5%. 1000 milliLiter(s) (50 mL/Hr) IV Continuous <Continuous>  dextrose 5%. 1000 milliLiter(s) (100 mL/Hr) IV Continuous <Continuous>  dextrose 50% Injectable 25 Gram(s) IV Push once  dextrose 50% Injectable 12.5 Gram(s) IV Push once  dextrose 50% Injectable 25 Gram(s) IV Push once  digoxin     Tablet 125 MICROGram(s) Oral daily  diltiazem    milliGRAM(s) Oral daily  furosemide    Tablet 40 milliGRAM(s) Oral daily  gabapentin 100 milliGRAM(s) Oral daily  glucagon  Injectable 1 milliGRAM(s) IntraMuscular once  insulin glargine Injectable (LANTUS) 7 Unit(s) SubCutaneous every morning  insulin lispro (ADMELOG) corrective regimen sliding scale   SubCutaneous three times a day before meals  insulin lispro (ADMELOG) corrective regimen sliding scale   SubCutaneous at bedtime  lactobacillus acidophilus 1 Tablet(s) Oral two times a day  metoprolol tartrate 100 milliGRAM(s) Oral two times a day  mirtazapine 15 milliGRAM(s) Oral at bedtime  pantoprazole    Tablet 40 milliGRAM(s) Oral before breakfast  tamsulosin 0.4 milliGRAM(s) Oral at bedtime    MEDICATIONS  (PRN):    PRESENT SYMPTOMS: [x ]Unable to obtain due to poor mentation   Source if other than patient:  [ ]Family   [ ]Team     Pain: [ ]yes [ ]no  QOL impact -   Location -                    Aggravating factors -  Quality -  Radiation -  Timing-  Severity (0-10 scale):  Minimal acceptable level (0-10 scale):     CPOT:    https://www.[facility].org/getattachment/[email or web address]/Critical-Care-Pain-Observation-Tool-(CPOT)      PAIN AD Score:     [email or web address].[email or web address]/[email or web address] (press ctrl +  left click to view)    Dyspnea:                           [ ]Mild [ ]Moderate [ ]Severe  Anxiety:                             [ ]Mild [ ]Moderate [ ]Severe  Fatigue:                             [ ]Mild [ ]Moderate [ ]Severe  Nausea:                             [ ]Mild [ ]Moderate [ ]Severe  Loss of appetite:              [ ]Mild [ ]Moderate [ ]Severe  Constipation:                    [ ]Mild [ ]Moderate [ ]Severe    Other Symptoms:  [ ]All other review of systems negative     Palliative Performance Status Version 2:         40 %    http://npcrc.org/files/news/palliative_performance_scale_ppsv2.pdf  PHYSICAL EXAM:  Vital Signs Last 24 Hrs  T(C): 36.4 (2022 10:20), Max: 36.6 (2022 20:11)  T(F): 97.5 (2022 10:20), Max: 97.9 (2022 20:11)  HR: 98 (2022 10:20) (88 - 120)  BP: 151/84 (2022 10:20) (110/67 - 151/84)  BP(mean): 83 (2022 04:46) (83 - 83)  RR: 18 (2022 10:20) (18 - 20)  SpO2: 95% (2022 10:20) (93% - 97%) I&O's Summary    2022 07:01  -  2022 07:00  --------------------------------------------------------  IN: 100 mL / OUT: 0 mL / NET: 100 mL    GENERAL: NAD,   HEENT: PERRL, +EOMI, anicteric, no [place]  NECK: Supple, No JVD   CHEST/LUNG: CTA bilaterally; Normal effort  HEART: S1S2 Normal intensity, no murmurs, gallops or rubs noted  ABDOMEN: Soft, BS Normoactive, NT, ND, no HSM noted  EXTREMITIES:  2+ radial and DP pulses noted, no clubbing, cyanosis, or edema noted, Limited mobility   SKIN: No rashes or lesions noted  NEURO: Lethargy delirium, dementia, no focal deficits noted, CN II-XII intact  PSYCH: Depression delirium  mood and affect; insight/judgement inappropriate    LABS:                        13.9   9.49  )-----------( 237      ( 2022 09:14 )             41.0   02-17    141  |  101  |  50<H>  ----------------------------<  244<H>  3.5   |  31  |  2.00<H>    Ca    8.4      2022 09:14    TPro  6.0  /  Alb  2.9<L>  /  TBili  0.5  /  DBili  x   /  AST  16  /  ALT  12  /  AlkPhos  171<H>    PT/INR - ( 2022 20:57 )   PT: 19.1 sec;   INR: 1.61 ratio         PTT - ( 2022 20:57 )  PTT:41.0 sec    Urinalysis Basic - ( 2022 00:47 )    Color: Yellow / Appearance: Clear / S.010 / pH: x  Gluc: x / Ketone: Negative  / Bili: Negative / Urobili: Negative mg/dL   Blood: x / Protein: 15 mg/dL / Nitrite: Negative   Leuk Esterase: Negative / RBC: Negative /HPF / WBC 0-2   Sq Epi: x / Non Sq Epi: Occasional / Bacteria: Negative      RADIOLOGY & ADDITIONAL STUDIES: reviewed    PROTEIN CALORIE MALNUTRITION PRESENT: [ ]mild [ ]moderate [ ]severe [ ]underweight [ ]morbid obesity  https://www.andeal.org/vault/2440/web/files/ONC/Table_Clinical%20Characteristics%20to%20Document%20Malnutrition-White%20JV%20et%20al%2020.pdf    Height (cm): 170.2 (22 @ 04:46), 172.7 (22 @ 21:41), 172.7 (22 @ 12:14)  Weight (kg): 84.912 (22 @ 04:46), 101.2 (22 @ 21:41), 101.2 (22 @ 12:14)  BMI (kg/m2): 29.3 (22 @ 04:46), 33.9 (22 @ 21:41), 33.9 (22 @ 12:14)    [ ]PPSV2 < or = to 30% [ ]significant weight loss  [ ]poor nutritional intake  [ ]anasarca      [ ]Artificial Nutrition      REFERRALS:   [ ]Chaplaincy  [x ]Hospice  [ ]Child Life  [ ]Social Work  [ ]Case management [ ]Holistic Therapy     Goals of Care Document:

## 2022-02-17 NOTE — H&P ADULT - NSHPLABSRESULTS_GEN_ALL_CORE
-        Lactate Trend   @ 22:53 Lactate:2.5    @ 20:57 Lactate:2.5                           13.7   9.41  )-----------( 248      ( 2022 20:57 )             41.4                136  |  97  |  55<H>  ----------------------------<  345<H>  4.2   |  28  |  2.29<H>    Ca    8.6      2022 20:57    TPro  6.5  /  Alb  3.0<L>  /  TBili  0.5  /  DBili  x   /  AST  15  /  ALT  15  /  AlkPhos  182<H>            Urinalysis Basic - ( 2022 00:47 )    Color: Yellow / Appearance: Clear / S.010 / pH: x  Gluc: x / Ketone: Negative  / Bili: Negative / Urobili: Negative mg/dL   Blood: x / Protein: 15 mg/dL / Nitrite: Negative   Leuk Esterase: Negative / RBC: Negative /HPF / WBC 0-2   Sq Epi: x / Non Sq Epi: Occasional / Bacteria: Negative      PT/INR - ( 2022 20:57 )   PT: 19.1 sec;   INR: 1.61 ratio    PTT - ( 2022 20:57 )  PTT:41.0 sec        COVID-19 PCR: NotDetec (2022 20:57)  COVID-19 PCR: NotDetec (2022 22:03)  COVID-19 PCR: NotDetec (2022 13:01)  COVID-19 PCR: NotDetec (22 Dec 2021 14:08)  SARS-CoV-2: NotDetec (19 Dec 2021 20:46)      Serum Pro-Brain Natriuretic Peptide (22 @ 20:57)   Serum Pro-Brain Natriuretic Peptide: 5635 pg/mL       Digoxin Level, Serum (22 @ 22:53)   Digoxin Level, Serum: 1.5 ng/mL       `CT BRAIN                        PROCEDURE DATE:  2022    IMPRESSION:  1)  involutional changes with volume loss. Mild chronic ischemic changes.   No acute abnormality or hemorrhage suggested.  2)  clear sinuses and mastoids..          CXR:    As per my review shows poor inspiratory effort, mild pulmonary venous congestion, normal cardiac shadow size, no pulmonary infiltrates, pleural effusion, or pneumothorax. Pending official report.           EKG:    As per my review shows A. Fib with CVR at 90/min, LAD (- 60), normal QTc interval, normal QRS voltage & duration, with late transition, nonspecific ST-T abnormality.      -

## 2022-02-17 NOTE — H&P ADULT - PROBLEM SELECTOR PLAN 1
ML worsening of his dementia, CT brain without contrast was negative, no focal neurological deficit noted or reported by family, but confused, "praying" all the time when calm, reported to have progressive decline over the past month, agitated over the past 2 days & son unable to handle him, responded well to Ativan 0.5 mg IM, admitted to medicine, constant observation for safety, continue bedtime Seroquel, ativan IM PRN, neurology consult with Dr. Nguyen was called, may consider psych consult when cleared by neurology, case management &  consult for placement as requested by son.

## 2022-02-17 NOTE — PATIENT PROFILE ADULT - NSPROPOAPRESSUREINJURYCT_GEN_A_NUR
1 Crescentic Advancement Flap Text: The defect edges were debeveled with a #15 scalpel blade.  Given the location of the defect and the proximity to free margins a crescentic advancement flap was deemed most appropriate.  Using a sterile surgical marker, the appropriate advancement flap was drawn incorporating the defect and placing the expected incisions within the relaxed skin tension lines where possible.    The area thus outlined was incised deep to adipose tissue with a #15 scalpel blade.  The skin margins were undermined to an appropriate distance in all directions utilizing iris scissors.

## 2022-02-17 NOTE — PATIENT PROFILE ADULT - STATED REASON FOR ADMISSION
From home aide noticed sudden altered mental status. Delusional for 36 hours per daughter. Detach from reality.

## 2022-02-17 NOTE — H&P ADULT - ASSESSMENT
89 y/o M with PMH of HTN, DM type 2 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 Fib, DVT on Eliquis, CKD stage 3, BPH, Malignant Melanoma, Basal Cell Carcinoma, Diverticulitis, Obesity, and Dementia presented with AMS & agitation.

## 2022-02-18 LAB
AMMONIA BLD-MCNC: 19 UMOL/L — SIGNIFICANT CHANGE UP (ref 11–32)
CULTURE RESULTS: SIGNIFICANT CHANGE UP
FOLATE SERPL-MCNC: 17.9 NG/ML — SIGNIFICANT CHANGE UP
SPECIMEN SOURCE: SIGNIFICANT CHANGE UP
TSH SERPL-MCNC: 1.51 UIU/ML — SIGNIFICANT CHANGE UP (ref 0.27–4.2)
VIT B12 SERPL-MCNC: 429 PG/ML — SIGNIFICANT CHANGE UP (ref 232–1245)

## 2022-02-18 PROCEDURE — 99231 SBSQ HOSP IP/OBS SF/LOW 25: CPT

## 2022-02-18 PROCEDURE — 99232 SBSQ HOSP IP/OBS MODERATE 35: CPT

## 2022-02-18 RX ORDER — RISPERIDONE 4 MG/1
0.25 TABLET ORAL
Refills: 0 | Status: DISCONTINUED | OUTPATIENT
Start: 2022-02-18 | End: 2022-02-18

## 2022-02-18 RX ORDER — INSULIN GLARGINE 100 [IU]/ML
10 INJECTION, SOLUTION SUBCUTANEOUS EVERY MORNING
Refills: 0 | Status: DISCONTINUED | OUTPATIENT
Start: 2022-02-18 | End: 2022-02-21

## 2022-02-18 RX ORDER — OLANZAPINE 15 MG/1
5 TABLET, FILM COATED ORAL EVERY 6 HOURS
Refills: 0 | Status: DISCONTINUED | OUTPATIENT
Start: 2022-02-18 | End: 2022-02-26

## 2022-02-18 RX ORDER — RISPERIDONE 4 MG/1
0.5 TABLET ORAL THREE TIMES A DAY
Refills: 0 | Status: DISCONTINUED | OUTPATIENT
Start: 2022-02-18 | End: 2022-02-20

## 2022-02-18 RX ORDER — HALOPERIDOL DECANOATE 100 MG/ML
2.5 INJECTION INTRAMUSCULAR EVERY 6 HOURS
Refills: 0 | Status: DISCONTINUED | OUTPATIENT
Start: 2022-02-18 | End: 2022-02-22

## 2022-02-18 RX ADMIN — HALOPERIDOL DECANOATE 2.5 MILLIGRAM(S): 100 INJECTION INTRAMUSCULAR at 09:34

## 2022-02-18 RX ADMIN — Medication 4: at 12:59

## 2022-02-18 RX ADMIN — RISPERIDONE 0.5 MILLIGRAM(S): 4 TABLET ORAL at 22:10

## 2022-02-18 RX ADMIN — PANTOPRAZOLE SODIUM 40 MILLIGRAM(S): 20 TABLET, DELAYED RELEASE ORAL at 05:16

## 2022-02-18 RX ADMIN — RISPERIDONE 0.5 MILLIGRAM(S): 4 TABLET ORAL at 13:40

## 2022-02-18 RX ADMIN — APIXABAN 2.5 MILLIGRAM(S): 2.5 TABLET, FILM COATED ORAL at 08:29

## 2022-02-18 RX ADMIN — HALOPERIDOL DECANOATE 2.5 MILLIGRAM(S): 100 INJECTION INTRAMUSCULAR at 22:58

## 2022-02-18 RX ADMIN — ATORVASTATIN CALCIUM 20 MILLIGRAM(S): 80 TABLET, FILM COATED ORAL at 22:09

## 2022-02-18 RX ADMIN — Medication 2: at 17:31

## 2022-02-18 RX ADMIN — Medication 1 TABLET(S): at 17:31

## 2022-02-18 RX ADMIN — Medication 100 MILLIGRAM(S): at 17:31

## 2022-02-18 RX ADMIN — INSULIN GLARGINE 10 UNIT(S): 100 INJECTION, SOLUTION SUBCUTANEOUS at 08:41

## 2022-02-18 RX ADMIN — MIRTAZAPINE 15 MILLIGRAM(S): 45 TABLET, ORALLY DISINTEGRATING ORAL at 22:09

## 2022-02-18 RX ADMIN — Medication 40 MILLIGRAM(S): at 05:15

## 2022-02-18 RX ADMIN — Medication 0.5 MILLIGRAM(S): at 02:21

## 2022-02-18 RX ADMIN — Medication 100 MILLIGRAM(S): at 05:15

## 2022-02-18 RX ADMIN — Medication 1 TABLET(S): at 05:15

## 2022-02-18 RX ADMIN — Medication 125 MICROGRAM(S): at 05:15

## 2022-02-18 RX ADMIN — Medication 6: at 08:40

## 2022-02-18 RX ADMIN — Medication 240 MILLIGRAM(S): at 05:15

## 2022-02-18 RX ADMIN — TAMSULOSIN HYDROCHLORIDE 0.4 MILLIGRAM(S): 0.4 CAPSULE ORAL at 22:09

## 2022-02-18 RX ADMIN — APIXABAN 2.5 MILLIGRAM(S): 2.5 TABLET, FILM COATED ORAL at 22:10

## 2022-02-18 NOTE — SWALLOW BEDSIDE ASSESSMENT ADULT - SWALLOW EVAL: DIAGNOSIS
Mild-moderate oral dysphagia with puree, moderately thick and mildly thick liquids marked by overall reduced attention to bolus requiring redirection and suspected posterior loss.  Pharyngeal stage notable for suspected delayed swallow trigger, reduced laryngeal stage upon palpation, +wet vocal quality after mildly thick liquids suggestive of penetration, no overt s/s aspiration after puree and moderately thick liquids.  Overall reduced cognition significantly impacting swallow profile.

## 2022-02-18 NOTE — BH CONSULTATION LIAISON ASSESSMENT NOTE - NSBHCHARTREVIEWVS_PSY_A_CORE FT
Vital Signs Last 24 Hrs  T(C): 36.8 (18 Feb 2022 10:11), Max: 36.8 (18 Feb 2022 05:00)  T(F): 98.2 (18 Feb 2022 10:11), Max: 98.3 (18 Feb 2022 05:00)  HR: 90 (18 Feb 2022 10:11) (90 - 110)  BP: 125/74 (18 Feb 2022 10:11) (117/61 - 145/83)  BP(mean): --  RR: 18 (18 Feb 2022 10:11) (17 - 18)  SpO2: 92% (18 Feb 2022 05:00) (92% - 92%)

## 2022-02-18 NOTE — PROGRESS NOTE ADULT - ASSESSMENT
89 y/o M with PMH of HTN, DM type 2 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 Fib, DVT on Eliquis, CKD stage 3, BPH, Malignant Melanoma, Basal Cell Carcinoma, Diverticulitis, Obesity, and Dementia presented with AMS & agitation.        Problem/Plan - 1:  ·  Problem: AMS (altered mental status).   ·  Plan: ML worsening of his dementia, CT brain without contrast was negative, no focal neurological deficit noted or reported by family, but confused, "praying" all the time when calm, reported to have progressive decline over the past month, agitated over the past 2 days & son unable to handle him, responded well to Ativan 0.5 mg IM,  Remeron, Risperidone, Haldol prn for agitations  neurology consult Dr. Nguyen  Psych dr Blair .  Palliative care consult -Dr Majano discussed with Son Sabino.  For hospice care at home.      Problem/Plan - 2:  ·  Problem: DM (diabetes mellitus), type 2, uncontrolled. A1c 7.1  ·  Plan: random plasma glucose was 345 mg/dl on presentation, holding Januvia & Tradjenta    Continue Insulin sliding scale, Insulin lantus .       Problem/Plan - 3  ·  Problem: Chronic systolic congestive heart failure.   ·  Plan: stable, continue PO Lasix 40mg po daily     Metoprolol 100mg po bid     Hold/DC  Digoxin for now , for probable but unlikely cause of worsening delirium and encephalopathy.     Cardiology consult dr Beny Iglesias doubt that at the low dose, digoxin is very unlikely to be associated with encephalopathy.  Patient has had declining mental and functional status prior to starting digoxin.    Will restart digoxin if heart rate goes up .       Problem/Plan - 5:  ·  Problem: History of DVT in adulthood.   ·  Plan: continue on renal dose Eliquis.     Problem/Plan - 6:  ·  Problem: Chronic atrial fibrillation.   ·  Plan: Rate controlled on Metoprolol & Diltiazem CR,   Continue on Eliquis for recent left femoral DVT.     Problem/Plan - 7:  ·  Problem: Stage 3 chronic kidney disease.   ·  Plan: ML HARESH on top of stage 3 CKD, monitor renal indices, avoid nephrotoxins & renally dose meds.     Problem/Plan - 8:  ·  Problem: BPH without urinary obstruction.   ·  Plan: Continue  Flomax     Problem/Plan - 9:  ·  Problem: Need for prophylactic measure.   ·  Plan: High risk for recurrent VTE, already anticoagulated on Eliquis for recent left femoral DVT, continue on renal dose.    Disposition:  Palliative care dr Majano discussed with son Sabino.  For hospice at  home. Supportive care .  DNR/DNI

## 2022-02-18 NOTE — SWALLOW BEDSIDE ASSESSMENT ADULT - COMMENTS
Chart reviewed order received for swallow eval.  Pt seen this afternoon for initial assessment received upright in bed A&A Ox0, reduced cognition, reduced command following, calm and cooperative for assessment, pain scale 0/10 pre & post eval.  Swallow eval completed see below for details.  Pt left as received NAD ALLIE Givens & Dr. Jiang notified.  Will follow x1 to check PO tolerance as schedule permits.    As per charting, pt is a "89 y/o M with PMH of HTN, DM type 2 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 Fib, DVT on Eliquis, CKD stage 3, BPH, Malignant Melanoma, Basal Cell Carcinoma, Diverticulitis, Obesity, and Dementia presented with AMS & agitation."    CT Head 2/16/22: "IMPRESSION:  1)  involutional changes with volume loss. Mild chronic ischemic changes.   No acute abnormality or hemorrhage suggested.  2)  clear sinuses and mastoids.."     Chest xray 2/16/22: "IMPRESSION:  No acute radiographic findings and no change"

## 2022-02-18 NOTE — SWALLOW BEDSIDE ASSESSMENT ADULT - SLP GENERAL OBSERVATIONS
Pt received upright in bed A&A Ox0, reduced cognition, reduced command following, calm and cooperative for assessment, pain scale 0/10 pre & post eval

## 2022-02-18 NOTE — ED ADULT NURSE NOTE - RECENT EXPOSURE TO
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  Pt was sitting in chair and did not fall today.  Pt fell on icy pavement last night with+hitting head and no LOC.  Hx of cranial surgery for cranial stenosis age 9 months without problems since then.    
Pt arrives following a mechanical slip and fall on ice last night while walking outside. Pt felt fine after the fall, woke up this morning with posterior neck pain. Today while at work, pt began feeling fatigued, warm, nauseated and had syncopal episode while sitting. Other teachers at patient's job reported approx 1 min of LOC. Denies dizziness.   
none known

## 2022-02-18 NOTE — PROGRESS NOTE ADULT - SUBJECTIVE AND OBJECTIVE BOX
CC: Delirium in dementia.  Having agitations, trying to climb out of bed.    HPI:  This is a 89 y/o M with PMH of HTN, DM type 2 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 Fib, DVT on Eliquis, CKD stage 3, BPH, Malignant Melanoma, Basal Cell Carcinoma, Diverticulitis, Obesity, and Dementia who presented with AMS & agitation over the past 2 days, son reports longstanding dementia but rapid decline over the past month, was agitated & more confused over the past 2 days, son unable to take care of him & request placement. Patient is non communication, was agitated & received Ativan 0.5 mg IM X1, currently asleep but easily arousable, confused, then returns back to sleep. No further history could be obtained at this time.    (2022 05:59)    REVIEW OF SYSTEMS:    Patient denied fever, chills, abdominal pain, nausea, vomiting, cough, shortness of breath, chest pain or palpitations    Vital Signs Last 24 Hrs  T(C): 36.8 (2022 10:11), Max: 36.8 (2022 05:00)  T(F): 98.2 (2022 10:11), Max: 98.3 (2022 05:00)  HR: 90 (2022 10:11) (90 - 110)  BP: 125/74 (2022 10:11) (117/61 - 145/83)  BP(mean): --  RR: 18 (2022 10:11) (17 - 18)  SpO2: 92% (2022 05:00) (92% - 92%)I&O's Summary    2022 07:01  -  2022 07:00  --------------------------------------------------------  IN: 250 mL / OUT: 502 mL / NET: -252 mL      PHYSICAL EXAM:  GENERAL: NAD,  HEENT: PERRL, +EOMI, anicteric, no [place]  NECK: Supple, No JVD   CHEST/LUNG: CTA bilaterally; Normal effort  HEART: S1S2 Normal intensity, no murmurs, gallops or rubs noted  ABDOMEN: Soft, BS Normoactive, NT, ND, no HSM noted  EXTREMITIES:  2+ radial and DP pulses noted, no clubbing, cyanosis, or edema noted, Limited mobility   SKIN: No rashes or lesions noted  NEURO: Confusion, lethargy . No focal deficits noted, CN II-XII intact  PSYCH: Delirium depressed mood and affect; insight/judgement inappropriate  LABS:                        13.9   9.49  )-----------( 237      ( 2022 09:14 )             41.0         141  |  101  |  50<H>  ----------------------------<  244<H>  3.5   |  31  |  2.00<H>    Ca    8.4      2022 09:14    TPro  6.0  /  Alb  2.9<L>  /  TBili  0.5  /  DBili  x   /  AST  16  /  ALT  12  /  AlkPhos  171<H>      PT/INR - ( 2022 20:57 )   PT: 19.1 sec;   INR: 1.61 ratio         PTT - ( 2022 20:57 )  PTT:41.0 sec  Urinalysis Basic - ( 2022 00:47 )    Color: Yellow / Appearance: Clear / S.010 / pH: x  Gluc: x / Ketone: Negative  / Bili: Negative / Urobili: Negative mg/dL   Blood: x / Protein: 15 mg/dL / Nitrite: Negative   Leuk Esterase: Negative / RBC: Negative /HPF / WBC 0-2   Sq Epi: x / Non Sq Epi: Occasional / Bacteria: Negative      RADIOLOGY & ADDITIONAL TESTS:    MEDICATIONS:  MEDICATIONS  (STANDING):  apixaban 2.5 milliGRAM(s) Oral two times a day  atorvastatin 20 milliGRAM(s) Oral at bedtime  dextrose 40% Gel 15 Gram(s) Oral once  dextrose 5%. 1000 milliLiter(s) (50 mL/Hr) IV Continuous <Continuous>  dextrose 5%. 1000 milliLiter(s) (100 mL/Hr) IV Continuous <Continuous>  dextrose 50% Injectable 25 Gram(s) IV Push once  dextrose 50% Injectable 12.5 Gram(s) IV Push once  dextrose 50% Injectable 25 Gram(s) IV Push once  digoxin     Tablet 125 MICROGram(s) Oral daily  diltiazem    milliGRAM(s) Oral daily  furosemide    Tablet 40 milliGRAM(s) Oral daily  gabapentin 100 milliGRAM(s) Oral daily  glucagon  Injectable 1 milliGRAM(s) IntraMuscular once  insulin glargine Injectable (LANTUS) 10 Unit(s) SubCutaneous every morning  insulin lispro (ADMELOG) corrective regimen sliding scale   SubCutaneous three times a day before meals  insulin lispro (ADMELOG) corrective regimen sliding scale   SubCutaneous at bedtime  lactobacillus acidophilus 1 Tablet(s) Oral two times a day  metoprolol tartrate 100 milliGRAM(s) Oral two times a day  mirtazapine 15 milliGRAM(s) Oral at bedtime  pantoprazole    Tablet 40 milliGRAM(s) Oral before breakfast  risperiDONE   Tablet 0.25 milliGRAM(s) Oral two times a day  tamsulosin 0.4 milliGRAM(s) Oral at bedtime    MEDICATIONS  (PRN):  haloperidol    Injectable 2.5 milliGRAM(s) IntraMuscular every 6 hours PRN Agitation

## 2022-02-18 NOTE — SWALLOW BEDSIDE ASSESSMENT ADULT - ASR SWALLOW ASPIRATION MONITOR
If any s/s aspiration noted, d/c PO & initiate NPO with speech to reassess at bedside/change of breathing pattern/oral hygiene/position upright (90Y)/cough/gurgly voice/fever/pneumonia/throat clearing/upper respiratory infection

## 2022-02-18 NOTE — PROGRESS NOTE ADULT - SUBJECTIVE AND OBJECTIVE BOX
Chief Complaint: AMS    Interval Events: No events overnight.    Review of Systems:  General: No fevers, chills, weight gain  Skin: No rashes, color changes  Cardiovascular: No chest pain, orthopnea  Respiratory: No shortness of breath, cough  Gastrointestinal: No nausea, abdominal pain  Genitourinary: No incontinence, pain with urination  Musculoskeletal: No pain, swelling, decreased range of motion  Neurological: No headache, weakness  Psychiatric: No depression, anxiety  Endocrine: No weight gain, increased thirst  All other systems are comprehensively negative.    Physical Exam:  Vitals:        Vital Signs Last 24 Hrs  T(C): 36.8 (18 Feb 2022 05:00), Max: 36.8 (18 Feb 2022 05:00)  T(F): 98.3 (18 Feb 2022 05:00), Max: 98.3 (18 Feb 2022 05:00)  HR: 110 (18 Feb 2022 05:00) (92 - 110)  BP: 117/61 (18 Feb 2022 05:00) (117/61 - 151/84)  BP(mean): --  RR: 18 (18 Feb 2022 05:00) (17 - 18)  SpO2: 92% (18 Feb 2022 05:00) (92% - 95%)  General: NAD  HEENT: MMM  Neck: No JVD, no carotid bruit  Lungs: CTAB  CV: RRR, nl S1/S2, no M/R/G  Abdomen: S/NT/ND, +BS  Extremities: No LE edema, no cyanosis  Neuro: AAOx1  Skin: No rash    Labs:                        13.9   9.49  )-----------( 237      ( 17 Feb 2022 09:14 )             41.0     02-17    141  |  101  |  50<H>  ----------------------------<  244<H>  3.5   |  31  |  2.00<H>    Ca    8.4      17 Feb 2022 09:14    TPro  6.0  /  Alb  2.9<L>  /  TBili  0.5  /  DBili  x   /  AST  16  /  ALT  12  /  AlkPhos  171<H>  02-17        PT/INR - ( 16 Feb 2022 20:57 )   PT: 19.1 sec;   INR: 1.61 ratio         PTT - ( 16 Feb 2022 20:57 )  PTT:41.0 sec    Telemetry: AF

## 2022-02-18 NOTE — BH CONSULTATION LIAISON ASSESSMENT NOTE - CURRENT MEDICATION
MEDICATIONS  (STANDING):  apixaban 2.5 milliGRAM(s) Oral two times a day  atorvastatin 20 milliGRAM(s) Oral at bedtime  dextrose 40% Gel 15 Gram(s) Oral once  dextrose 5%. 1000 milliLiter(s) (50 mL/Hr) IV Continuous <Continuous>  dextrose 5%. 1000 milliLiter(s) (100 mL/Hr) IV Continuous <Continuous>  dextrose 50% Injectable 25 Gram(s) IV Push once  dextrose 50% Injectable 12.5 Gram(s) IV Push once  dextrose 50% Injectable 25 Gram(s) IV Push once  digoxin     Tablet 125 MICROGram(s) Oral daily  diltiazem    milliGRAM(s) Oral daily  furosemide    Tablet 40 milliGRAM(s) Oral daily  gabapentin 100 milliGRAM(s) Oral daily  glucagon  Injectable 1 milliGRAM(s) IntraMuscular once  insulin glargine Injectable (LANTUS) 10 Unit(s) SubCutaneous every morning  insulin lispro (ADMELOG) corrective regimen sliding scale   SubCutaneous three times a day before meals  insulin lispro (ADMELOG) corrective regimen sliding scale   SubCutaneous at bedtime  lactobacillus acidophilus 1 Tablet(s) Oral two times a day  metoprolol tartrate 100 milliGRAM(s) Oral two times a day  mirtazapine 15 milliGRAM(s) Oral at bedtime  pantoprazole    Tablet 40 milliGRAM(s) Oral before breakfast  risperiDONE   Tablet 0.25 milliGRAM(s) Oral two times a day  tamsulosin 0.4 milliGRAM(s) Oral at bedtime    MEDICATIONS  (PRN):  haloperidol    Injectable 2.5 milliGRAM(s) IntraMuscular every 6 hours PRN Agitation

## 2022-02-18 NOTE — PROGRESS NOTE ADULT - SUBJECTIVE AND OBJECTIVE BOX
Neurology follow up note    SAJI LEHMAN89yMale      Interval History:    Patient awake in bed     Allergies    aspirin (Anaphylaxis)  penicillin (Rash)  strawberry (Rash)    Intolerances        MEDICATIONS    apixaban 2.5 milliGRAM(s) Oral two times a day  atorvastatin 20 milliGRAM(s) Oral at bedtime  dextrose 40% Gel 15 Gram(s) Oral once  dextrose 5%. 1000 milliLiter(s) IV Continuous <Continuous>  dextrose 5%. 1000 milliLiter(s) IV Continuous <Continuous>  dextrose 50% Injectable 25 Gram(s) IV Push once  dextrose 50% Injectable 12.5 Gram(s) IV Push once  dextrose 50% Injectable 25 Gram(s) IV Push once  digoxin     Tablet 125 MICROGram(s) Oral daily  diltiazem    milliGRAM(s) Oral daily  furosemide    Tablet 40 milliGRAM(s) Oral daily  gabapentin 100 milliGRAM(s) Oral daily  glucagon  Injectable 1 milliGRAM(s) IntraMuscular once  haloperidol    Injectable 2.5 milliGRAM(s) IntraMuscular every 6 hours PRN  insulin glargine Injectable (LANTUS) 10 Unit(s) SubCutaneous every morning  insulin lispro (ADMELOG) corrective regimen sliding scale   SubCutaneous three times a day before meals  insulin lispro (ADMELOG) corrective regimen sliding scale   SubCutaneous at bedtime  lactobacillus acidophilus 1 Tablet(s) Oral two times a day  metoprolol tartrate 100 milliGRAM(s) Oral two times a day  mirtazapine 15 milliGRAM(s) Oral at bedtime  OLANZapine Injectable 5 milliGRAM(s) IntraMuscular every 6 hours PRN  pantoprazole    Tablet 40 milliGRAM(s) Oral before breakfast  risperiDONE   Tablet 0.5 milliGRAM(s) Oral three times a day  tamsulosin 0.4 milliGRAM(s) Oral at bedtime              Vital Signs Last 24 Hrs  T(C): 36.8 (2022 10:11), Max: 36.8 (2022 05:00)  T(F): 98.2 (2022 10:11), Max: 98.3 (2022 05:00)  HR: 90 (2022 10:11) (90 - 110)  BP: 125/74 (2022 10:11) (117/61 - 145/83)  BP(mean): --  RR: 18 (2022 10:11) (17 - 18)  SpO2: 92% (2022 05:00) (92% - 92%)      REVIEW OF SYSTEMS:  Could not be obtained from the patient secondary to the patient being altered.    PHYSICAL EXAMINATION:   HEENT:  Head:  Normocephalic and atraumatic.  Eyes:  No scleral icterus.  Ears:  Hard to evaluate secondary to the patient being altered.  NECK:  Had increased tone, but is resisting.  CARDIOVASCULAR:  S1 and S2 heard.  RESPIRATORY:  Decreased breath sounds bilaterally, gives poor effort.  ABDOMEN:  Soft and nontender.  EXTREMITIES:  No clubbing or cyanosis were noted.      NEUROLOGIC:  The patient was awake in bed  To verbal stimuli, positive eye opening, on and off blink to visual threat. speech mumbles    Motor:  I attempted to elevate bilateral upper extremities, the patient would actively resist, pull both arms down, would say 4/5.  Bilateral lower extremities, slight flexion at the hip and knee, but does have significantly increased tone.  The patient is mostly sedentary, nonambulatory.            LABS:  CBC Full  -  ( 2022 09:14 )  WBC Count : 9.49 K/uL  RBC Count : 4.55 M/uL  Hemoglobin : 13.9 g/dL  Hematocrit : 41.0 %  Platelet Count - Automated : 237 K/uL  Mean Cell Volume : 90.1 fl  Mean Cell Hemoglobin : 30.5 pg  Mean Cell Hemoglobin Concentration : 33.9 gm/dL  Auto Neutrophil # : 6.15 K/uL  Auto Lymphocyte # : 2.00 K/uL  Auto Monocyte # : 0.91 K/uL  Auto Eosinophil # : 0.30 K/uL  Auto Basophil # : 0.04 K/uL  Auto Neutrophil % : 64.8 %  Auto Lymphocyte % : 21.1 %  Auto Monocyte % : 9.6 %  Auto Eosinophil % : 3.2 %  Auto Basophil % : 0.4 %    Urinalysis Basic - ( 2022 00:47 )    Color: Yellow / Appearance: Clear / S.010 / pH: x  Gluc: x / Ketone: Negative  / Bili: Negative / Urobili: Negative mg/dL   Blood: x / Protein: 15 mg/dL / Nitrite: Negative   Leuk Esterase: Negative / RBC: Negative /HPF / WBC 0-2   Sq Epi: x / Non Sq Epi: Occasional / Bacteria: Negative          141  |  101  |  50<H>  ----------------------------<  244<H>  3.5   |  31  |  2.00<H>    Ca    8.4      2022 09:14    TPro  6.0  /  Alb  2.9<L>  /  TBili  0.5  /  DBili  x   /  AST  16  /  ALT  12  /  AlkPhos  171<H>  02-17    Hemoglobin A1C:     LIVER FUNCTIONS - ( 2022 09:14 )  Alb: 2.9 g/dL / Pro: 6.0 g/dL / ALK PHOS: 171 U/L / ALT: 12 U/L DA / AST: 16 U/L / GGT: x           Vitamin B12   PT/INR - ( 2022 20:57 )   PT: 19.1 sec;   INR: 1.61 ratio         PTT - ( 2022 20:57 )  PTT:41.0 sec      RADIOLOGY      ANALYSIS AND PLAN:  A 89-year-old with episode of altered mental status.  For episode of altered mental status, questionable this could be metabolic encephalopathy from recent addition of digoxin cardiology noted since not toxic digoxin not playing a role in behavior  .  Examination is severely limited, but I do not see any new clear sign to suggest a new cerebrovascular accident has ensued.  For history of dementia, secondary to the patient's age, supportive therapy.  Atrial fibrillation, anticoagulation as needed.  Hypertension, monitor systolic blood pressure.  Hyperlipidemia, continue the patient on statin.  For diabetes, strict control of blood sugars.  Spoke with son, Sabino, who says that he would be the primary contact, his telephone number is [phone].  Psychiatry followup as needed medication adjusted   more awake today     Greater than 40 minutes of time was spent with the patient, plan of care, reviewing data, and speaking to multidisciplinary healthcare team with greater than 50% of that time in counseling and care coordination.    Thank you for the courtesy of this consultation.   Neurology follow up note    SAJI LEHMAN89yMale      Interval History:    Patient awake in bed     Allergies    aspirin (Anaphylaxis)  penicillin (Rash)  strawberry (Rash)    Intolerances        MEDICATIONS    apixaban 2.5 milliGRAM(s) Oral two times a day  atorvastatin 20 milliGRAM(s) Oral at bedtime  dextrose 40% Gel 15 Gram(s) Oral once  dextrose 5%. 1000 milliLiter(s) IV Continuous <Continuous>  dextrose 5%. 1000 milliLiter(s) IV Continuous <Continuous>  dextrose 50% Injectable 25 Gram(s) IV Push once  dextrose 50% Injectable 12.5 Gram(s) IV Push once  dextrose 50% Injectable 25 Gram(s) IV Push once  digoxin     Tablet 125 MICROGram(s) Oral daily  diltiazem    milliGRAM(s) Oral daily  furosemide    Tablet 40 milliGRAM(s) Oral daily  gabapentin 100 milliGRAM(s) Oral daily  glucagon  Injectable 1 milliGRAM(s) IntraMuscular once  haloperidol    Injectable 2.5 milliGRAM(s) IntraMuscular every 6 hours PRN  insulin glargine Injectable (LANTUS) 10 Unit(s) SubCutaneous every morning  insulin lispro (ADMELOG) corrective regimen sliding scale   SubCutaneous three times a day before meals  insulin lispro (ADMELOG) corrective regimen sliding scale   SubCutaneous at bedtime  lactobacillus acidophilus 1 Tablet(s) Oral two times a day  metoprolol tartrate 100 milliGRAM(s) Oral two times a day  mirtazapine 15 milliGRAM(s) Oral at bedtime  OLANZapine Injectable 5 milliGRAM(s) IntraMuscular every 6 hours PRN  pantoprazole    Tablet 40 milliGRAM(s) Oral before breakfast  risperiDONE   Tablet 0.5 milliGRAM(s) Oral three times a day  tamsulosin 0.4 milliGRAM(s) Oral at bedtime              Vital Signs Last 24 Hrs  T(C): 36.8 (2022 10:11), Max: 36.8 (2022 05:00)  T(F): 98.2 (2022 10:11), Max: 98.3 (2022 05:00)  HR: 90 (2022 10:11) (90 - 110)  BP: 125/74 (2022 10:11) (117/61 - 145/83)  BP(mean): --  RR: 18 (2022 10:11) (17 - 18)  SpO2: 92% (2022 05:00) (92% - 92%)      REVIEW OF SYSTEMS:  Could not be obtained from the patient secondary to the patient being altered.    PHYSICAL EXAMINATION:   HEENT:  Head:  Normocephalic and atraumatic.  Eyes:  No scleral icterus.  Ears:  Hard to evaluate secondary to the patient being altered.  NECK:  Had increased tone, but is resisting.  CARDIOVASCULAR:  S1 and S2 heard.  RESPIRATORY:  Decreased breath sounds bilaterally, gives poor effort.  ABDOMEN:  Soft and nontender.  EXTREMITIES:  No clubbing or cyanosis were noted.      NEUROLOGIC:  The patient was awake in bed  To verbal stimuli, positive eye opening, on and off blink to visual threat. speech mumbles    Motor:  I attempted to elevate bilateral upper extremities, the patient would actively resist, pull both arms down, would say 4/5.  Bilateral lower extremities, slight flexion at the hip and knee, but does have significantly increased tone.  The patient is mostly sedentary, nonambulatory.            LABS:  CBC Full  -  ( 2022 09:14 )  WBC Count : 9.49 K/uL  RBC Count : 4.55 M/uL  Hemoglobin : 13.9 g/dL  Hematocrit : 41.0 %  Platelet Count - Automated : 237 K/uL  Mean Cell Volume : 90.1 fl  Mean Cell Hemoglobin : 30.5 pg  Mean Cell Hemoglobin Concentration : 33.9 gm/dL  Auto Neutrophil # : 6.15 K/uL  Auto Lymphocyte # : 2.00 K/uL  Auto Monocyte # : 0.91 K/uL  Auto Eosinophil # : 0.30 K/uL  Auto Basophil # : 0.04 K/uL  Auto Neutrophil % : 64.8 %  Auto Lymphocyte % : 21.1 %  Auto Monocyte % : 9.6 %  Auto Eosinophil % : 3.2 %  Auto Basophil % : 0.4 %    Urinalysis Basic - ( 2022 00:47 )    Color: Yellow / Appearance: Clear / S.010 / pH: x  Gluc: x / Ketone: Negative  / Bili: Negative / Urobili: Negative mg/dL   Blood: x / Protein: 15 mg/dL / Nitrite: Negative   Leuk Esterase: Negative / RBC: Negative /HPF / WBC 0-2   Sq Epi: x / Non Sq Epi: Occasional / Bacteria: Negative          141  |  101  |  50<H>  ----------------------------<  244<H>  3.5   |  31  |  2.00<H>    Ca    8.4      2022 09:14    TPro  6.0  /  Alb  2.9<L>  /  TBili  0.5  /  DBili  x   /  AST  16  /  ALT  12  /  AlkPhos  171<H>  02-17    Hemoglobin A1C:     LIVER FUNCTIONS - ( 2022 09:14 )  Alb: 2.9 g/dL / Pro: 6.0 g/dL / ALK PHOS: 171 U/L / ALT: 12 U/L DA / AST: 16 U/L / GGT: x           Vitamin B12   PT/INR - ( 2022 20:57 )   PT: 19.1 sec;   INR: 1.61 ratio         PTT - ( 2022 20:57 )  PTT:41.0 sec      RADIOLOGY      ANALYSIS AND PLAN:  A 89-year-old with episode of altered mental status.  For episode of altered mental status, questionable this could be metabolic encephalopathy from recent addition of digoxin cardiology noted since not toxic digoxin not playing a role in behavior  .  Examination is severely limited, but I do not see any new clear sign to suggest a new cerebrovascular accident has ensued.  For history of dementia, secondary to the patient's age, supportive therapy.  Atrial fibrillation, anticoagulation as needed.  Hypertension, monitor systolic blood pressure.  Hyperlipidemia, continue the patient on statin.  For diabetes, strict control of blood sugars.  Spoke with son, Sabino, who says that he would be the primary contact, his telephone number is [phone] spoke to son today   Psychiatry followup as needed medication adjusted   more awake today     Greater than 40 minutes of time was spent with the patient, plan of care, reviewing data, and speaking to multidisciplinary healthcare team with greater than 50% of that time in counseling and care coordination.    Thank you for the courtesy of this consultation.

## 2022-02-18 NOTE — SWALLOW BEDSIDE ASSESSMENT ADULT - PHARYNGEAL PHASE
Delayed pharyngeal swallow/Decreased laryngeal elevation/Wet vocal quality post oral intake Delayed pharyngeal swallow/Decreased laryngeal elevation

## 2022-02-18 NOTE — BH CONSULTATION LIAISON ASSESSMENT NOTE - HPI (INCLUDE ILLNESS QUALITY, SEVERITY, DURATION, TIMING, CONTEXT, MODIFYING FACTORS, ASSOCIATED SIGNS AND SYMPTOMS)
Patient seen, evaluated and chart reviewed. Patient is a 89 y/o male with pmhx of HTN, DM2, HLD, PAD, CKD stage 3, melanoma, diverticulitis, chf, a-fib, DVT on eliquis presents with ams. per son, xochilt, pt has had increased agitation over the past few days. per son pt with severe mental decline over the past month. pt seen by geriatric psychiatrist today who advised that pt should seek ed eval. pt unable to give information due to confusion. Patient presents with delirium and is very difficult to redirect at this time.

## 2022-02-18 NOTE — DIETITIAN INITIAL EVALUATION ADULT. - PERTINENT MEDS FT
MEDICATIONS  (STANDING):  apixaban 2.5 milliGRAM(s) Oral two times a day  atorvastatin 20 milliGRAM(s) Oral at bedtime  dextrose 40% Gel 15 Gram(s) Oral once  dextrose 5%. 1000 milliLiter(s) (50 mL/Hr) IV Continuous <Continuous>  dextrose 5%. 1000 milliLiter(s) (100 mL/Hr) IV Continuous <Continuous>  dextrose 50% Injectable 25 Gram(s) IV Push once  dextrose 50% Injectable 12.5 Gram(s) IV Push once  dextrose 50% Injectable 25 Gram(s) IV Push once  digoxin     Tablet 125 MICROGram(s) Oral daily  diltiazem    milliGRAM(s) Oral daily  furosemide    Tablet 40 milliGRAM(s) Oral daily  gabapentin 100 milliGRAM(s) Oral daily  glucagon  Injectable 1 milliGRAM(s) IntraMuscular once  insulin glargine Injectable (LANTUS) 10 Unit(s) SubCutaneous every morning  insulin lispro (ADMELOG) corrective regimen sliding scale   SubCutaneous three times a day before meals  insulin lispro (ADMELOG) corrective regimen sliding scale   SubCutaneous at bedtime  lactobacillus acidophilus 1 Tablet(s) Oral two times a day  metoprolol tartrate 100 milliGRAM(s) Oral two times a day  mirtazapine 15 milliGRAM(s) Oral at bedtime  pantoprazole    Tablet 40 milliGRAM(s) Oral before breakfast  risperiDONE   Tablet 0.5 milliGRAM(s) Oral three times a day  tamsulosin 0.4 milliGRAM(s) Oral at bedtime    MEDICATIONS  (PRN):  haloperidol    Injectable 2.5 milliGRAM(s) IntraMuscular every 6 hours PRN Agitation  OLANZapine Injectable 5 milliGRAM(s) IntraMuscular every 6 hours PRN Acute agitation

## 2022-02-18 NOTE — DIETITIAN INITIAL EVALUATION ADULT. - OTHER INFO
Pt is a "89 y/o M with PMH of HTN, DM type 2 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 Fib, DVT on Eliquis, CKD stage 3, BPH, Malignant Melanoma, Basal Cell Carcinoma, Diverticulitis, Obesity, and Dementia who presented with AMS & agitation over the past 2 days, son reports longstanding dementia but rapid decline over the past month, was agitated & more confused over the past 2 days, son unable to take care of him & request placement. Patient is non communication, was agitated & received Ativan 0.5 mg IM X1, currently asleep but easily arousable, confused, then returns back to sleep."    Pt seen for nutrition assessment secondary to chronic CHF and > A1c (8.7%).  Diet initiated to regular consistency (con CHO, DASH/TLC diet), however, was downgraded to pureed consistency given presentation (admitted with worsening dementia and general decline).  Pt seen for swallowing evaluation today - recommending pureed consistency with moderately thick liquids.  Pt had private live-in aide PTA; noted palliative care following and family interested in home hospice upon discharge.  Pt requires full assistance at mealtimes, per documentation, intake variable 0-100%; consumed 25-50% of breakfast today.  Pt sleeping at time of visit.  Strawberry allergy noted.  Will provide magic cup fortified ice cream bid for additional kcal/protein and to supplement suspected variable po intake (290kcal, 9g protein).  Skin: stage I to sacrum.  Diet education (heart failure, DM, modified consistency) not appropriate secondary to hx dementia/prognosis- plan for home hospice.  RD to follow-up and will continue to monitor pt's nutrition status.

## 2022-02-19 LAB
ALBUMIN SERPL ELPH-MCNC: 2.9 G/DL — LOW (ref 3.3–5)
ALP SERPL-CCNC: 167 U/L — HIGH (ref 30–120)
ALT FLD-CCNC: 16 U/L DA — SIGNIFICANT CHANGE UP (ref 10–60)
ANION GAP SERPL CALC-SCNC: 9 MMOL/L — SIGNIFICANT CHANGE UP (ref 5–17)
AST SERPL-CCNC: 18 U/L — SIGNIFICANT CHANGE UP (ref 10–40)
BILIRUB SERPL-MCNC: 1.1 MG/DL — SIGNIFICANT CHANGE UP (ref 0.2–1.2)
BUN SERPL-MCNC: 51 MG/DL — HIGH (ref 7–23)
CALCIUM SERPL-MCNC: 8.6 MG/DL — SIGNIFICANT CHANGE UP (ref 8.4–10.5)
CHLORIDE SERPL-SCNC: 101 MMOL/L — SIGNIFICANT CHANGE UP (ref 96–108)
CO2 SERPL-SCNC: 33 MMOL/L — HIGH (ref 22–31)
CREAT SERPL-MCNC: 2.26 MG/DL — HIGH (ref 0.5–1.3)
GLUCOSE SERPL-MCNC: 232 MG/DL — HIGH (ref 70–99)
HCT VFR BLD CALC: 43.2 % — SIGNIFICANT CHANGE UP (ref 39–50)
HGB BLD-MCNC: 14.3 G/DL — SIGNIFICANT CHANGE UP (ref 13–17)
MCHC RBC-ENTMCNC: 30.2 PG — SIGNIFICANT CHANGE UP (ref 27–34)
MCHC RBC-ENTMCNC: 33.1 GM/DL — SIGNIFICANT CHANGE UP (ref 32–36)
MCV RBC AUTO: 91.3 FL — SIGNIFICANT CHANGE UP (ref 80–100)
NRBC # BLD: 0 /100 WBCS — SIGNIFICANT CHANGE UP (ref 0–0)
PLATELET # BLD AUTO: 247 K/UL — SIGNIFICANT CHANGE UP (ref 150–400)
POTASSIUM SERPL-MCNC: 3.7 MMOL/L — SIGNIFICANT CHANGE UP (ref 3.5–5.3)
POTASSIUM SERPL-SCNC: 3.7 MMOL/L — SIGNIFICANT CHANGE UP (ref 3.5–5.3)
PROT SERPL-MCNC: 6.6 G/DL — SIGNIFICANT CHANGE UP (ref 6–8.3)
RBC # BLD: 4.73 M/UL — SIGNIFICANT CHANGE UP (ref 4.2–5.8)
RBC # FLD: 12.9 % — SIGNIFICANT CHANGE UP (ref 10.3–14.5)
SODIUM SERPL-SCNC: 143 MMOL/L — SIGNIFICANT CHANGE UP (ref 135–145)
WBC # BLD: 11.64 K/UL — HIGH (ref 3.8–10.5)
WBC # FLD AUTO: 11.64 K/UL — HIGH (ref 3.8–10.5)

## 2022-02-19 RX ORDER — METOPROLOL TARTRATE 50 MG
100 TABLET ORAL
Refills: 0 | Status: DISCONTINUED | OUTPATIENT
Start: 2022-02-19 | End: 2022-02-26

## 2022-02-19 RX ADMIN — Medication 100 MILLIGRAM(S): at 19:18

## 2022-02-19 RX ADMIN — HALOPERIDOL DECANOATE 2.5 MILLIGRAM(S): 100 INJECTION INTRAMUSCULAR at 21:52

## 2022-02-19 RX ADMIN — INSULIN GLARGINE 10 UNIT(S): 100 INJECTION, SOLUTION SUBCUTANEOUS at 08:26

## 2022-02-19 RX ADMIN — APIXABAN 2.5 MILLIGRAM(S): 2.5 TABLET, FILM COATED ORAL at 21:46

## 2022-02-19 RX ADMIN — Medication 1 TABLET(S): at 19:18

## 2022-02-19 RX ADMIN — Medication 40 MILLIGRAM(S): at 05:30

## 2022-02-19 RX ADMIN — TAMSULOSIN HYDROCHLORIDE 0.4 MILLIGRAM(S): 0.4 CAPSULE ORAL at 21:46

## 2022-02-19 RX ADMIN — Medication 4: at 08:25

## 2022-02-19 RX ADMIN — RISPERIDONE 0.5 MILLIGRAM(S): 4 TABLET ORAL at 12:54

## 2022-02-19 RX ADMIN — MIRTAZAPINE 15 MILLIGRAM(S): 45 TABLET, ORALLY DISINTEGRATING ORAL at 21:46

## 2022-02-19 RX ADMIN — HALOPERIDOL DECANOATE 2.5 MILLIGRAM(S): 100 INJECTION INTRAMUSCULAR at 15:05

## 2022-02-19 RX ADMIN — RISPERIDONE 0.5 MILLIGRAM(S): 4 TABLET ORAL at 05:31

## 2022-02-19 RX ADMIN — PANTOPRAZOLE SODIUM 40 MILLIGRAM(S): 20 TABLET, DELAYED RELEASE ORAL at 06:31

## 2022-02-19 RX ADMIN — Medication 240 MILLIGRAM(S): at 05:30

## 2022-02-19 RX ADMIN — Medication 1 TABLET(S): at 05:31

## 2022-02-19 RX ADMIN — Medication 4: at 12:31

## 2022-02-19 RX ADMIN — RISPERIDONE 0.5 MILLIGRAM(S): 4 TABLET ORAL at 21:46

## 2022-02-19 RX ADMIN — Medication 4: at 17:23

## 2022-02-19 RX ADMIN — ATORVASTATIN CALCIUM 20 MILLIGRAM(S): 80 TABLET, FILM COATED ORAL at 21:46

## 2022-02-19 RX ADMIN — APIXABAN 2.5 MILLIGRAM(S): 2.5 TABLET, FILM COATED ORAL at 08:27

## 2022-02-19 RX ADMIN — GABAPENTIN 100 MILLIGRAM(S): 400 CAPSULE ORAL at 12:54

## 2022-02-19 RX ADMIN — Medication 125 MICROGRAM(S): at 05:30

## 2022-02-19 NOTE — PROGRESS NOTE ADULT - SUBJECTIVE AND OBJECTIVE BOX
Patient is a 89y old  Male who presents with a chief complaint of AMS (19 Feb 2022 06:05)      INTERVAL HPI/OVERNIGHT EVENTS:  Patient seen and examined in am, very hard of hearing, denies pain, SOB, confused, does not know where he is, explained with phone to text that he is in the hospital. Voiced understanding.  ROS limited by mental status      MEDICATIONS  (STANDING):  apixaban 2.5 milliGRAM(s) Oral two times a day  atorvastatin 20 milliGRAM(s) Oral at bedtime  dextrose 40% Gel 15 Gram(s) Oral once  dextrose 5%. 1000 milliLiter(s) (50 mL/Hr) IV Continuous <Continuous>  dextrose 5%. 1000 milliLiter(s) (100 mL/Hr) IV Continuous <Continuous>  dextrose 50% Injectable 25 Gram(s) IV Push once  dextrose 50% Injectable 12.5 Gram(s) IV Push once  dextrose 50% Injectable 25 Gram(s) IV Push once  digoxin     Tablet 125 MICROGram(s) Oral daily  diltiazem    milliGRAM(s) Oral daily  furosemide    Tablet 40 milliGRAM(s) Oral daily  gabapentin 100 milliGRAM(s) Oral daily  glucagon  Injectable 1 milliGRAM(s) IntraMuscular once  insulin glargine Injectable (LANTUS) 10 Unit(s) SubCutaneous every morning  insulin lispro (ADMELOG) corrective regimen sliding scale   SubCutaneous three times a day before meals  insulin lispro (ADMELOG) corrective regimen sliding scale   SubCutaneous at bedtime  lactobacillus acidophilus 1 Tablet(s) Oral two times a day  metoprolol tartrate 100 milliGRAM(s) Oral two times a day  mirtazapine 15 milliGRAM(s) Oral at bedtime  pantoprazole    Tablet 40 milliGRAM(s) Oral before breakfast  [email or web address]   Tablet 0.5 milliGRAM(s) Oral three times a day  tamsulosin 0.4 milliGRAM(s) Oral at bedtime    MEDICATIONS  (PRN):  haloperidol    Injectable 2.5 milliGRAM(s) IntraMuscular every 6 hours PRN Agitation  OLANZapine Injectable 5 milliGRAM(s) IntraMuscular every 6 hours PRN Acute agitation      Allergies    aspirin (Anaphylaxis)  penicillin (Rash)  strawberry (Rash)    Intolerances        Vital Signs Last 24 Hrs  T(C): 36.3 (19 Feb 2022 10:42), Max: 37.1 (18 Feb 2022 17:36)  T(F): 97.4 (19 Feb 2022 10:42), Max: 98.7 (18 Feb 2022 17:36)  HR: 90 (19 Feb 2022 10:42) (90 - 120)  BP: 111/75 (19 Feb 2022 10:42) (107/70 - 128/66)  BP(mean): --  RR: 19 (19 Feb 2022 10:42) (17 - 25)  SpO2: 94% (19 Feb 2022 10:42) (93% - 98%)    PHYSICAL EXAM:  GENERAL: NAD, elderly,  HEAD:  Atraumatic, Normocephalic  EYES: EOMI, conjunctiva and sclera clear  ENMT: Moist mucous membranes,   NECK: Supple, No JVD   NERVOUS SYSTEM:  Alert & Oriented X1 moves all extremities, extremely [place], follows simple commands, confused  CHEST/LUNG: Clear to auscultation bilaterally; No rales, rhonchi, wheezing, or rubs  HEART: Regular rate and rhythm; No murmurs  ABDOMEN: Soft, Nontender, Nondistended; Bowel sounds present  EXTREMITIES:  + Peripheral Pulses, No clubbing, cyanosis, or edema  LYMPH: No lymphadenopathy noted  SKIN: No rashes or lesions    LABS:                        14.3   11.64 )-----------( 247      ( 19 Feb 2022 07:50 )             43.2     19 Feb 2022 07:50    143    |  101    |  51     ----------------------------<  232    3.7     |  33     |  2.26     Ca    8.6        19 Feb 2022 07:50    TPro  6.6    /  Alb  2.9    /  TBili  1.1    /  DBili  x      /  AST  18     /  ALT  16     /  AlkPhos  167    19 Feb 2022 07:50        CAPILLARY BLOOD GLUCOSE      POCT Blood Glucose.: 241 mg/dL (19 Feb 2022 08:05)  POCT Blood Glucose.: 196 mg/dL (18 Feb 2022 21:19)  POCT Blood Glucose.: 193 mg/dL (18 Feb 2022 16:56)  POCT Blood Glucose.: 212 mg/dL (18 Feb 2022 12:01)      89 y/o M with PMH of HTN, DM type 2 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 Fib, DVT on Eliquis, CKD stage 3, BPH, Malignant Melanoma, Basal Cell Carcinoma, Diverticulitis, Obesity, and Dementia presented with AMS & agitation.        Problem/Plan - 1:  ·  Delirium superimposed on dementia. intermittent.  ·  Plan: ML worsening of his dementia, CT brain without contrast was negative, no focal neurological deficit noted or reported by family, but confused, "praying" all the time when calm, reported to have progressive decline over the past month, agitated over the past 2 days & son  Remeron, Risperidone, Haldol prn for agitations  Palliative care consult -Dr Majano discussed with Kirill Gallo.  For hospice care at home.      Problem/Plan - 2:  ·  Problem: DM (diabetes mellitus), type 2, uncontrolled. A1c 7.1  ·  Plan:  holding Januvia & Tradjenta    Continue Insulin sliding scale, Insulin lantus .  acceptable loose control given age and comorbidities. can resume home medications on DC     Problem/Plan - 3  ·  Problem: Chronic systolic congestive heart failure.   ·  Plan: stable, continue PO Lasix 40mg po daily     Metoprolol 100mg po bid  - held today, stopping parameter given worsening HR today.   - restarted on digoxin     Problem/Plan - 5:  ·  Problem: History of DVT in adulthood.   ·  Plan: continue on renal dose Eliquis.     Problem/Plan - 6:  ·  Problem: Chronic atrial fibrillation.   ·  Plan: Rate controlled on Metoprolol & Diltiazem CR,   Continue on Eliquis for recent left femoral DVT.     Problem/Plan - 7:  ·  Problem: Stage 3 chronic kidney disease.   ·  Plan: ML HARESH on top of stage 3 CKD, monitor renal indices, avoid nephrotoxins & renally dose meds.     Problem/Plan - 8:  ·  Problem: BPH without urinary obstruction.   ·  Plan: Continue  Flomax     Problem/Plan - 9:  ·  Problem: Need for prophylactic measure.   ·  Plan: High risk for recurrent VTE, already anticoagulated on Eliquis for recent left femoral DVT, continue on renal dose.    Disposition:  Palliative care dr Majano discussed with kirill Gallo.  For hospice at  home. Supportive care .  DNR/DNI

## 2022-02-19 NOTE — PROGRESS NOTE ADULT - SUBJECTIVE AND OBJECTIVE BOX
Chief Complaint: AMS    Interval Events: No events overnight.    Review of Systems:  General: No fevers, chills, weight gain  Skin: No rashes, color changes  Cardiovascular: No chest pain, orthopnea  Respiratory: No shortness of breath, cough  Gastrointestinal: No nausea, abdominal pain  Genitourinary: No incontinence, pain with urination  Musculoskeletal: No pain, swelling, decreased range of motion  Neurological: No headache, weakness  Psychiatric: No depression, anxiety  Endocrine: No weight gain, increased thirst  All other systems are comprehensively negative.    Physical Exam:  Vital Signs Last 24 Hrs  T(C): 36.9 (19 Feb 2022 04:49), Max: 37.1 (18 Feb 2022 17:36)  T(F): 98.4 (19 Feb 2022 04:49), Max: 98.7 (18 Feb 2022 17:36)  HR: 120 (19 Feb 2022 05:28) (100 - 120)  BP: 113/64 (19 Feb 2022 05:28) (107/70 - 128/66)  BP(mean): --  RR: 25 (19 Feb 2022 05:28) (17 - 25)  SpO2: 96% (19 Feb 2022 05:28) (93% - 98%)  General: NAD  HEENT: MMM  Neck: No JVD, no carotid bruit  Lungs: CTAB  CV: RRR, nl S1/S2, no M/R/G  Abdomen: S/NT/ND, +BS  Extremities: No LE edema, no cyanosis  Neuro: AAOx1  Skin: No rash    Labs:             02-19    143  |  101  |  51<H>  ----------------------------<  232<H>  3.7   |  33<H>  |  2.26<H>    Ca    8.6      19 Feb 2022 07:50    TPro  6.6  /  Alb  2.9<L>  /  TBili  1.1  /  DBili  x   /  AST  18  /  ALT  16  /  AlkPhos  167<H>  02-19                        14.3   11.64 )-----------( 247      ( 19 Feb 2022 07:50 )             43.2       Telemetry: AF

## 2022-02-19 NOTE — PROGRESS NOTE ADULT - ASSESSMENT
The patient is a 89 year old male with a history of HTN, HL, DM, CKD, carotid stenosis, DVT, atrial fibrillation, chronic systolic heart failure who presents with AMS.    Plan:  - Digoxin use is a rare cause of delirium in the doses used today and would be associated with digoxin toxicity. The patient's digoxin level was within normal limits. Other etiologies may more likely explain the patient's delirium, especially in a patient with multiple recent hospitalizations and mental decline prior to this.  - Continue metoprolol tartrate 100 mg bid  - Continue diltiazem  mg daily  - Can hold digoxin if needed, although expect patient to become tachycardia  - Continue furosemide 40 mg PO daily  - Neurology follow-up  - Remains confused/agitated at times

## 2022-02-19 NOTE — PROGRESS NOTE ADULT - SUBJECTIVE AND OBJECTIVE BOX
Neurology follow up note    SAJI LEHMAN89yMale      Interval History:    Patient resting in bed     Allergies    aspirin (Anaphylaxis)  penicillin (Rash)  strawberry (Rash)    Intolerances        MEDICATIONS    apixaban 2.5 milliGRAM(s) Oral two times a day  atorvastatin 20 milliGRAM(s) Oral at bedtime  dextrose 40% Gel 15 Gram(s) Oral once  dextrose 5%. 1000 milliLiter(s) IV Continuous <Continuous>  dextrose 5%. 1000 milliLiter(s) IV Continuous <Continuous>  dextrose 50% Injectable 25 Gram(s) IV Push once  dextrose 50% Injectable 12.5 Gram(s) IV Push once  dextrose 50% Injectable 25 Gram(s) IV Push once  digoxin     Tablet 125 MICROGram(s) Oral daily  diltiazem    milliGRAM(s) Oral daily  furosemide    Tablet 40 milliGRAM(s) Oral daily  gabapentin 100 milliGRAM(s) Oral daily  glucagon  Injectable 1 milliGRAM(s) IntraMuscular once  haloperidol    Injectable 2.5 milliGRAM(s) IntraMuscular every 6 hours PRN  insulin glargine Injectable (LANTUS) 10 Unit(s) SubCutaneous every morning  insulin lispro (ADMELOG) corrective regimen sliding scale   SubCutaneous three times a day before meals  insulin lispro (ADMELOG) corrective regimen sliding scale   SubCutaneous at bedtime  lactobacillus acidophilus 1 Tablet(s) Oral two times a day  metoprolol tartrate 100 milliGRAM(s) Oral two times a day  mirtazapine 15 milliGRAM(s) Oral at bedtime  OLANZapine Injectable 5 milliGRAM(s) IntraMuscular every 6 hours PRN  pantoprazole    Tablet 40 milliGRAM(s) Oral before breakfast  risperiDONE   Tablet 0.5 milliGRAM(s) Oral three times a day  tamsulosin 0.4 milliGRAM(s) Oral at bedtime              Vital Signs Last 24 Hrs  T(C): 36.9 (19 Feb 2022 04:49), Max: 37.1 (18 Feb 2022 17:36)  T(F): 98.4 (19 Feb 2022 04:49), Max: 98.7 (18 Feb 2022 17:36)  HR: 120 (19 Feb 2022 05:28) (90 - 120)  BP: 113/64 (19 Feb 2022 05:28) (107/70 - 128/66)  BP(mean): --  RR: 25 (19 Feb 2022 05:28) (17 - 25)  SpO2: 96% (19 Feb 2022 05:28) (93% - 98%)    REVIEW OF SYSTEMS:  Could not be obtained from the patient secondary to the patient being altered.    PHYSICAL EXAMINATION:   HEENT:  Head:  Normocephalic and atraumatic.  Eyes:  No scleral icterus.  Ears:  Hard to evaluate secondary to the patient being altered.  NECK:  Had increased tone, but is resisting.  CARDIOVASCULAR:  S1 and S2 heard.  RESPIRATORY:  Decreased breath sounds bilaterally, gives poor effort.  ABDOMEN:  Soft and nontender.  EXTREMITIES:  No clubbing or cyanosis were noted.      NEUROLOGIC:  The patient was awake in bed  To verbal stimuli, positive eye opening, on and off blink to visual threat. speech mumbles    Motor:  I attempted to elevate bilateral upper extremities, the patient would actively resist, pull both arms down, would say 4/5.  Bilateral lower extremities, slight flexion at the hip and knee, but does have significantly increased tone.  The patient is mostly sedentary, nonambulatory.                 LABS:  CBC Full  -  ( 17 Feb 2022 09:14 )  WBC Count : 9.49 K/uL  RBC Count : 4.55 M/uL  Hemoglobin : 13.9 g/dL  Hematocrit : 41.0 %  Platelet Count - Automated : 237 K/uL  Mean Cell Volume : 90.1 fl  Mean Cell Hemoglobin : 30.5 pg  Mean Cell Hemoglobin Concentration : 33.9 gm/dL  Auto Neutrophil # : 6.15 K/uL  Auto Lymphocyte # : 2.00 K/uL  Auto Monocyte # : 0.91 K/uL  Auto Eosinophil # : 0.30 K/uL  Auto Basophil # : 0.04 K/uL  Auto Neutrophil % : 64.8 %  Auto Lymphocyte % : 21.1 %  Auto Monocyte % : 9.6 %  Auto Eosinophil % : 3.2 %  Auto Basophil % : 0.4 %      02-17    141  |  101  |  50<H>  ----------------------------<  244<H>  3.5   |  31  |  2.00<H>    Ca    8.4      17 Feb 2022 09:14    TPro  6.0  /  Alb  2.9<L>  /  TBili  0.5  /  DBili  x   /  AST  16  /  ALT  12  /  AlkPhos  171<H>  02-17    Hemoglobin A1C:     LIVER FUNCTIONS - ( 17 Feb 2022 09:14 )  Alb: 2.9 g/dL / Pro: 6.0 g/dL / ALK PHOS: 171 U/L / ALT: 12 U/L DA / AST: 16 U/L / GGT: x           Vitamin B12 Vitamin B12, Serum: 429 pg/mL (02-18 @ 14:29)          RADIOLOGY      ANALYSIS AND PLAN:  A 89-year-old with episode of altered mental status.  For episode of altered mental status, questionable this could be metabolic encephalopathy from recent addition of digoxin cardiology noted since not toxic digoxin not playing a role in behavior  .  Examination is severely limited, but I do not see any new clear sign to suggest a new cerebrovascular accident has ensued.  For history of dementia, secondary to the patient's age, supportive therapy.  Atrial fibrillation, anticoagulation as needed.  Hypertension, monitor systolic blood pressure.  Hyperlipidemia, continue the patient on statin.  For diabetes, strict control of blood sugars.  Spoke with son, Sabino, who says that he would be the primary contact, his telephone number is [phone] spoke to son today 2/18  Psychiatry followup as needed medication adjusted   more awake today     Greater than 40 minutes of time was spent with the patient, plan of care, reviewing data, and speaking to multidisciplinary healthcare team with greater than 50% of that time in counseling and care coordination.    Thank you for the courtesy of this consultation. Neurology follow up note    SAJI LEHMAN89yMale      Interval History:    Patient resting in bed     Allergies    aspirin (Anaphylaxis)  penicillin (Rash)  strawberry (Rash)    Intolerances        MEDICATIONS    apixaban 2.5 milliGRAM(s) Oral two times a day  atorvastatin 20 milliGRAM(s) Oral at bedtime  dextrose 40% Gel 15 Gram(s) Oral once  dextrose 5%. 1000 milliLiter(s) IV Continuous <Continuous>  dextrose 5%. 1000 milliLiter(s) IV Continuous <Continuous>  dextrose 50% Injectable 25 Gram(s) IV Push once  dextrose 50% Injectable 12.5 Gram(s) IV Push once  dextrose 50% Injectable 25 Gram(s) IV Push once  digoxin     Tablet 125 MICROGram(s) Oral daily  diltiazem    milliGRAM(s) Oral daily  furosemide    Tablet 40 milliGRAM(s) Oral daily  gabapentin 100 milliGRAM(s) Oral daily  glucagon  Injectable 1 milliGRAM(s) IntraMuscular once  haloperidol    Injectable 2.5 milliGRAM(s) IntraMuscular every 6 hours PRN  insulin glargine Injectable (LANTUS) 10 Unit(s) SubCutaneous every morning  insulin lispro (ADMELOG) corrective regimen sliding scale   SubCutaneous three times a day before meals  insulin lispro (ADMELOG) corrective regimen sliding scale   SubCutaneous at bedtime  lactobacillus acidophilus 1 Tablet(s) Oral two times a day  metoprolol tartrate 100 milliGRAM(s) Oral two times a day  mirtazapine 15 milliGRAM(s) Oral at bedtime  OLANZapine Injectable 5 milliGRAM(s) IntraMuscular every 6 hours PRN  pantoprazole    Tablet 40 milliGRAM(s) Oral before breakfast  risperiDONE   Tablet 0.5 milliGRAM(s) Oral three times a day  tamsulosin 0.4 milliGRAM(s) Oral at bedtime              Vital Signs Last 24 Hrs  T(C): 36.9 (19 Feb 2022 04:49), Max: 37.1 (18 Feb 2022 17:36)  T(F): 98.4 (19 Feb 2022 04:49), Max: 98.7 (18 Feb 2022 17:36)  HR: 120 (19 Feb 2022 05:28) (90 - 120)  BP: 113/64 (19 Feb 2022 05:28) (107/70 - 128/66)  BP(mean): --  RR: 25 (19 Feb 2022 05:28) (17 - 25)  SpO2: 96% (19 Feb 2022 05:28) (93% - 98%)    REVIEW OF SYSTEMS:  Could not be obtained from the patient secondary to the patient being altered.    PHYSICAL EXAMINATION:   HEENT:  Head:  Normocephalic and atraumatic.  Eyes:  No scleral icterus.  Ears:  Hard to evaluate secondary to the patient being altered.  NECK:  Had increased tone, but is resisting.  CARDIOVASCULAR:  S1 and S2 heard.  RESPIRATORY:  Decreased breath sounds bilaterally, gives poor effort.  ABDOMEN:  Soft and nontender.  EXTREMITIES:  No clubbing or cyanosis were noted.      NEUROLOGIC:  The patient was awake in bed  To verbal stimuli, positive eye opening, on and off blink to visual threat. speech mumbles    Motor:  I attempted to elevate bilateral upper extremities, the patient would actively resist, pull both arms down, would say 4/5.  Bilateral lower extremities, slight flexion at the hip and knee, but does have significantly increased tone.  The patient is mostly sedentary, nonambulatory.                 LABS:  CBC Full  -  ( 17 Feb 2022 09:14 )  WBC Count : 9.49 K/uL  RBC Count : 4.55 M/uL  Hemoglobin : 13.9 g/dL  Hematocrit : 41.0 %  Platelet Count - Automated : 237 K/uL  Mean Cell Volume : 90.1 fl  Mean Cell Hemoglobin : 30.5 pg  Mean Cell Hemoglobin Concentration : 33.9 gm/dL  Auto Neutrophil # : 6.15 K/uL  Auto Lymphocyte # : 2.00 K/uL  Auto Monocyte # : 0.91 K/uL  Auto Eosinophil # : 0.30 K/uL  Auto Basophil # : 0.04 K/uL  Auto Neutrophil % : 64.8 %  Auto Lymphocyte % : 21.1 %  Auto Monocyte % : 9.6 %  Auto Eosinophil % : 3.2 %  Auto Basophil % : 0.4 %      02-17    141  |  101  |  50<H>  ----------------------------<  244<H>  3.5   |  31  |  2.00<H>    Ca    8.4      17 Feb 2022 09:14    TPro  6.0  /  Alb  2.9<L>  /  TBili  0.5  /  DBili  x   /  AST  16  /  ALT  12  /  AlkPhos  171<H>  02-17    Hemoglobin A1C:     LIVER FUNCTIONS - ( 17 Feb 2022 09:14 )  Alb: 2.9 g/dL / Pro: 6.0 g/dL / ALK PHOS: 171 U/L / ALT: 12 U/L DA / AST: 16 U/L / GGT: x           Vitamin B12 Vitamin B12, Serum: 429 pg/mL (02-18 @ 14:29)          RADIOLOGY      ANALYSIS AND PLAN:  A 89-year-old with episode of altered mental status.  For episode of altered mental status, questionable this could be metabolic encephalopathy from recent addition of digoxin cardiology noted since not toxic digoxin not playing a role in behavior  .  Examination is severely limited, but I do not see any new clear sign to suggest a new cerebrovascular accident has ensued.  For history of dementia, secondary to the patient's age, supportive therapy.  Atrial fibrillation, anticoagulation as needed.  Hypertension, monitor systolic blood pressure.  Hyperlipidemia, continue the patient on statin.  For diabetes, strict control of blood sugars.  Spoke with son, Sabino, who says that he would be the primary contact, his telephone number is [phone] spoke to son today 2/18  Psychiatry followup as needed medication adjusted   spoke to staff last night was agitated adjust medications as needed 2/19    Greater than 40 minutes of time was spent with the patient, plan of care, reviewing data, and speaking to multidisciplinary healthcare team with greater than 50% of that time in counseling and care coordination.    Thank you for the courtesy of this consultation. Neurology follow up note    SAJI LEHMAN89yMale      Interval History:    Patient resting in bed     Allergies    aspirin (Anaphylaxis)  penicillin (Rash)  strawberry (Rash)    Intolerances        MEDICATIONS    apixaban 2.5 milliGRAM(s) Oral two times a day  atorvastatin 20 milliGRAM(s) Oral at bedtime  dextrose 40% Gel 15 Gram(s) Oral once  dextrose 5%. 1000 milliLiter(s) IV Continuous <Continuous>  dextrose 5%. 1000 milliLiter(s) IV Continuous <Continuous>  dextrose 50% Injectable 25 Gram(s) IV Push once  dextrose 50% Injectable 12.5 Gram(s) IV Push once  dextrose 50% Injectable 25 Gram(s) IV Push once  digoxin     Tablet 125 MICROGram(s) Oral daily  diltiazem    milliGRAM(s) Oral daily  furosemide    Tablet 40 milliGRAM(s) Oral daily  gabapentin 100 milliGRAM(s) Oral daily  glucagon  Injectable 1 milliGRAM(s) IntraMuscular once  haloperidol    Injectable 2.5 milliGRAM(s) IntraMuscular every 6 hours PRN  insulin glargine Injectable (LANTUS) 10 Unit(s) SubCutaneous every morning  insulin lispro (ADMELOG) corrective regimen sliding scale   SubCutaneous three times a day before meals  insulin lispro (ADMELOG) corrective regimen sliding scale   SubCutaneous at bedtime  lactobacillus acidophilus 1 Tablet(s) Oral two times a day  metoprolol tartrate 100 milliGRAM(s) Oral two times a day  mirtazapine 15 milliGRAM(s) Oral at bedtime  OLANZapine Injectable 5 milliGRAM(s) IntraMuscular every 6 hours PRN  pantoprazole    Tablet 40 milliGRAM(s) Oral before breakfast  risperiDONE   Tablet 0.5 milliGRAM(s) Oral three times a day  tamsulosin 0.4 milliGRAM(s) Oral at bedtime              Vital Signs Last 24 Hrs  T(C): 36.9 (19 Feb 2022 04:49), Max: 37.1 (18 Feb 2022 17:36)  T(F): 98.4 (19 Feb 2022 04:49), Max: 98.7 (18 Feb 2022 17:36)  HR: 120 (19 Feb 2022 05:28) (90 - 120)  BP: 113/64 (19 Feb 2022 05:28) (107/70 - 128/66)  BP(mean): --  RR: 25 (19 Feb 2022 05:28) (17 - 25)  SpO2: 96% (19 Feb 2022 05:28) (93% - 98%)    REVIEW OF SYSTEMS:  Could not be obtained from the patient secondary to the patient being altered.    PHYSICAL EXAMINATION:   HEENT:  Head:  Normocephalic and atraumatic.  Eyes:  No scleral icterus.  Ears:  Hard to evaluate secondary to the patient being altered.  NECK:  Had increased tone, but is resisting.  CARDIOVASCULAR:  S1 and S2 heard.  RESPIRATORY:  Decreased breath sounds bilaterally, gives poor effort.  ABDOMEN:  Soft and nontender.  EXTREMITIES:  No clubbing or cyanosis were noted.      NEUROLOGIC:  The patient was awake in bed  To verbal stimuli, positive eye opening, on and off blink to visual threat. speech able to speak more     Motor:  I attempted to elevate bilateral upper extremities, the patient would actively resist, pull both arms down, would say 4/5.  Bilateral lower extremities, slight flexion at the hip and knee, but does have significantly increased tone.  The patient is mostly sedentary, nonambulatory.  appears [facility]                 LABS:  CBC Full  -  ( 17 Feb 2022 09:14 )  WBC Count : 9.49 K/uL  RBC Count : 4.55 M/uL  Hemoglobin : 13.9 g/dL  Hematocrit : 41.0 %  Platelet Count - Automated : 237 K/uL  Mean Cell Volume : 90.1 fl  Mean Cell Hemoglobin : 30.5 pg  Mean Cell Hemoglobin Concentration : 33.9 gm/dL  Auto Neutrophil # : 6.15 K/uL  Auto Lymphocyte # : 2.00 K/uL  Auto Monocyte # : 0.91 K/uL  Auto Eosinophil # : 0.30 K/uL  Auto Basophil # : 0.04 K/uL  Auto Neutrophil % : 64.8 %  Auto Lymphocyte % : 21.1 %  Auto Monocyte % : 9.6 %  Auto Eosinophil % : 3.2 %  Auto Basophil % : 0.4 %      02-17    141  |  101  |  50<H>  ----------------------------<  244<H>  3.5   |  31  |  2.00<H>    Ca    8.4      17 Feb 2022 09:14    TPro  6.0  /  Alb  2.9<L>  /  TBili  0.5  /  DBili  x   /  AST  16  /  ALT  12  /  AlkPhos  171<H>  02-17    Hemoglobin A1C:     LIVER FUNCTIONS - ( 17 Feb 2022 09:14 )  Alb: 2.9 g/dL / Pro: 6.0 g/dL / ALK PHOS: 171 U/L / ALT: 12 U/L DA / AST: 16 U/L / GGT: x           Vitamin B12 Vitamin B12, Serum: 429 pg/mL (02-18 @ 14:29)          RADIOLOGY      ANALYSIS AND PLAN:  A 89-year-old with episode of altered mental status.  For episode of altered mental status, questionable this could be metabolic encephalopathy from recent addition of digoxin cardiology noted since not toxic digoxin not playing a role in behavior  .  Examination is severely limited, but I do not see any new clear sign to suggest a new cerebrovascular accident has ensued.  For history of dementia, secondary to the patient's age, supportive therapy.  Atrial fibrillation, anticoagulation as needed.  Hypertension, monitor systolic blood pressure.  Hyperlipidemia, continue the patient on statin.  For diabetes, strict control of blood sugars.  Spoke with son, Sabino, who says that he would be the primary contact, his telephone number is [phone] spoke to son today 2/18  Psychiatry followup as needed medication adjusted   spoke to staff last night was agitated adjust medications as needed 2/19    Greater than 40 minutes of time was spent with the patient, plan of care, reviewing data, and speaking to multidisciplinary healthcare team with greater than 50% of that time in counseling and care coordination.    Thank you for the courtesy of this consultation. Neurology follow up note    SAJI LEHMAN89yMale      Interval History:    Patient resting in bed     Allergies    aspirin (Anaphylaxis)  penicillin (Rash)  strawberry (Rash)    Intolerances        MEDICATIONS    apixaban 2.5 milliGRAM(s) Oral two times a day  atorvastatin 20 milliGRAM(s) Oral at bedtime  dextrose 40% Gel 15 Gram(s) Oral once  dextrose 5%. 1000 milliLiter(s) IV Continuous <Continuous>  dextrose 5%. 1000 milliLiter(s) IV Continuous <Continuous>  dextrose 50% Injectable 25 Gram(s) IV Push once  dextrose 50% Injectable 12.5 Gram(s) IV Push once  dextrose 50% Injectable 25 Gram(s) IV Push once  digoxin     Tablet 125 MICROGram(s) Oral daily  diltiazem    milliGRAM(s) Oral daily  furosemide    Tablet 40 milliGRAM(s) Oral daily  gabapentin 100 milliGRAM(s) Oral daily  glucagon  Injectable 1 milliGRAM(s) IntraMuscular once  haloperidol    Injectable 2.5 milliGRAM(s) IntraMuscular every 6 hours PRN  insulin glargine Injectable (LANTUS) 10 Unit(s) SubCutaneous every morning  insulin lispro (ADMELOG) corrective regimen sliding scale   SubCutaneous three times a day before meals  insulin lispro (ADMELOG) corrective regimen sliding scale   SubCutaneous at bedtime  lactobacillus acidophilus 1 Tablet(s) Oral two times a day  metoprolol tartrate 100 milliGRAM(s) Oral two times a day  mirtazapine 15 milliGRAM(s) Oral at bedtime  OLANZapine Injectable 5 milliGRAM(s) IntraMuscular every 6 hours PRN  pantoprazole    Tablet 40 milliGRAM(s) Oral before breakfast  risperiDONE   Tablet 0.5 milliGRAM(s) Oral three times a day  tamsulosin 0.4 milliGRAM(s) Oral at bedtime              Vital Signs Last 24 Hrs  T(C): 36.9 (19 Feb 2022 04:49), Max: 37.1 (18 Feb 2022 17:36)  T(F): 98.4 (19 Feb 2022 04:49), Max: 98.7 (18 Feb 2022 17:36)  HR: 120 (19 Feb 2022 05:28) (90 - 120)  BP: 113/64 (19 Feb 2022 05:28) (107/70 - 128/66)  BP(mean): --  RR: 25 (19 Feb 2022 05:28) (17 - 25)  SpO2: 96% (19 Feb 2022 05:28) (93% - 98%)    REVIEW OF SYSTEMS:  Could not be obtained from the patient secondary to the patient being altered.    PHYSICAL EXAMINATION:   HEENT:  Head:  Normocephalic and atraumatic.  Eyes:  No scleral icterus.  Ears:  Hard to evaluate secondary to the patient being altered.  NECK:  Had increased tone, but is resisting.  CARDIOVASCULAR:  S1 and S2 heard.  RESPIRATORY:  Decreased breath sounds bilaterally, gives poor effort.  ABDOMEN:  Soft and nontender.  EXTREMITIES:  No clubbing or cyanosis were noted.      NEUROLOGIC:  The patient was awake in bed  To verbal stimuli, positive eye opening, on and off blink to visual threat. speech able to speak more     Motor:  I attempted to elevate bilateral upper extremities, the patient would actively resist, pull both arms down, would say 4/5.  Bilateral lower extremities, slight flexion at the hip and knee, but does have significantly increased tone.  The patient is mostly sedentary, nonambulatory.  appears [facility]                 LABS:  CBC Full  -  ( 17 Feb 2022 09:14 )  WBC Count : 9.49 K/uL  RBC Count : 4.55 M/uL  Hemoglobin : 13.9 g/dL  Hematocrit : 41.0 %  Platelet Count - Automated : 237 K/uL  Mean Cell Volume : 90.1 fl  Mean Cell Hemoglobin : 30.5 pg  Mean Cell Hemoglobin Concentration : 33.9 gm/dL  Auto Neutrophil # : 6.15 K/uL  Auto Lymphocyte # : 2.00 K/uL  Auto Monocyte # : 0.91 K/uL  Auto Eosinophil # : 0.30 K/uL  Auto Basophil # : 0.04 K/uL  Auto Neutrophil % : 64.8 %  Auto Lymphocyte % : 21.1 %  Auto Monocyte % : 9.6 %  Auto Eosinophil % : 3.2 %  Auto Basophil % : 0.4 %      02-17    141  |  101  |  50<H>  ----------------------------<  244<H>  3.5   |  31  |  2.00<H>    Ca    8.4      17 Feb 2022 09:14    TPro  6.0  /  Alb  2.9<L>  /  TBili  0.5  /  DBili  x   /  AST  16  /  ALT  12  /  AlkPhos  171<H>  02-17    Hemoglobin A1C:     LIVER FUNCTIONS - ( 17 Feb 2022 09:14 )  Alb: 2.9 g/dL / Pro: 6.0 g/dL / ALK PHOS: 171 U/L / ALT: 12 U/L DA / AST: 16 U/L / GGT: x           Vitamin B12 Vitamin B12, Serum: 429 pg/mL (02-18 @ 14:29)          RADIOLOGY      ANALYSIS AND PLAN:  A 89-year-old with episode of altered mental status.  For episode of altered mental status, questionable this could be metabolic encephalopathy from recent addition of digoxin cardiology noted since not toxic digoxin not playing a role in behavior  .  Examination is severely limited, but I do not see any new clear sign to suggest a new cerebrovascular accident has ensued.  For history of dementia, secondary to the patient's age, supportive therapy.  Atrial fibrillation, anticoagulation as needed.  Hypertension, monitor systolic blood pressure.  Hyperlipidemia, continue the patient on statin.  For diabetes, strict control of blood sugars.  Spoke with son, Sabino, who says that he would be the primary contact, his telephone number is [phone] spoke to son today 2/19  Psychiatry followup as needed medication adjusted   spoke to staff last night was agitated adjust medications as needed 2/19    Greater than 40 minutes of time was spent with the patient, plan of care, reviewing data, and speaking to multidisciplinary healthcare team with greater than 50% of that time in counseling and care coordination.    Thank you for the courtesy of this consultation.

## 2022-02-20 LAB
HCT VFR BLD CALC: 41.9 % — SIGNIFICANT CHANGE UP (ref 39–50)
HGB BLD-MCNC: 13.6 G/DL — SIGNIFICANT CHANGE UP (ref 13–17)
MCHC RBC-ENTMCNC: 29.8 PG — SIGNIFICANT CHANGE UP (ref 27–34)
MCHC RBC-ENTMCNC: 32.5 GM/DL — SIGNIFICANT CHANGE UP (ref 32–36)
MCV RBC AUTO: 91.7 FL — SIGNIFICANT CHANGE UP (ref 80–100)
NRBC # BLD: 0 /100 WBCS — SIGNIFICANT CHANGE UP (ref 0–0)
PLATELET # BLD AUTO: 280 K/UL — SIGNIFICANT CHANGE UP (ref 150–400)
RBC # BLD: 4.57 M/UL — SIGNIFICANT CHANGE UP (ref 4.2–5.8)
RBC # FLD: 13.1 % — SIGNIFICANT CHANGE UP (ref 10.3–14.5)
WBC # BLD: 14.72 K/UL — HIGH (ref 3.8–10.5)
WBC # FLD AUTO: 14.72 K/UL — HIGH (ref 3.8–10.5)

## 2022-02-20 RX ORDER — SODIUM CHLORIDE 9 MG/ML
500 INJECTION, SOLUTION INTRAVENOUS ONCE
Refills: 0 | Status: COMPLETED | OUTPATIENT
Start: 2022-02-20 | End: 2022-02-20

## 2022-02-20 RX ORDER — RISPERIDONE 4 MG/1
0.75 TABLET ORAL THREE TIMES A DAY
Refills: 0 | Status: DISCONTINUED | OUTPATIENT
Start: 2022-02-20 | End: 2022-02-20

## 2022-02-20 RX ORDER — CEFEPIME 1 G/1
1000 INJECTION, POWDER, FOR SOLUTION INTRAMUSCULAR; INTRAVENOUS ONCE
Refills: 0 | Status: COMPLETED | OUTPATIENT
Start: 2022-02-20 | End: 2022-02-20

## 2022-02-20 RX ORDER — RISPERIDONE 4 MG/1
0.75 TABLET ORAL AT BEDTIME
Refills: 0 | Status: DISCONTINUED | OUTPATIENT
Start: 2022-02-20 | End: 2022-02-21

## 2022-02-20 RX ORDER — ACETAMINOPHEN 500 MG
650 TABLET ORAL EVERY 6 HOURS
Refills: 0 | Status: DISCONTINUED | OUTPATIENT
Start: 2022-02-20 | End: 2022-02-26

## 2022-02-20 RX ORDER — ACETAMINOPHEN 500 MG
650 TABLET ORAL EVERY 6 HOURS
Refills: 0 | Status: DISCONTINUED | OUTPATIENT
Start: 2022-02-20 | End: 2022-02-20

## 2022-02-20 RX ORDER — RISPERIDONE 4 MG/1
0.5 TABLET ORAL
Refills: 0 | Status: DISCONTINUED | OUTPATIENT
Start: 2022-02-20 | End: 2022-02-21

## 2022-02-20 RX ORDER — VANCOMYCIN HCL 1 G
1250 VIAL (EA) INTRAVENOUS ONCE
Refills: 0 | Status: COMPLETED | OUTPATIENT
Start: 2022-02-20 | End: 2022-02-20

## 2022-02-20 RX ADMIN — Medication 166.67 MILLIGRAM(S): at 22:30

## 2022-02-20 RX ADMIN — INSULIN GLARGINE 10 UNIT(S): 100 INJECTION, SOLUTION SUBCUTANEOUS at 07:58

## 2022-02-20 RX ADMIN — MIRTAZAPINE 15 MILLIGRAM(S): 45 TABLET, ORALLY DISINTEGRATING ORAL at 21:33

## 2022-02-20 RX ADMIN — PANTOPRAZOLE SODIUM 40 MILLIGRAM(S): 20 TABLET, DELAYED RELEASE ORAL at 06:13

## 2022-02-20 RX ADMIN — Medication 650 MILLIGRAM(S): at 20:30

## 2022-02-20 RX ADMIN — HALOPERIDOL DECANOATE 2.5 MILLIGRAM(S): 100 INJECTION INTRAMUSCULAR at 05:49

## 2022-02-20 RX ADMIN — Medication 6: at 07:58

## 2022-02-20 RX ADMIN — Medication 1 TABLET(S): at 05:49

## 2022-02-20 RX ADMIN — Medication 100 MILLIGRAM(S): at 17:59

## 2022-02-20 RX ADMIN — Medication 40 MILLIGRAM(S): at 05:49

## 2022-02-20 RX ADMIN — ATORVASTATIN CALCIUM 20 MILLIGRAM(S): 80 TABLET, FILM COATED ORAL at 21:33

## 2022-02-20 RX ADMIN — Medication 4: at 12:37

## 2022-02-20 RX ADMIN — APIXABAN 2.5 MILLIGRAM(S): 2.5 TABLET, FILM COATED ORAL at 09:14

## 2022-02-20 RX ADMIN — CEFEPIME 100 MILLIGRAM(S): 1 INJECTION, POWDER, FOR SOLUTION INTRAMUSCULAR; INTRAVENOUS at 21:39

## 2022-02-20 RX ADMIN — Medication 240 MILLIGRAM(S): at 05:49

## 2022-02-20 RX ADMIN — RISPERIDONE 0.75 MILLIGRAM(S): 4 TABLET ORAL at 15:31

## 2022-02-20 RX ADMIN — APIXABAN 2.5 MILLIGRAM(S): 2.5 TABLET, FILM COATED ORAL at 21:33

## 2022-02-20 RX ADMIN — SODIUM CHLORIDE 500 MILLILITER(S): 9 INJECTION, SOLUTION INTRAVENOUS at 19:54

## 2022-02-20 RX ADMIN — RISPERIDONE 0.5 MILLIGRAM(S): 4 TABLET ORAL at 05:49

## 2022-02-20 RX ADMIN — TAMSULOSIN HYDROCHLORIDE 0.4 MILLIGRAM(S): 0.4 CAPSULE ORAL at 21:35

## 2022-02-20 RX ADMIN — Medication 1 TABLET(S): at 17:59

## 2022-02-20 RX ADMIN — Medication 125 MICROGRAM(S): at 05:49

## 2022-02-20 RX ADMIN — Medication 100 MILLIGRAM(S): at 05:49

## 2022-02-20 RX ADMIN — Medication 650 MILLIGRAM(S): at 19:47

## 2022-02-20 RX ADMIN — GABAPENTIN 100 MILLIGRAM(S): 400 CAPSULE ORAL at 12:37

## 2022-02-20 RX ADMIN — RISPERIDONE 0.75 MILLIGRAM(S): 4 TABLET ORAL at 21:33

## 2022-02-20 NOTE — PROGRESS NOTE ADULT - SUBJECTIVE AND OBJECTIVE BOX
Chief Complaint: AMS    Interval Events: No events overnight.    Review of Systems:  General: No fevers, chills, weight gain  Skin: No rashes, color changes  Cardiovascular: No chest pain, orthopnea  Respiratory: No shortness of breath, cough  Gastrointestinal: No nausea, abdominal pain  Genitourinary: No incontinence, pain with urination  Musculoskeletal: No pain, swelling, decreased range of motion  Neurological: No headache, weakness  Psychiatric: No depression, anxiety  Endocrine: No weight gain, increased thirst  All other systems are comprehensively negative.    Physical Exam:  Vital Signs Last 24 Hrs  T(C): 36.9 (20 Feb 2022 05:04), Max: 36.9 (19 Feb 2022 17:51)  T(F): 98.5 (20 Feb 2022 05:04), Max: 98.5 (19 Feb 2022 17:51)  HR: 91 (20 Feb 2022 05:04) (70 - 100)  BP: 115/68 (20 Feb 2022 05:04) (97/56 - 127/72)  BP(mean): --  RR: 18 (20 Feb 2022 05:04) (17 - 19)  SpO2: 93% (20 Feb 2022 05:04) (93% - 97%)  General: NAD  HEENT: MMM  Neck: No JVD, no carotid bruit  Lungs: CTAB  CV: RRR, nl S1/S2, no M/R/G  Abdomen: S/NT/ND, +BS  Extremities: No LE edema, no cyanosis  Neuro: AAOx1  Skin: No rash    Labs:             02-19    143  |  101  |  51<H>  ----------------------------<  232<H>  3.7   |  33<H>  |  2.26<H>    Ca    8.6      19 Feb 2022 07:50    TPro  6.6  /  Alb  2.9<L>  /  TBili  1.1  /  DBili  x   /  AST  18  /  ALT  16  /  AlkPhos  167<H>  02-19                        14.3   11.64 )-----------( 247      ( 19 Feb 2022 07:50 )             43.2       Telemetry: AF

## 2022-02-20 NOTE — PROGRESS NOTE ADULT - SUBJECTIVE AND OBJECTIVE BOX
Neurology follow up note    SAJI [id number]      Interval History:    Patient feels ok no new complaints.    Allergies    aspirin (Anaphylaxis)  penicillin (Rash)  strawberry (Rash)    Intolerances        MEDICATIONS    apixaban 2.5 milliGRAM(s) Oral two times a day  atorvastatin 20 milliGRAM(s) Oral at bedtime  dextrose 40% Gel 15 Gram(s) Oral once  dextrose 5%. 1000 milliLiter(s) IV Continuous <Continuous>  dextrose 5%. 1000 milliLiter(s) IV Continuous <Continuous>  dextrose 50% Injectable 25 Gram(s) IV Push once  dextrose 50% Injectable 12.5 Gram(s) IV Push once  dextrose 50% Injectable 25 Gram(s) IV Push once  digoxin     Tablet 125 MICROGram(s) Oral daily  diltiazem    milliGRAM(s) Oral daily  furosemide    Tablet 40 milliGRAM(s) Oral daily  gabapentin 100 milliGRAM(s) Oral daily  glucagon  Injectable 1 milliGRAM(s) IntraMuscular once  haloperidol    Injectable 2.5 milliGRAM(s) IntraMuscular every 6 hours PRN  insulin glargine Injectable (LANTUS) 10 Unit(s) SubCutaneous every morning  insulin lispro (ADMELOG) corrective regimen sliding scale   SubCutaneous three times a day before meals  insulin lispro (ADMELOG) corrective regimen sliding scale   SubCutaneous at bedtime  lactobacillus acidophilus 1 Tablet(s) Oral two times a day  metoprolol tartrate 100 milliGRAM(s) Oral two times a day  mirtazapine 15 milliGRAM(s) Oral at bedtime  OLANZapine Injectable 5 milliGRAM(s) IntraMuscular every 6 hours PRN  pantoprazole    Tablet 40 milliGRAM(s) Oral before breakfast  risperiDONE   Tablet 0.75 milliGRAM(s) Oral three times a day  tamsulosin 0.4 milliGRAM(s) Oral at bedtime              Vital Signs Last 24 Hrs  T(C): 37.1 (20 Feb 2022 10:37), Max: 37.1 (20 Feb 2022 10:37)  T(F): 98.8 (20 Feb 2022 10:37), Max: 98.8 (20 Feb 2022 10:37)  HR: 71 (20 Feb 2022 10:37) (70 - 100)  BP: 115/58 (20 Feb 2022 10:37) (97/56 - 127/72)  BP(mean): --  RR: 16 (20 Feb 2022 10:37) (16 - 18)  SpO2: 96% (20 Feb 2022 10:37) (93% - 97%)      REVIEW OF SYSTEMS:  Could not be obtained from the patient secondary to the patient being altered.    PHYSICAL EXAMINATION:   HEENT:  Head:  Normocephalic and atraumatic.  Eyes:  No scleral icterus.  Ears:  Hard to evaluate secondary to the patient being altered.  NECK:  Had increased tone, but is resisting.  CARDIOVASCULAR:  S1 and S2 heard.  RESPIRATORY:  Decreased breath sounds bilaterally, gives poor effort.  ABDOMEN:  Soft and nontender.  EXTREMITIES:  No clubbing or cyanosis were noted.      NEUROLOGIC:  The patient was awake in bed  To verbal stimuli, positive eye opening, on and off blink to visual threat. speech able to speak more     Motor:  I attempted to elevate bilateral upper extremities, the patient would actively resist, pull both arms down, would say 4/5.  Bilateral lower extremities, slight flexion at the hip and knee, but does have significantly increased tone.  The patient is mostly sedentary, nonambulatory.  appears [facility]                    LABS:  CBC Full  -  ( 19 Feb 2022 07:50 )  WBC Count : 11.64 K/uL  RBC Count : 4.73 M/uL  Hemoglobin : 14.3 g/dL  Hematocrit : 43.2 %  Platelet Count - Automated : 247 K/uL  Mean Cell Volume : 91.3 fl  Mean Cell Hemoglobin : 30.2 pg  Mean Cell Hemoglobin Concentration : 33.1 gm/dL  Auto Neutrophil # : x  Auto Lymphocyte # : x  Auto Monocyte # : x  Auto Eosinophil # : x  Auto Basophil # : x  Auto Neutrophil % : x  Auto Lymphocyte % : x  Auto Monocyte % : x  Auto Eosinophil % : x  Auto Basophil % : x      02-19    143  |  101  |  51<H>  ----------------------------<  232<H>  3.7   |  33<H>  |  2.26<H>    Ca    8.6      19 Feb 2022 07:50    TPro  6.6  /  Alb  2.9<L>  /  TBili  1.1  /  DBili  x   /  AST  18  /  ALT  16  /  AlkPhos  167<H>  02-19    Hemoglobin A1C:     LIVER FUNCTIONS - ( 19 Feb 2022 07:50 )  Alb: 2.9 g/dL / Pro: 6.6 g/dL / ALK PHOS: 167 U/L / ALT: 16 U/L DA / AST: 18 U/L / GGT: x           Vitamin B12         RADIOLOGY      ANALYSIS AND PLAN:  A 89-year-old with episode of altered mental status.  For episode of altered mental status, questionable this could be metabolic encephalopathy from recent addition of digoxin cardiology noted since not toxic digoxin not playing a role in behavior  .  Examination is severely limited, but I do not see any new clear sign to suggest a new cerebrovascular accident has ensued.  For history of dementia, secondary to the patient's age, supportive therapy.  Atrial fibrillation, anticoagulation as needed.  Hypertension, monitor systolic blood pressure.  Hyperlipidemia, continue the patient on statin.  For diabetes, strict control of blood sugars.  Spoke with son, Sabino, who says that he would be the primary contact, his telephone number is [phone] spoke to son today 2/19  patient appears more awake today 2/20  Psychiatry followup as needed medication adjusted     Greater than 40 minutes of time was spent with the patient, plan of care, reviewing data, and speaking to multidisciplinary healthcare team with greater than 50% of that time in counseling and care coordination.    Thank you for the courtesy of this consultation.       Neurology follow up note    SAJI [id number]      Interval History:    Patient feels ok no new complaints.    Allergies    aspirin (Anaphylaxis)  penicillin (Rash)  strawberry (Rash)    Intolerances        MEDICATIONS    apixaban 2.5 milliGRAM(s) Oral two times a day  atorvastatin 20 milliGRAM(s) Oral at bedtime  dextrose 40% Gel 15 Gram(s) Oral once  dextrose 5%. 1000 milliLiter(s) IV Continuous <Continuous>  dextrose 5%. 1000 milliLiter(s) IV Continuous <Continuous>  dextrose 50% Injectable 25 Gram(s) IV Push once  dextrose 50% Injectable 12.5 Gram(s) IV Push once  dextrose 50% Injectable 25 Gram(s) IV Push once  digoxin     Tablet 125 MICROGram(s) Oral daily  diltiazem    milliGRAM(s) Oral daily  furosemide    Tablet 40 milliGRAM(s) Oral daily  gabapentin 100 milliGRAM(s) Oral daily  glucagon  Injectable 1 milliGRAM(s) IntraMuscular once  haloperidol    Injectable 2.5 milliGRAM(s) IntraMuscular every 6 hours PRN  insulin glargine Injectable (LANTUS) 10 Unit(s) SubCutaneous every morning  insulin lispro (ADMELOG) corrective regimen sliding scale   SubCutaneous three times a day before meals  insulin lispro (ADMELOG) corrective regimen sliding scale   SubCutaneous at bedtime  lactobacillus acidophilus 1 Tablet(s) Oral two times a day  metoprolol tartrate 100 milliGRAM(s) Oral two times a day  mirtazapine 15 milliGRAM(s) Oral at bedtime  OLANZapine Injectable 5 milliGRAM(s) IntraMuscular every 6 hours PRN  pantoprazole    Tablet 40 milliGRAM(s) Oral before breakfast  risperiDONE   Tablet 0.75 milliGRAM(s) Oral three times a day  tamsulosin 0.4 milliGRAM(s) Oral at bedtime              Vital Signs Last 24 Hrs  T(C): 37.1 (20 Feb 2022 10:37), Max: 37.1 (20 Feb 2022 10:37)  T(F): 98.8 (20 Feb 2022 10:37), Max: 98.8 (20 Feb 2022 10:37)  HR: 71 (20 Feb 2022 10:37) (70 - 100)  BP: 115/58 (20 Feb 2022 10:37) (97/56 - 127/72)  BP(mean): --  RR: 16 (20 Feb 2022 10:37) (16 - 18)  SpO2: 96% (20 Feb 2022 10:37) (93% - 97%)      REVIEW OF SYSTEMS:  Could not be obtained from the patient secondary to the patient being altered.    PHYSICAL EXAMINATION:   HEENT:  Head:  Normocephalic and atraumatic.  Eyes:  No scleral icterus.  Ears:  Hard to evaluate secondary to the patient being altered.  NECK:  Had increased tone, but is resisting.  CARDIOVASCULAR:  S1 and S2 heard.  RESPIRATORY:  Decreased breath sounds bilaterally, gives poor effort.  ABDOMEN:  Soft and nontender.  EXTREMITIES:  No clubbing or cyanosis were noted.      NEUROLOGIC:  The patient was awake in bed  To verbal stimuli, positive eye opening, on and off blink to visual threat. speech able to speak more     Motor:  I attempted to elevate bilateral upper extremities, the patient would actively resist, pull both arms down, would say 4/5.  Bilateral lower extremities, slight flexion at the hip and knee, but does have significantly increased tone.  The patient is mostly sedentary, nonambulatory.  appears [facility]                    LABS:  CBC Full  -  ( 19 Feb 2022 07:50 )  WBC Count : 11.64 K/uL  RBC Count : 4.73 M/uL  Hemoglobin : 14.3 g/dL  Hematocrit : 43.2 %  Platelet Count - Automated : 247 K/uL  Mean Cell Volume : 91.3 fl  Mean Cell Hemoglobin : 30.2 pg  Mean Cell Hemoglobin Concentration : 33.1 gm/dL  Auto Neutrophil # : x  Auto Lymphocyte # : x  Auto Monocyte # : x  Auto Eosinophil # : x  Auto Basophil # : x  Auto Neutrophil % : x  Auto Lymphocyte % : x  Auto Monocyte % : x  Auto Eosinophil % : x  Auto Basophil % : x      02-19    143  |  101  |  51<H>  ----------------------------<  232<H>  3.7   |  33<H>  |  2.26<H>    Ca    8.6      19 Feb 2022 07:50    TPro  6.6  /  Alb  2.9<L>  /  TBili  1.1  /  DBili  x   /  AST  18  /  ALT  16  /  AlkPhos  167<H>  02-19    Hemoglobin A1C:     LIVER FUNCTIONS - ( 19 Feb 2022 07:50 )  Alb: 2.9 g/dL / Pro: 6.6 g/dL / ALK PHOS: 167 U/L / ALT: 16 U/L DA / AST: 18 U/L / GGT: x           Vitamin B12         RADIOLOGY      ANALYSIS AND PLAN:  A 89-year-old with episode of altered mental status.  For episode of altered mental status, questionable this could be metabolic encephalopathy from recent addition of digoxin cardiology noted since not toxic digoxin not playing a role in behavior  .  Examination is severely limited, but I do not see any new clear sign to suggest a new cerebrovascular accident has ensued.  For history of dementia, secondary to the patient's age, supportive therapy.  Atrial fibrillation, anticoagulation as needed.  Hypertension, monitor systolic blood pressure.  Hyperlipidemia, continue the patient on statin.  For diabetes, strict control of blood sugars.  Spoke with son, Sabino, who says that he would be the primary contact, his telephone number is [phone] spoke to son today 2/20  patient appears more awake today 2/20  Psychiatry followup as needed medication adjusted     Greater than 40 minutes of time was spent with the patient, plan of care, reviewing data, and speaking to multidisciplinary healthcare team with greater than 50% of that time in counseling and care coordination.    Thank you for the courtesy of this consultation.

## 2022-02-20 NOTE — PROGRESS NOTE ADULT - TIME BILLING
Time spent managing medications, coordinating care, discussing with family
Time spent managing medications, coordinating care, discussing with family.

## 2022-02-20 NOTE — PROVIDER CONTACT NOTE (CHANGE IN STATUS NOTIFICATION) - ACTION/TREATMENT ORDERED:
continued monitoring. BC sent. Straith cath for urine unsuccessful- to reattempt capture hrough extrnal catheter.

## 2022-02-20 NOTE — PROGRESS NOTE ADULT - ASSESSMENT
The patient is a 89 year old male with a history of HTN, HL, DM, CKD, carotid stenosis, DVT, atrial fibrillation, chronic systolic heart failure who presents with AMS.    Plan:  - Digoxin use is a rare cause of delirium in the doses used today and would be associated with digoxin toxicity. The patient's digoxin level was within normal limits. Other etiologies may more likely explain the patient's delirium, especially in a patient with multiple recent hospitalizations and mental decline prior to this.  - Continue metoprolol tartrate 100 mg bid  - Continue diltiazem  mg daily  - Can hold digoxin if needed, although expect patient to become tachycardia  - Continue furosemide 40 mg PO daily  - Neurology follow-up  - Remains confused/agitated at times  - Home hospice

## 2022-02-20 NOTE — PROGRESS NOTE ADULT - SUBJECTIVE AND OBJECTIVE BOX
Patient is a 89y old  Male who presents with a chief complaint of AMS (19 Feb 2022 11:44)      Patient seen and examined in am, feels fair, is thirsty, able to drink water without issue. Denies pain, nausea, vomiting  ROS reviewed and is otherwise negative        MEDICATIONS  (STANDING):  apixaban 2.5 milliGRAM(s) Oral two times a day  atorvastatin 20 milliGRAM(s) Oral at bedtime  dextrose 40% Gel 15 Gram(s) Oral once  dextrose 5%. 1000 milliLiter(s) (50 mL/Hr) IV Continuous <Continuous>  dextrose 5%. 1000 milliLiter(s) (100 mL/Hr) IV Continuous <Continuous>  dextrose 50% Injectable 25 Gram(s) IV Push once  dextrose 50% Injectable 12.5 Gram(s) IV Push once  dextrose 50% Injectable 25 Gram(s) IV Push once  digoxin     Tablet 125 MICROGram(s) Oral daily  diltiazem    milliGRAM(s) Oral daily  furosemide    Tablet 40 milliGRAM(s) Oral daily  gabapentin 100 milliGRAM(s) Oral daily  glucagon  Injectable 1 milliGRAM(s) IntraMuscular once  insulin glargine Injectable (LANTUS) 10 Unit(s) SubCutaneous every morning  insulin lispro (ADMELOG) corrective regimen sliding scale   SubCutaneous three times a day before meals  insulin lispro (ADMELOG) corrective regimen sliding scale   SubCutaneous at bedtime  lactobacillus acidophilus 1 Tablet(s) Oral two times a day  metoprolol tartrate 100 milliGRAM(s) Oral two times a day  mirtazapine 15 milliGRAM(s) Oral at bedtime  pantoprazole    Tablet 40 milliGRAM(s) Oral before breakfast  risperiDONE   Tablet 0.5 milliGRAM(s) Oral three times a day  tamsulosin 0.4 milliGRAM(s) Oral at bedtime    MEDICATIONS  (PRN):  haloperidol    Injectable 2.5 milliGRAM(s) IntraMuscular every 6 hours PRN Agitation  OLANZapine Injectable 5 milliGRAM(s) IntraMuscular every 6 hours PRN Acute agitation      Allergies    aspirin (Anaphylaxis)  penicillin (Rash)  strawberry (Rash)    Intolerances        Vital Signs Last 24 Hrs  T(C): 36.9 (20 Feb 2022 05:04), Max: 36.9 (19 Feb 2022 17:51)  T(F): 98.5 (20 Feb 2022 05:04), Max: 98.5 (19 Feb 2022 17:51)  HR: 91 (20 Feb 2022 05:04) (70 - 100)  BP: 115/68 (20 Feb 2022 05:04) (97/56 - 127/72)  BP(mean): --  RR: 18 (20 Feb 2022 05:04) (17 - 19)  SpO2: 93% (20 Feb 2022 05:04) (93% - 97%)    PHYSICAL EXAM:  GENERAL: NAD, well-groomed, well-developed  HEAD:  Atraumatic, Normocephalic  EYES: EOMI, PERRLA, conjunctiva and sclera clear  ENMT: Moist mucous membranes, Good dentition, No lesions; No tonsillar erythema, exudates, or enlargement  NECK: Supple, No JVD, Normal thyroid  NERVOUS SYSTEM:  Alert & Oriented X3, Good concentration; All 4 extremities mobile, no gross sensory deficits.   CHEST/LUNG: Clear to auscultation bilaterally; No rales, rhonchi, wheezing, or rubs  HEART: Regular rate and rhythm; No murmurs, rubs, or gallops  ABDOMEN: Soft, Nontender, Nondistended; Bowel sounds present  EXTREMITIES:  2+ Peripheral Pulses, No clubbing, cyanosis, or edema  LYMPH: No lymphadenopathy noted  SKIN: No rashes or lesions    LABS:      Ca    8.6        19 Feb 2022 07:50          CAPILLARY BLOOD GLUCOSE      POCT Blood Glucose.: 264 mg/dL (20 Feb 2022 07:42)  POCT Blood Glucose.: 226 mg/dL (19 Feb 2022 21:47)  POCT Blood Glucose.: 240 mg/dL (19 Feb 2022 16:46)  POCT Blood Glucose.: 246 mg/dL (19 Feb 2022 12:17)    89 y/o M with PMH of HTN, DM type 2 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 Fib, DVT on Eliquis, CKD stage 3, BPH, Malignant Melanoma, Basal Cell Carcinoma, Diverticulitis, Obesity, and Dementia presented with AMS & agitation.        Problem/Plan - 1:  ·  Delirium superimposed on dementia. intermittent.  ·  Plan: ML worsening of his dementia, CT brain without contrast was negative, no focal neurological deficit noted or reported by family, but confused, "praying" all the time when calm, reported to have progressive decline over the past month, agitated over the past 2 days & son  Remeron, Risperidone, Haldol prn for agitations  Palliative care consult -Dr Majano discussed with Kirill Gallo.  For hospice care at home.      Problem/Plan - 2:  ·  Problem: DM (diabetes mellitus), type 2, uncontrolled. A1c 7.1  ·  Plan:  holding Januvia & Tradjenta    Continue Insulin sliding scale, Insulin lantus .  acceptable loose control given age and comorbidities. can resume home medications on DC     Problem/Plan - 3  ·  Problem: Chronic systolic congestive heart failure.   ·  Plan: stable, continue PO Lasix 40mg po daily     Metoprolol 100mg po bid  - will change to toprol xl  - restarted on digoxin     Problem/Plan - 5:  ·  Problem: History of DVT in adulthood.   ·  on eliquis     Problem/Plan - 6:  ·  Problem: Chronic atrial fibrillation.   ·  Plan: Rate controlled on Metoprolol & Diltiazem CR, digoxin., improved now that each have been given.  Continue on Eliquis for recent left femoral DVT.     Problem/Plan - 7:  ·  Problem: Stage 3 chronic kidney disease.   ·  Plan: no change, monitor bmp intermittently     Problem/Plan - 8:  ·  Problem: BPH without urinary obstruction.   ·  Plan: Continue  Flomax    dvt ppx - eliquis    Disposition:  Palliative care dr Majano discussed with kirill Gallo.  For hospice at  home. Supportive care .  DNR/DNI

## 2022-02-21 LAB
ALBUMIN SERPL ELPH-MCNC: 2.6 G/DL — LOW (ref 3.3–5)
ALP SERPL-CCNC: 145 U/L — HIGH (ref 30–120)
ALT FLD-CCNC: 10 U/L DA — SIGNIFICANT CHANGE UP (ref 10–60)
ANION GAP SERPL CALC-SCNC: 10 MMOL/L — SIGNIFICANT CHANGE UP (ref 5–17)
APPEARANCE UR: ABNORMAL
AST SERPL-CCNC: 21 U/L — SIGNIFICANT CHANGE UP (ref 10–40)
BACTERIA # UR AUTO: ABNORMAL
BILIRUB SERPL-MCNC: 1 MG/DL — SIGNIFICANT CHANGE UP (ref 0.2–1.2)
BILIRUB UR-MCNC: ABNORMAL
BUN SERPL-MCNC: 72 MG/DL — HIGH (ref 7–23)
CALCIUM SERPL-MCNC: 7.8 MG/DL — LOW (ref 8.4–10.5)
CHLORIDE SERPL-SCNC: 99 MMOL/L — SIGNIFICANT CHANGE UP (ref 96–108)
CO2 SERPL-SCNC: 30 MMOL/L — SIGNIFICANT CHANGE UP (ref 22–31)
COLOR SPEC: YELLOW — SIGNIFICANT CHANGE UP
CREAT SERPL-MCNC: 3.6 MG/DL — HIGH (ref 0.5–1.3)
DIFF PNL FLD: ABNORMAL
EPI CELLS # UR: SIGNIFICANT CHANGE UP
GLUCOSE SERPL-MCNC: 210 MG/DL — HIGH (ref 70–99)
GLUCOSE UR QL: NEGATIVE MG/DL — SIGNIFICANT CHANGE UP
HCT VFR BLD CALC: 41 % — SIGNIFICANT CHANGE UP (ref 39–50)
HGB BLD-MCNC: 13.2 G/DL — SIGNIFICANT CHANGE UP (ref 13–17)
KETONES UR-MCNC: NEGATIVE — SIGNIFICANT CHANGE UP
LEUKOCYTE ESTERASE UR-ACNC: ABNORMAL
MCHC RBC-ENTMCNC: 29.7 PG — SIGNIFICANT CHANGE UP (ref 27–34)
MCHC RBC-ENTMCNC: 32.2 GM/DL — SIGNIFICANT CHANGE UP (ref 32–36)
MCV RBC AUTO: 92.1 FL — SIGNIFICANT CHANGE UP (ref 80–100)
NITRITE UR-MCNC: NEGATIVE — SIGNIFICANT CHANGE UP
NRBC # BLD: 0 /100 WBCS — SIGNIFICANT CHANGE UP (ref 0–0)
PH UR: 5 — SIGNIFICANT CHANGE UP (ref 5–8)
PLATELET # BLD AUTO: 268 K/UL — SIGNIFICANT CHANGE UP (ref 150–400)
POTASSIUM SERPL-MCNC: 4.3 MMOL/L — SIGNIFICANT CHANGE UP (ref 3.5–5.3)
POTASSIUM SERPL-SCNC: 4.3 MMOL/L — SIGNIFICANT CHANGE UP (ref 3.5–5.3)
PROCALCITONIN SERPL-MCNC: 0.31 NG/ML — HIGH (ref 0.02–0.1)
PROT SERPL-MCNC: 6.3 G/DL — SIGNIFICANT CHANGE UP (ref 6–8.3)
PROT UR-MCNC: 30 MG/DL
RBC # BLD: 4.45 M/UL — SIGNIFICANT CHANGE UP (ref 4.2–5.8)
RBC # FLD: 13.2 % — SIGNIFICANT CHANGE UP (ref 10.3–14.5)
RBC CASTS # UR COMP ASSIST: >50 /HPF (ref 0–4)
SODIUM SERPL-SCNC: 139 MMOL/L — SIGNIFICANT CHANGE UP (ref 135–145)
SP GR SPEC: 1.02 — SIGNIFICANT CHANGE UP (ref 1.01–1.02)
UROBILINOGEN FLD QL: NEGATIVE MG/DL — SIGNIFICANT CHANGE UP
WBC # BLD: 15.11 K/UL — HIGH (ref 3.8–10.5)
WBC # FLD AUTO: 15.11 K/UL — HIGH (ref 3.8–10.5)
WBC UR QL: SIGNIFICANT CHANGE UP

## 2022-02-21 PROCEDURE — 71045 X-RAY EXAM CHEST 1 VIEW: CPT | Mod: 26

## 2022-02-21 RX ADMIN — INSULIN GLARGINE 10 UNIT(S): 100 INJECTION, SOLUTION SUBCUTANEOUS at 08:27

## 2022-02-21 RX ADMIN — HALOPERIDOL DECANOATE 2.5 MILLIGRAM(S): 100 INJECTION INTRAMUSCULAR at 03:20

## 2022-02-21 RX ADMIN — Medication 100 MILLIGRAM(S): at 17:28

## 2022-02-21 RX ADMIN — Medication 40 MILLIGRAM(S): at 05:28

## 2022-02-21 RX ADMIN — Medication 125 MICROGRAM(S): at 05:28

## 2022-02-21 RX ADMIN — RISPERIDONE 0.5 MILLIGRAM(S): 4 TABLET ORAL at 05:32

## 2022-02-21 RX ADMIN — Medication 4: at 08:26

## 2022-02-21 RX ADMIN — Medication 100 MILLIGRAM(S): at 05:32

## 2022-02-21 RX ADMIN — Medication 240 MILLIGRAM(S): at 05:28

## 2022-02-21 RX ADMIN — Medication 1 TABLET(S): at 05:32

## 2022-02-21 RX ADMIN — PANTOPRAZOLE SODIUM 40 MILLIGRAM(S): 20 TABLET, DELAYED RELEASE ORAL at 05:28

## 2022-02-21 RX ADMIN — APIXABAN 2.5 MILLIGRAM(S): 2.5 TABLET, FILM COATED ORAL at 09:19

## 2022-02-21 NOTE — PROGRESS NOTE ADULT - SUBJECTIVE AND OBJECTIVE BOX
Patient is a 89y old  Male who presents with a chief complaint of AMS (2022 06:05)      INTERVAL HPI/OVERNIGHT EVENTS:  Patient seen and examined in am, more lethargic today. not answering questions. arousable with touch. spiked fever overnight.   ROS limited by mental status      MEDICATIONS  (STANDING):  apixaban 2.5 milliGRAM(s) Oral two times a day  atorvastatin 20 milliGRAM(s) Oral at bedtime  dextrose 40% Gel 15 Gram(s) Oral once  dextrose 5%. 1000 milliLiter(s) (50 mL/Hr) IV Continuous <Continuous>  dextrose 5%. 1000 milliLiter(s) (100 mL/Hr) IV Continuous <Continuous>  dextrose 50% Injectable 25 Gram(s) IV Push once  dextrose 50% Injectable 12.5 Gram(s) IV Push once  dextrose 50% Injectable 25 Gram(s) IV Push once  digoxin     Tablet 125 MICROGram(s) Oral daily  diltiazem    milliGRAM(s) Oral daily  furosemide    Tablet 40 milliGRAM(s) Oral daily  gabapentin 100 milliGRAM(s) Oral daily  glucagon  Injectable 1 milliGRAM(s) IntraMuscular once  insulin glargine Injectable (LANTUS) 10 Unit(s) SubCutaneous every morning  insulin lispro (ADMELOG) corrective regimen sliding scale   SubCutaneous three times a day before meals  insulin lispro (ADMELOG) corrective regimen sliding scale   SubCutaneous at bedtime  lactobacillus acidophilus 1 Tablet(s) Oral two times a day  metoprolol tartrate 100 milliGRAM(s) Oral two times a day  mirtazapine 15 milliGRAM(s) Oral at bedtime  pantoprazole    Tablet 40 milliGRAM(s) Oral before breakfast  risperiDONE   Tablet 0.5 milliGRAM(s) Oral three times a day  tamsulosin 0.4 milliGRAM(s) Oral at bedtime    MEDICATIONS  (PRN):  haloperidol    Injectable 2.5 milliGRAM(s) IntraMuscular every 6 hours PRN Agitation  OLANZapine Injectable 5 milliGRAM(s) IntraMuscular every 6 hours PRN Acute agitation      Allergies    aspirin (Anaphylaxis)  penicillin (Rash)  strawberry (Rash)    Intolerances        Vital Signs Last 24 Hrs  T(C): 36.3 (2022 10:42), Max: 37.1 (2022 17:36)  T(F): 97.4 (2022 10:42), Max: 98.7 (2022 17:36)  HR: 90 (2022 10:42) (90 - 120)  BP: 111/75 (2022 10:42) (107/70 - 128/66)  BP(mean): --  RR: 19 (2022 10:42) (17 - 25)  SpO2: 94% (2022 10:42) (93% - 98%)    PHYSICAL EXAM:  GENERAL: NAD, elderly,  HEAD:  Atraumatic, Normocephalic  EYES: EOMI, conjunctiva and sclera clear  ENMT: Moist mucous membranes,   NECK: Supple, No JVD   NERVOUS SYSTEM:  lethargic, arousable with touch only, localizes to pain  CHEST/LUNG: dec bs in bases; No rales, rhonchi, wheezing, or rubs  HEART: Regular rate and rhythm; No murmurs  ABDOMEN: Soft, Nontender, Nondistended; Bowel sounds present  EXTREMITIES:  + Peripheral Pulses, No clubbing, cyanosis, or edema  LYMPH: No lymphadenopathy noted  SKIN: No rashes or lesions      LABS:                        13.2   15.11 )-----------( 268      ( 2022 07:56 )             41.0     2022 07:56    139    |  99     |  72     ----------------------------<  210    4.3     |  30     |  3.60     Ca    7.8        2022 07:56    TPro  6.3    /  Alb  2.6    /  TBili  1.0    /  DBili  x      /  AST  21     /  ALT  10     /  AlkPhos  145    2022 07:56      Urinalysis Basic - ( 2022 08:18 )    Color: Yellow / Appearance: Slightly Turbid / S.020 / pH: x  Gluc: x / Ketone: Negative  / Bili: Small / Urobili: Negative mg/dL   Blood: x / Protein: 30 mg/dL / Nitrite: Negative   Leuk Esterase: Small / RBC: >50 /HPF / WBC 3-5   Sq Epi: x / Non Sq Epi: Occasional / Bacteria: Moderate      CAPILLARY BLOOD GLUCOSE      POCT Blood Glucose.: 203 mg/dL (2022 07:50)  POCT Blood Glucose.: 180 mg/dL (2022 21:07)  POCT Blood Glucose.: 135 mg/dL (2022 16:32)  POCT Blood Glucose.: 207 mg/dL (2022 12:23)            CAPILLARY BLOOD GLUCOSE      POCT Blood Glucose.: 241 mg/dL (2022 08:05)  POCT Blood Glucose.: 196 mg/dL (2022 21:19)  POCT Blood Glucose.: 193 mg/dL (2022 16:56)  POCT Blood Glucose.: 212 mg/dL (2022 12:01)      89 y/o M with PMH of HTN, DM type 2 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 Fib, DVT on Eliquis, CKD stage 3, BPH, Malignant Melanoma, Basal Cell Carcinoma, Diverticulitis, Obesity, and Dementia presented with AMS & agitation.        Problem/Plan - 1:  ·  Delirium superimposed on dementia. intermittent.  ·  Plan: ML worsening of his dementia, now with lethargy likely infectious metabolic encephalopathy superimposed on psych meds, concern for aspiration given mentation, initially ordered CXR, pending results, UA not appearing infectious,   discussed with son sabino at length, family requesting not to pursue further life prolonging treatment including antibiotics. willing to continue arranging for home hospice.   making comfort measures only.     Problem/Plan - 2:  ·  Problem: DM (diabetes mellitus), type 2, uncontrolled. A1c 7.1  ·  Plan:  holding Januvia & Tradjenta   on  Insulin sliding scale, Insulin lantus  will stop. can continue to hold home medications on DC if patient awakens     Problem/Plan - 3  ·  Problem: Chronic systolic congestive heart failure.   ·  Plan: stable, continue PO Lasix 40mg po daily     Metoprolol 100mg po bid - only give if patient is awake to tolerate.   - restarted on digoxin     Problem/Plan - 5:  ·  Problem: History of DVT in adulthood.   ·  Plan: continue on renal dose Eliquis.     Problem/Plan - 6:  ·  Problem: Chronic atrial fibrillation.   ·  Plan: Rate controlled on Metoprolol & Diltiazem CR,     on Eliquis for recent left femoral DVT.     Problem/Plan - 7:  ·  Problem: Stage 3 chronic kidney disease.   ·  Plan: with HARESH on CKD, no further treatment as per family request     Problem/Plan - 8:  ·  Problem: BPH without urinary obstruction.   ·  Plan: Continue  Flomax     Problem/Plan - 9:  ·  Problem: Need for prophylactic measure.   ·  Plan: High risk for recurrent VTE, already anticoagulated on Eliquis for recent left femoral DVT, continue on renal dose.    Disposition:  Palliative care dr Majano discussed with son Sabino.  For hospice at  home. Supportive care .  DNR/DNI

## 2022-02-21 NOTE — CHART NOTE - NSCHARTNOTEFT_GEN_A_CORE
Assessment:     Pt seen for nutrition follow-up. Pt is a "89 y/o M with PMH of HTN, DM type 2 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 Fib, DVT on Eliquis, CKD stage 3, BPH, Malignant Melanoma, Basal Cell Carcinoma, Diverticulitis, Obesity, and Dementia who presented with AMS & agitation over the past 2 days, son reports longstanding dementia but rapid decline over the past month, was agitated & more confused over the past 2 days, son unable to take care of him & request placement. Patient is non communication, was agitated & received Ativan 0.5 mg IM X1, currently asleep but easily arousable, confused, then returns back to sleep."    Visited pt this morning, pt sleeping in bed during time of visit. Pt previously seen for nutrition assessment secondary to chronic CHF and > A1c (8.7%). Diet previously initiated to regular consistency (con CHO, DASH/TLC diet), however, was downgraded to pureed consistency given presentation (admitted with worsening dementia and general decline). Pt was seen for swallow evaluation on 2/19- recommended puree consistency diet with moderately thick liquids. Diet order currently active for puree (new diet order pending- diet office aware of need for mod thick liquids and con cho, low sodium diet restriction). Observed meal tray at bedside this am, pt consumed puree bananas and some thickened fluids. PO intakes variable throughout admission, ranging from 0-100% per RN documentation, overall good po intake. Pt requires total assistance at mealtimes. +BM 2/20. [place] allergy noted. Providing magic cup fortified ice cream BID for additional kcal/protein and to supplement variable po intake (290kcal, 9g protein per 4 oz cup). Skin: stage I pressure ulcer to sacrum. Diet education (heart failure, DM, modified consistency) not appropriate secondary to hx dementia/prognosis- plan for home hospice. Palliative care following. Pt DNR/DNI, per MD note, "family requesting not to pursue further life prolonging treatment including antibiotics. willing to continue arranging for home hospice. making comfort measures only." RD will continue to follow-up and monitor pt's nutrition status.      Factors impacting intake: [ ] none [ ] nausea  [ ] vomiting [ ] diarrhea [ ] constipation  [X]chewing problems [X] swallowing issues  [ ] other:     Diet Prescription: Diet, Pureed (02-17-22 @ 14:46)    Intake: variable po intake throughout adm, overall good po intake    Current Weight: Weight (kg): 84.912 (02-17 @ 04:46)  % Weight Change  2/17 187.2#    Pertinent Medications: MEDICATIONS  (STANDING):  apixaban 2.5 milliGRAM(s) Oral two times a day  digoxin     Tablet 125 MICROGram(s) Oral daily  diltiazem    milliGRAM(s) Oral daily  furosemide    Tablet 40 milliGRAM(s) Oral daily  gabapentin 100 milliGRAM(s) Oral daily  glucagon  Injectable 1 milliGRAM(s) IntraMuscular once  metoprolol tartrate 100 milliGRAM(s) Oral two times a day  mirtazapine 15 milliGRAM(s) Oral at bedtime  pantoprazole    Tablet 40 milliGRAM(s) Oral before breakfast  tamsulosin 0.4 milliGRAM(s) Oral at bedtime    MEDICATIONS  (PRN):  acetaminophen  Suppository .. 650 milliGRAM(s) Rectal every 6 hours PRN Temp greater or equal to 38C (100.4F)  haloperidol    Injectable 2.5 milliGRAM(s) IntraMuscular every 6 hours PRN Agitation  OLANZapine Injectable 5 milliGRAM(s) IntraMuscular every 6 hours PRN Acute agitation    Pertinent Labs: 02-21 Na139 mmol/L Glu 210 mg/dL<H> K+ 4.3 mmol/L Cr  3.60 mg/dL<H> BUN 72 mg/dL<H> 02-21 Alb 2.6 g/dL<L>    CAPILLARY BLOOD GLUCOSE  POCT Blood Glucose.: 200 mg/dL (21 Feb 2022 11:55)  POCT Blood Glucose.: 203 mg/dL (21 Feb 2022 07:50)  POCT Blood Glucose.: 180 mg/dL (20 Feb 2022 21:07)  POCT Blood Glucose.: 135 mg/dL (20 Feb 2022 16:32)    Skin: pressure ulcer; stage I to sacrum, skin tear; BL elbows    Estimated Needs:   [X] no change since previous assessment  [ ] recalculated:     Previous Nutrition Diagnosis:   [ ] Inadequate Energy Intake [ ]Inadequate Oral Intake [ ] Excessive Energy Intake   [ ] Underweight [ ] Increased Nutrient Needs [ ] Overweight/Obesity [X] Swallowing Difficulty  [ ] Altered GI Function [ ] Unintended Weight Loss [ ] Food & Nutrition Related Knowledge Deficit [ ] Malnutrition     Nutrition Diagnosis is [X] ongoing  [ ] resolved [ ] not applicable     New Nutrition Diagnosis: [ ] not applicable       Interventions: Continue nutrition care plan, con cho puree diet, moderately thick liquids, magic cup fortified ice cream BID, total assistance at mealtimes  Recommend  [ ] Change Diet To:  [ ] Nutrition Supplement  [ ] Nutrition Support  [ ] Other:     Monitoring and Evaluation:   [X] PO intake [ x ] Tolerance to diet prescription [ x ] weights [ x ] labs[ x ] follow up per protocol  [ ] other:

## 2022-02-21 NOTE — PROGRESS NOTE ADULT - SUBJECTIVE AND OBJECTIVE BOX
Chief Complaint: AMS    Interval Events: No events overnight.    Review of Systems:  General: No fevers, chills, weight gain  Skin: No rashes, color changes  Cardiovascular: No chest pain, orthopnea  Respiratory: No shortness of breath, cough  Gastrointestinal: No nausea, abdominal pain  Genitourinary: No incontinence, pain with urination  Musculoskeletal: No pain, swelling, decreased range of motion  Neurological: No headache, weakness  Psychiatric: No depression, anxiety  Endocrine: No weight gain, increased thirst  All other systems are comprehensively negative.    Physical Exam:  Vital Signs Last 24 Hrs  T(C): 37.7 (21 Feb 2022 10:30), Max: 38.4 (20 Feb 2022 18:30)  T(F): 99.9 (21 Feb 2022 10:30), Max: 101.1 (20 Feb 2022 18:30)  HR: 78 (21 Feb 2022 09:52) (78 - 102)  BP: 97/66 (21 Feb 2022 09:52) (97/66 - 123/68)  BP(mean): --  RR: 18 (21 Feb 2022 09:52) (18 - 18)  SpO2: 94% (21 Feb 2022 09:52) (94% - 95%)  General: NAD  HEENT: MMM  Neck: No JVD, no carotid bruit  Lungs: CTAB  CV: RRR, nl S1/S2, no M/R/G  Abdomen: S/NT/ND, +BS  Extremities: No LE edema, no cyanosis  Neuro: AAOx1  Skin: No rash    Labs:             02-21    139  |  99  |  72<H>  ----------------------------<  210<H>  4.3   |  30  |  3.60<H>    Ca    7.8<L>      21 Feb 2022 07:56    TPro  6.3  /  Alb  2.6<L>  /  TBili  1.0  /  DBili  x   /  AST  21  /  ALT  10  /  AlkPhos  145<H>  02-21                        13.2   15.11 )-----------( 268      ( 21 Feb 2022 07:56 )             41.0       Telemetry: AF

## 2022-02-21 NOTE — PROGRESS NOTE ADULT - SUBJECTIVE AND OBJECTIVE BOX
Neurology follow up note    SAJI LEHMAN89yMale      Interval History:    Patient sleeping in bed     Allergies    aspirin (Anaphylaxis)  penicillin (Rash)  strawberry (Rash)    Intolerances        MEDICATIONS    acetaminophen  Suppository .. 650 milliGRAM(s) Rectal every 6 hours PRN  apixaban 2.5 milliGRAM(s) Oral two times a day  atorvastatin 20 milliGRAM(s) Oral at bedtime  dextrose 40% Gel 15 Gram(s) Oral once  dextrose 5%. 1000 milliLiter(s) IV Continuous <Continuous>  dextrose 5%. 1000 milliLiter(s) IV Continuous <Continuous>  dextrose 50% Injectable 25 Gram(s) IV Push once  dextrose 50% Injectable 12.5 Gram(s) IV Push once  dextrose 50% Injectable 25 Gram(s) IV Push once  digoxin     Tablet 125 MICROGram(s) Oral daily  diltiazem    milliGRAM(s) Oral daily  furosemide    Tablet 40 milliGRAM(s) Oral daily  gabapentin 100 milliGRAM(s) Oral daily  glucagon  Injectable 1 milliGRAM(s) IntraMuscular once  haloperidol    Injectable 2.5 milliGRAM(s) IntraMuscular every 6 hours PRN  insulin glargine Injectable (LANTUS) 10 Unit(s) SubCutaneous every morning  insulin lispro (ADMELOG) corrective regimen sliding scale   SubCutaneous three times a day before meals  lactobacillus acidophilus 1 Tablet(s) Oral two times a day  metoprolol tartrate 100 milliGRAM(s) Oral two times a day  mirtazapine 15 milliGRAM(s) Oral at bedtime  OLANZapine Injectable 5 milliGRAM(s) IntraMuscular every 6 hours PRN  pantoprazole    Tablet 40 milliGRAM(s) Oral before breakfast  tamsulosin 0.4 milliGRAM(s) Oral at bedtime              Vital Signs Last 24 Hrs  T(C): 37 (2022 05:21), Max: 38.4 (2022 18:30)  T(F): 98.6 (2022 05:21), Max: 101.1 (2022 18:30)  HR: 102 (2022 05:21) (71 - 102)  BP: 123/68 (2022 05:21) (115/58 - 123/68)  BP(mean): --  RR: 18 (2022 05:21) (16 - 18)  SpO2: 94% (2022 05:21) (94% - 96%)      REVIEW OF SYSTEMS:  Could not be obtained from the patient secondary to the patient being altered.    PHYSICAL EXAMINATION:   HEENT:  Head:  Normocephalic and atraumatic.  Eyes:  No scleral icterus.  Ears:  Hard to evaluate secondary to the patient being altered.  NECK:  Had increased tone, but is resisting.  CARDIOVASCULAR:  S1 and S2 heard.  RESPIRATORY:  Decreased breath sounds bilaterally, gives poor effort.  ABDOMEN:  Soft and nontender.  EXTREMITIES:  No clubbing or cyanosis were noted.      NEUROLOGIC:  The patient was awake in bed  To verbal stimuli, positive eye opening, on and off blink to visual threat. speech able to speak more     Motor:  I attempted to elevate bilateral upper extremities, the patient would actively resist, pull both arms down, would say 4/5.  Bilateral lower extremities, slight flexion at the hip and knee, but does have significantly increased tone.  The patient is mostly sedentary, nonambulatory.  appears [facility]               LABS:  CBC Full  -  ( 2022 07:56 )  WBC Count : 15.11 K/uL  RBC Count : 4.45 M/uL  Hemoglobin : 13.2 g/dL  Hematocrit : 41.0 %  Platelet Count - Automated : 268 K/uL  Mean Cell Volume : 92.1 fl  Mean Cell Hemoglobin : 29.7 pg  Mean Cell Hemoglobin Concentration : 32.2 gm/dL  Auto Neutrophil # : x  Auto Lymphocyte # : x  Auto Monocyte # : x  Auto Eosinophil # : x  Auto Basophil # : x  Auto Neutrophil % : x  Auto Lymphocyte % : x  Auto Monocyte % : x  Auto Eosinophil % : x  Auto Basophil % : x    Urinalysis Basic - ( 2022 08:18 )    Color: Yellow / Appearance: Slightly Turbid / S.020 / pH: x  Gluc: x / Ketone: Negative  / Bili: Small / Urobili: Negative mg/dL   Blood: x / Protein: 30 mg/dL / Nitrite: Negative   Leuk Esterase: Small / RBC: >50 /HPF / WBC 3-5   Sq Epi: x / Non Sq Epi: Occasional / Bacteria: Moderate          139  |  99  |  72<H>  ----------------------------<  210<H>  4.3   |  30  |  3.60<H>    Ca    7.8<L>      2022 07:56    TPro  6.3  /  Alb  2.6<L>  /  TBili  1.0  /  DBili  x   /  AST  21  /  ALT  10  /  AlkPhos  145<H>      Hemoglobin A1C:     LIVER FUNCTIONS - ( 2022 07:56 )  Alb: 2.6 g/dL / Pro: 6.3 g/dL / ALK PHOS: 145 U/L / ALT: 10 U/L DA / AST: 21 U/L / GGT: x           Vitamin B12         RADIOLOGY    ANALYSIS AND PLAN:  A 89-year-old with episode of altered mental status.  For episode of altered mental status, questionable this could be metabolic encephalopathy from recent addition of digoxin cardiology noted since not toxic digoxin not playing a role in behavior  .  Examination is severely limited, but I do not see any new clear sign to suggest a new cerebrovascular accident has ensued.  For history of dementia, secondary to the patient's age, supportive therapy.  Atrial fibrillation, anticoagulation as needed.  Hypertension, monitor systolic blood pressure.  Hyperlipidemia, continue the patient on statin.  For diabetes, strict control of blood sugars.  for temperatures infection work up as needed   Spoke with son, Sabino, who says that he would be the primary contact, his telephone number is [phone] spoke to son today   Psychiatry followup as needed medication adjusted     Greater than 40 minutes of time was spent with the patient, plan of care, reviewing data, and speaking to multidisciplinary healthcare team with greater than 50% of that time in counseling and care coordination.    Thank you for the courtesy of this consultation.

## 2022-02-22 LAB
CULTURE RESULTS: SIGNIFICANT CHANGE UP
SPECIMEN SOURCE: SIGNIFICANT CHANGE UP

## 2022-02-22 PROCEDURE — 99232 SBSQ HOSP IP/OBS MODERATE 35: CPT

## 2022-02-22 RX ORDER — HALOPERIDOL DECANOATE 100 MG/ML
2.5 INJECTION INTRAMUSCULAR EVERY 12 HOURS
Refills: 0 | Status: DISCONTINUED | OUTPATIENT
Start: 2022-02-22 | End: 2022-02-26

## 2022-02-22 RX ADMIN — APIXABAN 2.5 MILLIGRAM(S): 2.5 TABLET, FILM COATED ORAL at 08:45

## 2022-02-22 RX ADMIN — MIRTAZAPINE 15 MILLIGRAM(S): 45 TABLET, ORALLY DISINTEGRATING ORAL at 21:32

## 2022-02-22 RX ADMIN — Medication 100 MILLIGRAM(S): at 08:18

## 2022-02-22 RX ADMIN — Medication 240 MILLIGRAM(S): at 06:46

## 2022-02-22 RX ADMIN — Medication 100 MILLIGRAM(S): at 18:28

## 2022-02-22 RX ADMIN — HALOPERIDOL DECANOATE 2.5 MILLIGRAM(S): 100 INJECTION INTRAMUSCULAR at 21:32

## 2022-02-22 RX ADMIN — TAMSULOSIN HYDROCHLORIDE 0.4 MILLIGRAM(S): 0.4 CAPSULE ORAL at 21:34

## 2022-02-22 RX ADMIN — APIXABAN 2.5 MILLIGRAM(S): 2.5 TABLET, FILM COATED ORAL at 21:32

## 2022-02-22 RX ADMIN — Medication 125 MICROGRAM(S): at 06:46

## 2022-02-22 RX ADMIN — Medication 40 MILLIGRAM(S): at 06:46

## 2022-02-22 RX ADMIN — GABAPENTIN 100 MILLIGRAM(S): 400 CAPSULE ORAL at 12:19

## 2022-02-22 NOTE — PROGRESS NOTE ADULT - ASSESSMENT
89 y/o M with PMH of HTN, DM type 2 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 Fib, DVT on Eliquis, CKD stage 3, BPH, Malignant Melanoma, Basal Cell Carcinoma, Diverticulitis, Obesity, and Dementia presented with AMS & agitation.       Delirium superimposed on dementia. intermittent.  ML worsening of his dementia, now with lethargy likely infectious metabolic encephalopathy superimposed on psych meds, concern for aspiration given mentation, UA not appearing infectious discussed with son xochilt at length, family requesting not to pursue further life prolonging treatment including antibiotics. willing to continue arranging for home hospice.   PLaN:  comfort measures only.    DM (diabetes mellitus), type 2, uncontrolled. A1c 7.1  holding Januvia & Tradjenta   on  Insulin sliding scale, Insulin lantus  will stop. can continue to hold home medications on DC if patient awakens    Chronic systolic congestive heart failure.   stable, continue PO Lasix 40mg po daily   Metoprolol 100mg po bid - only give if patient is awake to tolerate.   restarted on digoxin    History of DVT in adulthood.   ·  Plan: continue on renal dose Eliquis.    Chronic atrial fibrillation.   ·  Plan: Rate controlled on Metoprolol & Diltiazem CR,     on Eliquis for recent left femoral DVT.    Stage 3 chronic kidney disease.   HARESH on CKD  no further treatment as per family request    BPH without urinary obstruction.   ·  Plan: Continue  Flomax    DVT PPX: eliquis    Disposition:  COMFORT CARE 89 y/o M with PMH of HTN, DM type 2 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 Fib, DVT on Eliquis, CKD stage 3, BPH, Malignant Melanoma, Basal Cell Carcinoma, Diverticulitis, Obesity, and Dementia presented with AMS & agitation.       Delirium superimposed on dementia. intermittent.  ML worsening of his dementia, now with lethargy likely infectious metabolic encephalopathy superimposed on psych meds, concern for aspiration given mentation, UA not appearing infectious discussed with son xochilt at length, family requesting not to pursue further life prolonging treatment including antibiotics. willing to continue arranging for home hospice.   PLaN:  comfort measures only.    DM (diabetes mellitus), type 2, uncontrolled. A1c 7.1  holding Januvia & Tradjenta   on  Insulin sliding scale, Insulin lantus  will stop. can continue to hold home medications on DC if patient awakens    Chronic systolic congestive heart failure.   stable, continue PO Lasix 40mg po daily   Metoprolol 100mg po bid - only give if patient is awake to tolerate.   restarted on digoxin    History of DVT in adulthood.   ·  Plan: continue on renal dose Eliquis.    Chronic atrial fibrillation.   ·  Plan: Rate controlled on Metoprolol & Diltiazem CR,     on Eliquis for recent left femoral DVT.    Stage 3 chronic kidney disease.   HARESH on CKD  no further treatment as per family request    BPH without urinary obstruction.   ·  Plan: Continue  Flomax    DVT PPX: eliquis    Disposition:  COMFORT CARE. for home hospice.; Family refusing long term care  due to aid resource avaiability, aids unable to visit home until next week  son asking for oral treatment for agitation  noted patient has not taken zyprexa over 24 hours  changed IV haldol to PO

## 2022-02-22 NOTE — PROGRESS NOTE ADULT - SUBJECTIVE AND OBJECTIVE BOX
Patient seen and examined at bedside.  Lethargic today. not answering questions. arousable with touch. low grade temp overnight 99  on Comfort Care. for Hospce    Review of Systems:  unable to obtain due to lethargy and poor mental status  Objective:  Vitals  T(C): 36.8 (02-22-22 @ 10:07), Max: 36.8 (02-22-22 @ 10:07)  HR: 76 (02-22-22 @ 10:07) (76 - 80)  BP: 132/69 (02-22-22 @ 10:07) (120/80 - 132/69)  RR: 18 (02-22-22 @ 10:07) (18 - 18)  SpO2: 95% (02-22-22 @ 10:07) (95% - 95%)    Physical Exam:  General: lethargic, elderly, weak appearing  HEENT: Atraumatic, no LAD, trachea midline, PERRLA  Cardiovascular: normal s1s2, no murmurs, gallops or fricition rubs  Pulmonary: clear to ausculation Bilaterally, no wheezing , rhonchi  Gastrointestinal: soft non tender non distended, no masses felt, no organomegally  Muscloskeletal: no lower extremity edema, intact bilateral lower extremity pulses  Neurological: CN II-12 intact. No focal weakness  Psychiatrical: normal mood, cooperative  SKIN: no rash, lesions or ulcers    Labs:                          13.2   15.11 )-----------( 268      ( 21 Feb 2022 07:56 )             41.0     02-21    139  |  99  |  72<H>  ----------------------------<  210<H>  4.3   |  30  |  3.60<H>    Ca    7.8<L>      21 Feb 2022 07:56    TPro  6.3  /  Alb  2.6<L>  /  TBili  1.0  /  DBili  x   /  AST  21  /  ALT  10  /  AlkPhos  145<H>  02-21    LIVER FUNCTIONS - ( 21 Feb 2022 07:56 )  Alb: 2.6 g/dL / Pro: 6.3 g/dL / ALK PHOS: 145 U/L / ALT: 10 U/L DA / AST: 21 U/L / GGT: x                 Active Medications  MEDICATIONS  (STANDING):  apixaban 2.5 milliGRAM(s) Oral two times a day  digoxin     Tablet 125 MICROGram(s) Oral daily  diltiazem    milliGRAM(s) Oral daily  furosemide    Tablet 40 milliGRAM(s) Oral daily  gabapentin 100 milliGRAM(s) Oral daily  glucagon  Injectable 1 milliGRAM(s) IntraMuscular once  metoprolol tartrate 100 milliGRAM(s) Oral two times a day  mirtazapine 15 milliGRAM(s) Oral at bedtime  pantoprazole    Tablet 40 milliGRAM(s) Oral before breakfast  tamsulosin 0.4 milliGRAM(s) Oral at bedtime    MEDICATIONS  (PRN):  acetaminophen  Suppository .. 650 milliGRAM(s) Rectal every 6 hours PRN Temp greater or equal to 38C (100.4F)  haloperidol    Injectable 2.5 milliGRAM(s) IntraMuscular every 6 hours PRN Agitation  OLANZapine Injectable 5 milliGRAM(s) IntraMuscular every 6 hours PRN Acute agitation     Patient seen and examined at bedside.  Lethargic today. not answering questions. arousable with touch. low grade temp overnight 99  on Comfort Care. for home Hospce    Review of Systems:  General:denies fever chills, headache, weakness  HEENT: denies blurry vision,diffculty swallowing, difficulty hearing, tinnitus  Cardiovascular: denies chest pain  ,palpitations  Pulmonary:denies shortness of breath, cough, wheezing, hemoptysis  Gastrointestinal: denies abdominal pain, constipation, diarrhea,nausea , vomiting, hematochezia  : denies hematuria, dysuria, or incontinence  Neurological: denies weakness, numbness , tingling, dizziness, tremors  MSK: denies muscle pain, difficulty ambulating, swelling, back pain  skin: denies skin rash, itching, burning, or  skin lesions  Psychiatrical: denies mood disturbances, anxierty, feeling depressed, depression , or difficulty sleeping    Objective:  Vitals  T(C): 36.8 (02-22-22 @ 10:07), Max: 36.8 (02-22-22 @ 10:07)  HR: 76 (02-22-22 @ 10:07) (76 - 80)  BP: 132/69 (02-22-22 @ 10:07) (120/80 - 132/69)  RR: 18 (02-22-22 @ 10:07) (18 - 18)  SpO2: 95% (02-22-22 @ 10:07) (95% - 95%)    Physical Exam:  General: lethargic, elderly, weak appearing  HEENT: Atraumatic, no LAD, trachea midline, PERRLA  Cardiovascular: normal s1s2, no murmurs, gallops or fricition rubs  Pulmonary: clear to ausculation Bilaterally, no wheezing , rhonchi  Gastrointestinal: soft non tender non distended, no masses felt, no organomegally  Muscloskeletal: no lower extremity edema, intact bilateral lower extremity pulses  Neurological: CN II-12 intact. No focal weakness  Psychiatrical: normal mood, cooperative  SKIN: no rash, lesions or ulcers    Labs:                          13.2   15.11 )-----------( 268      ( 21 Feb 2022 07:56 )             41.0     02-21    139  |  99  |  72<H>  ----------------------------<  210<H>  4.3   |  30  |  3.60<H>    Ca    7.8<L>      21 Feb 2022 07:56    TPro  6.3  /  Alb  2.6<L>  /  TBili  1.0  /  DBili  x   /  AST  21  /  ALT  10  /  AlkPhos  145<H>  02-21    LIVER FUNCTIONS - ( 21 Feb 2022 07:56 )  Alb: 2.6 g/dL / Pro: 6.3 g/dL / ALK PHOS: 145 U/L / ALT: 10 U/L DA / AST: 21 U/L / GGT: x                 Active Medications  MEDICATIONS  (STANDING):  apixaban 2.5 milliGRAM(s) Oral two times a day  digoxin     Tablet 125 MICROGram(s) Oral daily  diltiazem    milliGRAM(s) Oral daily  furosemide    Tablet 40 milliGRAM(s) Oral daily  gabapentin 100 milliGRAM(s) Oral daily  glucagon  Injectable 1 milliGRAM(s) IntraMuscular once  metoprolol tartrate 100 milliGRAM(s) Oral two times a day  mirtazapine 15 milliGRAM(s) Oral at bedtime  pantoprazole    Tablet 40 milliGRAM(s) Oral before breakfast  tamsulosin 0.4 milliGRAM(s) Oral at bedtime    MEDICATIONS  (PRN):  acetaminophen  Suppository .. 650 milliGRAM(s) Rectal every 6 hours PRN Temp greater or equal to 38C (100.4F)  haloperidol    Injectable 2.5 milliGRAM(s) IntraMuscular every 6 hours PRN Agitation  OLANZapine Injectable 5 milliGRAM(s) IntraMuscular every 6 hours PRN Acute agitation

## 2022-02-22 NOTE — PROGRESS NOTE ADULT - SUBJECTIVE AND OBJECTIVE BOX
Chief Complaint: AMS    Interval Events: No events overnight.    Review of Systems:  General: No fevers, chills, weight gain  Skin: No rashes, color changes  Cardiovascular: No chest pain, orthopnea  Respiratory: No shortness of breath, cough  Gastrointestinal: No nausea, abdominal pain  Genitourinary: No incontinence, pain with urination  Musculoskeletal: No pain, swelling, decreased range of motion  Neurological: No headache, weakness  Psychiatric: No depression, anxiety  Endocrine: No weight gain, increased thirst  All other systems are comprehensively negative.    Physical Exam:  Vital Signs Last 24 Hrs  T(C): 37.7 (21 Feb 2022 10:30), Max: 37.7 (21 Feb 2022 10:30)  T(F): 99.9 (21 Feb 2022 10:30), Max: 99.9 (21 Feb 2022 10:30)  HR: 80 (22 Feb 2022 06:47) (78 - 80)  BP: 120/80 (22 Feb 2022 06:47) (97/66 - 120/80)  BP(mean): --  RR: 18 (21 Feb 2022 09:52) (18 - 18)  SpO2: 94% (21 Feb 2022 09:52) (94% - 94%)  General: NAD  HEENT: MMM  Neck: No JVD, no carotid bruit  Lungs: CTAB  CV: RRR, nl S1/S2, no M/R/G  Abdomen: S/NT/ND, +BS  Extremities: No LE edema, no cyanosis  Neuro: AAOx1  Skin: No rash    Labs:             02-21    139  |  99  |  72<H>  ----------------------------<  210<H>  4.3   |  30  |  3.60<H>    Ca    7.8<L>      21 Feb 2022 07:56    TPro  6.3  /  Alb  2.6<L>  /  TBili  1.0  /  DBili  x   /  AST  21  /  ALT  10  /  AlkPhos  145<H>  02-21                        13.2   15.11 )-----------( 268      ( 21 Feb 2022 07:56 )             41.0         Telemetry: AF

## 2022-02-22 NOTE — PROGRESS NOTE ADULT - SUBJECTIVE AND OBJECTIVE BOX
Neurology follow up note    SAJI LEHMAN89yMale      Interval History:    Patient feels ok no new complaints.    Allergies    aspirin (Anaphylaxis)  penicillin (Rash)  strawberry (Rash)    Intolerances        MEDICATIONS    acetaminophen  Suppository .. 650 milliGRAM(s) Rectal every 6 hours PRN  apixaban 2.5 milliGRAM(s) Oral two times a day  digoxin     Tablet 125 MICROGram(s) Oral daily  diltiazem    milliGRAM(s) Oral daily  furosemide    Tablet 40 milliGRAM(s) Oral daily  gabapentin 100 milliGRAM(s) Oral daily  glucagon  Injectable 1 milliGRAM(s) IntraMuscular once  haloperidol    Injectable 2.5 milliGRAM(s) IntraMuscular every 6 hours PRN  metoprolol tartrate 100 milliGRAM(s) Oral two times a day  mirtazapine 15 milliGRAM(s) Oral at bedtime  OLANZapine Injectable 5 milliGRAM(s) IntraMuscular every 6 hours PRN  pantoprazole    Tablet 40 milliGRAM(s) Oral before breakfast  tamsulosin 0.4 milliGRAM(s) Oral at bedtime              Vital Signs Last 24 Hrs  T(C): 36.8 (2022 10:07), Max: 36.8 (2022 10:07)  T(F): 98.2 (2022 10:07), Max: 98.2 (2022 10:07)  HR: 76 (2022 10:07) (76 - 80)  BP: 132/69 (2022 10:07) (120/80 - 132/69)  BP(mean): --  RR: 18 (2022 10:07) (18 - 18)  SpO2: 95% (2022 10:07) (95% - 95%)        REVIEW OF SYSTEMS:  Could not be obtained from the patient secondary to the patient being altered.    PHYSICAL EXAMINATION:   HEENT:  Head:  Normocephalic and atraumatic.  Eyes:  No scleral icterus.  Ears:  Hard to evaluate secondary to the patient being altered.  NECK:  Had increased tone, but is resisting.  CARDIOVASCULAR:  S1 and S2 heard.  RESPIRATORY:  Decreased breath sounds bilaterally, gives poor effort.  ABDOMEN:  Soft and nontender.  EXTREMITIES:  No clubbing or cyanosis were noted.      NEUROLOGIC:  The patient was awake in bed  To verbal stimuli, positive eye opening, on and off blink to visual threat. speech able to speak more     Motor:  I attempted to elevate bilateral upper extremities, the patient would actively resist, pull both arms down, would say 4/5.  Bilateral lower extremities, slight flexion at the hip and knee, but does have significantly increased tone.  The patient is mostly sedentary, nonambulatory.  appears [place]  was able to say 2 sons name today              LABS:  CBC Full  -  ( 2022 07:56 )  WBC Count : 15.11 K/uL  RBC Count : 4.45 M/uL  Hemoglobin : 13.2 g/dL  Hematocrit : 41.0 %  Platelet Count - Automated : 268 K/uL  Mean Cell Volume : 92.1 fl  Mean Cell Hemoglobin : 29.7 pg  Mean Cell Hemoglobin Concentration : 32.2 gm/dL  Auto Neutrophil # : x  Auto Lymphocyte # : x  Auto Monocyte # : x  Auto Eosinophil # : x  Auto Basophil # : x  Auto Neutrophil % : x  Auto Lymphocyte % : x  Auto Monocyte % : x  Auto Eosinophil % : x  Auto Basophil % : x    Urinalysis Basic - ( 2022 08:18 )    Color: Yellow / Appearance: Slightly Turbid / S.020 / pH: x  Gluc: x / Ketone: Negative  / Bili: Small / Urobili: Negative mg/dL   Blood: x / Protein: 30 mg/dL / Nitrite: Negative   Leuk Esterase: Small / RBC: >50 /HPF / WBC 3-5   Sq Epi: x / Non Sq Epi: Occasional / Bacteria: Moderate          139  |  99  |  72<H>  ----------------------------<  210<H>  4.3   |  30  |  3.60<H>    Ca    7.8<L>      2022 07:56    TPro  6.3  /  Alb  2.6<L>  /  TBili  1.0  /  DBili  x   /  AST  21  /  ALT  10  /  AlkPhos  145<H>  -    Hemoglobin A1C:     LIVER FUNCTIONS - ( 2022 07:56 )  Alb: 2.6 g/dL / Pro: 6.3 g/dL / ALK PHOS: 145 U/L / ALT: 10 U/L DA / AST: 21 U/L / GGT: x           Vitamin B12         RADIOLOGY    ANALYSIS AND PLAN:  A 89-year-old with episode of altered mental status.  For episode of altered mental status, questionable this could be metabolic encephalopathy from recent addition of digoxin cardiology noted since not toxic digoxin not playing a role in behavior  .  Examination is severely limited, but I do not see any new clear sign to suggest a new cerebrovascular accident has ensued.  For history of dementia, secondary to the patient's age, supportive therapy.  Atrial fibrillation, anticoagulation as needed.  Hypertension, monitor systolic blood pressure.  Hyperlipidemia, continue the patient on statin.  For diabetes, strict control of blood sugars.  for temperatures infection work up as needed   monitor out put as needed   Spoke with son, Sabino, who says that he would be the primary contact, his telephone number is [phone] spoke to son today  today went to [facility]   Psychiatry followup as needed medication adjusted     Greater than 34 minutes of time was spent with the patient, plan of care, reviewing data, and speaking to multidisciplinary healthcare team with greater than 50% of that time in counseling and care coordination.    Thank you for the courtesy of this consultation.

## 2022-02-22 NOTE — PROGRESS NOTE ADULT - ASSESSMENT
The patient is a 89 year old male with a history of HTN, HL, DM, CKD, carotid stenosis, DVT, atrial fibrillation, chronic systolic heart failure who presents with AMS.    Plan:  - Digoxin use is a rare cause of delirium in the doses used today and would be associated with digoxin toxicity. The patient's digoxin level was within normal limits. Other etiologies may more likely explain the patient's delirium, especially in a patient with multiple recent hospitalizations and mental decline prior to this.  - Continue metoprolol tartrate 100 mg bid  - Continue diltiazem  mg daily  - Continue digoxin 0.125 mg daily  - Continue furosemide 40 mg PO daily  - Neurology follow-up  - Remains confused/agitated at times  - Home hospice - can consider discontinuing cardiac meds

## 2022-02-23 LAB — SARS-COV-2 RNA SPEC QL NAA+PROBE: SIGNIFICANT CHANGE UP

## 2022-02-23 PROCEDURE — 99232 SBSQ HOSP IP/OBS MODERATE 35: CPT

## 2022-02-23 RX ADMIN — Medication 100 MILLIGRAM(S): at 05:28

## 2022-02-23 RX ADMIN — APIXABAN 2.5 MILLIGRAM(S): 2.5 TABLET, FILM COATED ORAL at 08:40

## 2022-02-23 RX ADMIN — Medication 100 MILLIGRAM(S): at 18:09

## 2022-02-23 RX ADMIN — Medication 40 MILLIGRAM(S): at 05:29

## 2022-02-23 RX ADMIN — Medication 240 MILLIGRAM(S): at 05:29

## 2022-02-23 RX ADMIN — APIXABAN 2.5 MILLIGRAM(S): 2.5 TABLET, FILM COATED ORAL at 20:25

## 2022-02-23 RX ADMIN — TAMSULOSIN HYDROCHLORIDE 0.4 MILLIGRAM(S): 0.4 CAPSULE ORAL at 20:24

## 2022-02-23 RX ADMIN — PANTOPRAZOLE SODIUM 40 MILLIGRAM(S): 20 TABLET, DELAYED RELEASE ORAL at 05:29

## 2022-02-23 RX ADMIN — Medication 125 MICROGRAM(S): at 05:29

## 2022-02-23 RX ADMIN — MIRTAZAPINE 15 MILLIGRAM(S): 45 TABLET, ORALLY DISINTEGRATING ORAL at 20:25

## 2022-02-23 RX ADMIN — HALOPERIDOL DECANOATE 2.5 MILLIGRAM(S): 100 INJECTION INTRAMUSCULAR at 20:24

## 2022-02-23 NOTE — PROGRESS NOTE ADULT - ASSESSMENT
The patient is a 89 year old male with a history of HTN, HL, DM, CKD, carotid stenosis, DVT, atrial fibrillation, chronic systolic heart failure who presents with AMS.    Plan:  - Digoxin use is a rare cause of delirium in the doses used today and would be associated with digoxin toxicity. The patient's digoxin level was within normal limits. Other etiologies may more likely explain the patient's delirium, especially in a patient with multiple recent hospitalizations and mental decline prior to this.  - Continue metoprolol tartrate 100 mg bid  - Continue diltiazem  mg daily  - Continue digoxin 0.125 mg daily  - Continue furosemide 40 mg PO daily  - Would not add additional heart failure meds (other beta blockers, ACEI/ARB) as patient going on hospice  - Neurology follow-up  - Remains confused/agitated at times  - Home hospice - can consider discontinuing cardiac meds

## 2022-02-23 NOTE — PROGRESS NOTE ADULT - SUBJECTIVE AND OBJECTIVE BOX
Subjective: Patient seen and examined.  NO overnight events.  Slept well.      MEDICATIONS  (STANDING):  apixaban 2.5 milliGRAM(s) Oral two times a day  digoxin     Tablet 125 MICROGram(s) Oral daily  diltiazem    milliGRAM(s) Oral daily  furosemide    Tablet 40 milliGRAM(s) Oral daily  gabapentin 100 milliGRAM(s) Oral daily  glucagon  Injectable 1 milliGRAM(s) IntraMuscular once  haloperidol     Tablet 2.5 milliGRAM(s) Oral every 12 hours  metoprolol tartrate 100 milliGRAM(s) Oral two times a day  mirtazapine 15 milliGRAM(s) Oral at bedtime  pantoprazole    Tablet 40 milliGRAM(s) Oral before breakfast  tamsulosin 0.4 milliGRAM(s) Oral at bedtime    MEDICATIONS  (PRN):  acetaminophen  Suppository .. 650 milliGRAM(s) Rectal every 6 hours PRN Temp greater or equal to 38C (100.4F)  OLANZapine Injectable 5 milliGRAM(s) IntraMuscular every 6 hours PRN Acute agitation      Allergies    aspirin (Anaphylaxis)  penicillin (Rash)  strawberry (Rash)    Intolerances        Vital Signs Last 24 Hrs  T(C): 36.4 (23 Feb 2022 05:26), Max: 36.4 (23 Feb 2022 05:26)  T(F): 97.6 (23 Feb 2022 05:26), Max: 97.6 (23 Feb 2022 05:26)  HR: 98 (23 Feb 2022 05:26) (98 - 98)  BP: 129/71 (23 Feb 2022 05:26) (129/71 - 129/71)  BP(mean): --  RR: 18 (23 Feb 2022 05:26) (18 - 18)  SpO2: 96% (23 Feb 2022 05:26) (96% - 96%)    PHYSICAL EXAM:  GENERAL: NAD, well-groomed, well-developed  HEAD:  Atraumatic, Normocephalic  ENMT: Moist mucous membranes,   NECK: Supple, No JVD, Normal thyroid  CHEST/LUNG: Clear to auscultation bilaterally; No rales, rhonchi, wheezing, or rubs  HEART: Regular rate and rhythm; No murmurs, rubs, or gallops  ABDOMEN: Soft, Nontender, Nondistended; Bowel sounds present  EXTREMITIES:  2+ Peripheral Pulses, No clubbing, cyanosis, or edema      LABS:              CAPILLARY BLOOD GLUCOSE          RADIOLOGY & ADDITIONAL TESTS:    Imaging Personally Reviewed:  [ ] YES     Consultant(s) Notes Reviewed:      Care Discussed with Consultants/Other Providers:    Advanced Directives: [ ] DNR  [ ] No feeding tube  [ ] MOLST in chart  [ ] MOLST completed today  [ ] Unknown

## 2022-02-23 NOTE — PROGRESS NOTE ADULT - SUBJECTIVE AND OBJECTIVE BOX
Chief Complaint: AMS    Interval Events: No events overnight.    Review of Systems:  General: No fevers, chills, weight gain  Skin: No rashes, color changes  Cardiovascular: No chest pain, orthopnea  Respiratory: No shortness of breath, cough  Gastrointestinal: No nausea, abdominal pain  Genitourinary: No incontinence, pain with urination  Musculoskeletal: No pain, swelling, decreased range of motion  Neurological: No headache, weakness  Psychiatric: No depression, anxiety  Endocrine: No weight gain, increased thirst  All other systems are comprehensively negative.    Physical Exam:  Vital Signs Last 24 Hrs  T(C): 36.4 (23 Feb 2022 05:26), Max: 36.8 (22 Feb 2022 10:07)  T(F): 97.6 (23 Feb 2022 05:26), Max: 98.2 (22 Feb 2022 10:07)  HR: 98 (23 Feb 2022 05:26) (76 - 98)  BP: 129/71 (23 Feb 2022 05:26) (129/71 - 132/69)  BP(mean): --  RR: 18 (23 Feb 2022 05:26) (18 - 18)  SpO2: 96% (23 Feb 2022 05:26) (95% - 96%)  General: NAD  HEENT: MMM  Neck: No JVD, no carotid bruit  Lungs: CTAB  CV: RRR, nl S1/S2, no M/R/G  Abdomen: S/NT/ND, +BS  Extremities: No LE edema, no cyanosis  Neuro: AAOx1  Skin: No rash    Labs:

## 2022-02-23 NOTE — PROGRESS NOTE ADULT - SUBJECTIVE AND OBJECTIVE BOX
Neurology follow up note    SAJI LEHMAN89yMale      Interval History:    Patient feels ok     Allergies    aspirin (Anaphylaxis)  penicillin (Rash)  strawberry (Rash)    Intolerances        MEDICATIONS    acetaminophen  Suppository .. 650 milliGRAM(s) Rectal every 6 hours PRN  apixaban 2.5 milliGRAM(s) Oral two times a day  digoxin     Tablet 125 MICROGram(s) Oral daily  diltiazem    milliGRAM(s) Oral daily  furosemide    Tablet 40 milliGRAM(s) Oral daily  gabapentin 100 milliGRAM(s) Oral daily  glucagon  Injectable 1 milliGRAM(s) IntraMuscular once  haloperidol     Tablet 2.5 milliGRAM(s) Oral every 12 hours  metoprolol tartrate 100 milliGRAM(s) Oral two times a day  mirtazapine 15 milliGRAM(s) Oral at bedtime  OLANZapine Injectable 5 milliGRAM(s) IntraMuscular every 6 hours PRN  pantoprazole    Tablet 40 milliGRAM(s) Oral before breakfast  tamsulosin 0.4 milliGRAM(s) Oral at bedtime              Vital Signs Last 24 Hrs  T(C): 36.4 (23 Feb 2022 05:26), Max: 36.4 (23 Feb 2022 05:26)  T(F): 97.6 (23 Feb 2022 05:26), Max: 97.6 (23 Feb 2022 05:26)  HR: 98 (23 Feb 2022 05:26) (98 - 98)  BP: 129/71 (23 Feb 2022 05:26) (129/71 - 129/71)  BP(mean): --  RR: 18 (23 Feb 2022 05:26) (18 - 18)  SpO2: 96% (23 Feb 2022 05:26) (96% - 96%)      REVIEW OF SYSTEMS:  Could not be obtained from the patient secondary to the patient being altered.    PHYSICAL EXAMINATION:   HEENT:  Head:  Normocephalic and atraumatic.  Eyes:  No scleral icterus.  Ears:  Hard to evaluate secondary to the patient being altered.  NECK:  Had increased tone, but is resisting.  CARDIOVASCULAR:  S1 and S2 heard.  RESPIRATORY:  Decreased breath sounds bilaterally, gives poor effort.  ABDOMEN:  Soft and nontender.  EXTREMITIES:  No clubbing or cyanosis were noted.      NEUROLOGIC:  The patient was awake in bed  To verbal stimuli, positive eye opening, on and off blink to visual threat. speech able to speak more     Motor:  I attempted to elevate bilateral upper extremities, the patient would actively resist, pull both arms down, would say 4/5.  Bilateral lower extremities, slight flexion at the hip and knee, but does have significantly increased tone.  The patient is mostly sedentary, nonambulatory.  appears [place]                  LABS:            Hemoglobin A1C:       Vitamin B12         RADIOLOGY      ANALYSIS AND PLAN:  A 89-year-old with episode of altered mental status.  For episode of altered mental status, questionable this could be metabolic encephalopathy from recent addition of digoxin cardiology noted since not toxic digoxin not playing a role in behavior  .  Examination is severely limited, but I do not see any new clear sign to suggest a new cerebrovascular accident has ensued.  For history of dementia, secondary to the patient's age, supportive therapy.  Atrial fibrillation, anticoagulation as needed.  Hypertension, monitor systolic blood pressure.  Hyperlipidemia, continue the patient on statin.  For diabetes, strict control of blood sugars.  monitor out put as needed   Spoke with son, Sabino, who says that he would be the primary contact, his telephone number is [phone] spoke to son today 2/21 today went to [facility] 2/22  Psychiatry followup as needed medication adjusted     Greater than 31 minutes of time was spent with the patient, plan of care, reviewing data, and speaking to multidisciplinary healthcare team with greater than 50% of that time in counseling and care coordination.    Thank you for the courtesy of this consultation.

## 2022-02-23 NOTE — PROGRESS NOTE ADULT - ASSESSMENT
89M HTN, DM2, HLD, Chronic Congestive Systolic Heart Failure, Atrial Fibrillation, CKD 3 admitted for Metabolic Enceophalopathy.     Metabolic Encephalopathy / Dementia - Worsening Dementia vs Delerium from acute urinary retention. Alvarez inserted and medications for agitation has been adjusted. Haldol PO + Mirtazapine. Off IV medications for 48 hours now. Currently on comfort care.     Chronic Compensated Systolic Congestive Heart Failure / Atrial Fibrillation - Metoprolol + Cardizem + Digoxin +Furosemide + Eliquis.  ACE/ARB/Entresto contraindicated due to Acute Renal Failure.     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 Suspected to be due to contrast nephropathy. Avoid nephrotoxic agents and monitor BMP and electrolytes. Nephrology consult noted.     DM 2 - Hold oral agents and continue ISS.      Diet - Regular    DVT Prophylaxis - Eliquis    Disposition - Patients acute delerium thought to be related to worsening dementia vs. Acute Urinary Retention and is now s/p alvarez catheter and has been off IV medications for agitation now 48 hours. Patient has been made comfort care and referred for home hospice.  However due to no Home Hospice nurse availble to do home evaluation until next week, patients family is very apprehensive taking him home due to his aggressive behavior.

## 2022-02-24 PROCEDURE — 99231 SBSQ HOSP IP/OBS SF/LOW 25: CPT

## 2022-02-24 RX ADMIN — TAMSULOSIN HYDROCHLORIDE 0.4 MILLIGRAM(S): 0.4 CAPSULE ORAL at 21:19

## 2022-02-24 RX ADMIN — Medication 100 MILLIGRAM(S): at 17:42

## 2022-02-24 RX ADMIN — MIRTAZAPINE 15 MILLIGRAM(S): 45 TABLET, ORALLY DISINTEGRATING ORAL at 21:19

## 2022-02-24 RX ADMIN — HALOPERIDOL DECANOATE 2.5 MILLIGRAM(S): 100 INJECTION INTRAMUSCULAR at 12:07

## 2022-02-24 RX ADMIN — Medication 125 MICROGRAM(S): at 05:21

## 2022-02-24 RX ADMIN — OLANZAPINE 5 MILLIGRAM(S): 15 TABLET, FILM COATED ORAL at 03:24

## 2022-02-24 RX ADMIN — APIXABAN 2.5 MILLIGRAM(S): 2.5 TABLET, FILM COATED ORAL at 21:19

## 2022-02-24 RX ADMIN — APIXABAN 2.5 MILLIGRAM(S): 2.5 TABLET, FILM COATED ORAL at 08:42

## 2022-02-24 RX ADMIN — PANTOPRAZOLE SODIUM 40 MILLIGRAM(S): 20 TABLET, DELAYED RELEASE ORAL at 05:22

## 2022-02-24 RX ADMIN — Medication 240 MILLIGRAM(S): at 05:21

## 2022-02-24 RX ADMIN — Medication 40 MILLIGRAM(S): at 05:21

## 2022-02-24 RX ADMIN — HALOPERIDOL DECANOATE 2.5 MILLIGRAM(S): 100 INJECTION INTRAMUSCULAR at 21:20

## 2022-02-24 RX ADMIN — Medication 100 MILLIGRAM(S): at 05:21

## 2022-02-24 RX ADMIN — GABAPENTIN 100 MILLIGRAM(S): 400 CAPSULE ORAL at 12:10

## 2022-02-24 NOTE — PROGRESS NOTE ADULT - SUBJECTIVE AND OBJECTIVE BOX
Chief Complaint: AMS    Interval Events: No events overnight.    Review of Systems:  General: No fevers, chills, weight gain  Skin: No rashes, color changes  Cardiovascular: No chest pain, orthopnea  Respiratory: No shortness of breath, cough  Gastrointestinal: No nausea, abdominal pain  Genitourinary: No incontinence, pain with urination  Musculoskeletal: No pain, swelling, decreased range of motion  Neurological: No headache, weakness  Psychiatric: No depression, anxiety  Endocrine: No weight gain, increased thirst  All other systems are comprehensively negative.    Physical Exam:  Vital Signs Last 24 Hrs  T(C): 36.6 (24 Feb 2022 04:43), Max: 36.9 (23 Feb 2022 22:00)  T(F): 97.8 (24 Feb 2022 04:43), Max: 98.4 (23 Feb 2022 22:00)  HR: 60 (24 Feb 2022 04:43) (60 - 72)  BP: 139/78 (24 Feb 2022 04:43) (120/70 - 139/78)  BP(mean): --  RR: 20 (24 Feb 2022 04:43) (20 - 20)  SpO2: 94% (24 Feb 2022 04:43) (94% - 95%)  General: NAD  HEENT: MMM  Neck: No JVD, no carotid bruit  Lungs: CTAB  CV: RRR, nl S1/S2, no M/R/G  Abdomen: S/NT/ND, +BS  Extremities: No LE edema, no cyanosis  Neuro: AAOx1  Skin: No rash    Labs:

## 2022-02-24 NOTE — CHART NOTE - NSCHARTNOTEFT_GEN_A_CORE
Assessment:     Pt seen for nutrition follow-up. Pt is a "89 y/o M with PMH of HTN, DM type 2 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 Fib, DVT on Eliquis, CKD stage 3, BPH, Malignant Melanoma, Basal Cell Carcinoma, Diverticulitis, Obesity, and Dementia who presented with AMS & agitation over the past 2 days, son reports longstanding dementia but rapid decline over the past month, was agitated & more confused over the past 2 days, son unable to take care of him & request placement. Patient is non communication, was agitated & received Ativan 0.5 mg IM X1, currently asleep but easily arousable, confused, then returns back to sleep."    Visited pt this afternoon, pt resting in bed during time of visit. Pt previously seen for nutrition assessment secondary to chronic CHF and > A1c (8.7%). Diet previously initiated to regular consistency (con CHO, DASH/TLC diet), however, was downgraded to pureed consistency given presentation (admitted with worsening dementia and general decline). Pt was seen for swallow evaluation on 2/19- recommended puree consistency diet with moderately thick liquids. Diet order currently active for puree (new diet order pending- diet office aware of need for mod thick liquids and con cho, low sodium diet restriction). Observed meal tray at bedside this am, pt consumed pudding and some thickened fluids. PO intakes variable throughout admission, ranging from 0-100% per RN documentation, overall fair po intake. Pt requires total assistance at mealtimes. +BM 2/23. [place] allergy noted. Providing reduced sugar magic cup fortified ice cream BID for additional kcal/protein and to supplement variable po intake (290kcal, 9g protein per 4 oz cup). Skin: stage I pressure ulcer to sacrum. Diet education (heart failure, DM, modified consistency) not appropriate secondary to hx dementia/prognosis- plan for home hospice. Palliative care following. Pt DNR/DNI, has been made comfort care and referred for home hospice. RD will continue to follow-up and monitor pt's nutrition status.    Factors impacting intake: [ ] none [ ] nausea  [ ] vomiting [ ] diarrhea [ ] constipation  [X] chewing problems [X] swallowing issues  [ ] other:     Diet Prescription: Diet, Pureed (02-17-22 @ 14:46)    Intake: variable po intake throughout adm, overall fair po intake    Current Weight: Weight (kg): 84.912 (17 Feb 2022 04:46)  % Weight Change  2/17 187.2#    Pertinent Medications: MEDICATIONS  (STANDING):  apixaban 2.5 milliGRAM(s) Oral two times a day  digoxin     Tablet 125 MICROGram(s) Oral daily  diltiazem    milliGRAM(s) Oral daily  furosemide    Tablet 40 milliGRAM(s) Oral daily  gabapentin 100 milliGRAM(s) Oral daily  glucagon  Injectable 1 milliGRAM(s) IntraMuscular once  haloperidol     Tablet 2.5 milliGRAM(s) Oral every 12 hours  metoprolol tartrate 100 milliGRAM(s) Oral two times a day  mirtazapine 15 milliGRAM(s) Oral at bedtime  pantoprazole    Tablet 40 milliGRAM(s) Oral before breakfast  tamsulosin 0.4 milliGRAM(s) Oral at bedtime    MEDICATIONS  (PRN):  acetaminophen  Suppository .. 650 milliGRAM(s) Rectal every 6 hours PRN Temp greater or equal to 38C (100.4F)  OLANZapine Injectable 5 milliGRAM(s) IntraMuscular every 6 hours PRN Acute agitation    Pertinent Labs:  02-21 Alb 2.6 g/dL<L>      Skin: pressure ulcer; stage I to sacrum, skin tear; BL elbow    Estimated Needs:   [X] no change since previous assessment  [ ] recalculated:     Previous Nutrition Diagnosis:   [ ] Inadequate Energy Intake [ ]Inadequate Oral Intake [ ] Excessive Energy Intake   [ ] Underweight [ ] Increased Nutrient Needs [ ] Overweight/Obesity [X] Swallowing Difficulty   [ ] Altered GI Function [ ] Unintended Weight Loss [ ] Food & Nutrition Related Knowledge Deficit [ ] Malnutrition     Nutrition Diagnosis is [X] ongoing  [ ] resolved [ ] not applicable     New Nutrition Diagnosis: [ ] not applicable       Interventions: Continue nutrition care plan, con cho puree diet, moderately thick liquids, reduced sugar magic cup fortified ice cream BID, total assistance and encouragement at mealtimes  Recommend  [ ] Change Diet To:  [ ] Nutrition Supplement  [ ] Nutrition Support  [ ] Other:     Monitoring and Evaluation:   [ ] PO intake [ x ] Tolerance to diet prescription [ x ] weights [ x ] labs[ x ] follow up per protocol  [ ] other:

## 2022-02-24 NOTE — PROGRESS NOTE ADULT - SUBJECTIVE AND OBJECTIVE BOX
Subjective: Patient seen and examined.  Calm currently.     MEDICATIONS  (STANDING):  apixaban 2.5 milliGRAM(s) Oral two times a day  digoxin     Tablet 125 MICROGram(s) Oral daily  diltiazem    milliGRAM(s) Oral daily  furosemide    Tablet 40 milliGRAM(s) Oral daily  gabapentin 100 milliGRAM(s) Oral daily  glucagon  Injectable 1 milliGRAM(s) IntraMuscular once  haloperidol     Tablet 2.5 milliGRAM(s) Oral every 12 hours  metoprolol tartrate 100 milliGRAM(s) Oral two times a day  mirtazapine 15 milliGRAM(s) Oral at bedtime  pantoprazole    Tablet 40 milliGRAM(s) Oral before breakfast  tamsulosin 0.4 milliGRAM(s) Oral at bedtime    MEDICATIONS  (PRN):  acetaminophen  Suppository .. 650 milliGRAM(s) Rectal every 6 hours PRN Temp greater or equal to 38C (100.4F)  OLANZapine Injectable 5 milliGRAM(s) IntraMuscular every 6 hours PRN Acute agitation      Allergies    aspirin (Anaphylaxis)  penicillin (Rash)  strawberry (Rash)    Intolerances        Vital Signs Last 24 Hrs  T(C): 36.6 (24 Feb 2022 04:43), Max: 36.9 (23 Feb 2022 22:00)  T(F): 97.8 (24 Feb 2022 04:43), Max: 98.4 (23 Feb 2022 22:00)  HR: 60 (24 Feb 2022 04:43) (60 - 72)  BP: 139/78 (24 Feb 2022 04:43) (120/70 - 139/78)  BP(mean): --  RR: 20 (24 Feb 2022 04:43) (20 - 20)  SpO2: 94% (24 Feb 2022 04:43) (94% - 95%)    PHYSICAL EXAM:  GENERAL: NAD, well-groomed, well-developed  HEAD:  Atraumatic, Normocephalic  ENMT: Moist mucous membranes,   NECK: Supple, No JVD, Normal thyroid  NERVOUS SYSTEM:  All 4 extremities mobile, no gross sensory deficits.   CHEST/LUNG: Clear to auscultation bilaterally; No rales, rhonchi, wheezing, or rubs  HEART: Regular rate and rhythm; No murmurs, rubs, or gallops  ABDOMEN: Soft, Nontender, Nondistended; Bowel sounds present  EXTREMITIES:  2+ Peripheral Pulses, No clubbing, cyanosis, or edema      LABS:              CAPILLARY BLOOD GLUCOSE          RADIOLOGY & ADDITIONAL TESTS:    Imaging Personally Reviewed:  [ ] YES     Consultant(s) Notes Reviewed:      Care Discussed with Consultants/Other Providers:    Advanced Directives: [ ] DNR  [ ] No feeding tube  [ ] MOLST in chart  [ ] MOLST completed today  [ ] Unknown

## 2022-02-24 NOTE — PROGRESS NOTE ADULT - SUBJECTIVE AND OBJECTIVE BOX
Neurology follow up note    SAJI LEHMAN89yMale      Interval History:    Patient resting in bed     Allergies    aspirin (Anaphylaxis)  penicillin (Rash)  strawberry (Rash)    Intolerances        MEDICATIONS    acetaminophen  Suppository .. 650 milliGRAM(s) Rectal every 6 hours PRN  apixaban 2.5 milliGRAM(s) Oral two times a day  digoxin     Tablet 125 MICROGram(s) Oral daily  diltiazem    milliGRAM(s) Oral daily  furosemide    Tablet 40 milliGRAM(s) Oral daily  gabapentin 100 milliGRAM(s) Oral daily  glucagon  Injectable 1 milliGRAM(s) IntraMuscular once  haloperidol     Tablet 2.5 milliGRAM(s) Oral every 12 hours  metoprolol tartrate 100 milliGRAM(s) Oral two times a day  mirtazapine 15 milliGRAM(s) Oral at bedtime  OLANZapine Injectable 5 milliGRAM(s) IntraMuscular every 6 hours PRN  pantoprazole    Tablet 40 milliGRAM(s) Oral before breakfast  tamsulosin 0.4 milliGRAM(s) Oral at bedtime              Vital Signs Last 24 Hrs  T(C): 36.6 (24 Feb 2022 04:43), Max: 36.9 (23 Feb 2022 22:00)  T(F): 97.8 (24 Feb 2022 04:43), Max: 98.4 (23 Feb 2022 22:00)  HR: 60 (24 Feb 2022 04:43) (60 - 72)  BP: 139/78 (24 Feb 2022 04:43) (120/70 - 139/78)  BP(mean): --  RR: 20 (24 Feb 2022 04:43) (20 - 20)  SpO2: 94% (24 Feb 2022 04:43) (94% - 95%)      REVIEW OF SYSTEMS:  Could not be obtained from the patient secondary to the patient being altered.    PHYSICAL EXAMINATION:   HEENT:  Head:  Normocephalic and atraumatic.  Eyes:  No scleral icterus.  Ears:  Hard to evaluate secondary to the patient being altered.  NECK:  Had increased tone, but is resisting.  CARDIOVASCULAR:  S1 and S2 heard.  RESPIRATORY:  Decreased breath sounds bilaterally, gives poor effort.  ABDOMEN:  Soft and nontender.  EXTREMITIES:  No clubbing or cyanosis were noted.      NEUROLOGIC:  The patient was awake in bed  To verbal stimuli, positive eye opening, on and off blink to visual threat. speech able to speak more     Motor:  I attempted to elevate bilateral upper extremities, the patient would actively resist, pull both arms down, would say 4/5.  Bilateral lower extremities, slight flexion at the hip and knee, but does have significantly increased tone.  The patient is mostly sedentary, nonambulatory.  appears [place]              LABS:            Hemoglobin A1C:       Vitamin B12         RADIOLOGY    ANALYSIS AND PLAN:  A 89-year-old with episode of altered mental status.  For episode of altered mental status, questionable this could be metabolic encephalopathy from recent addition of digoxin cardiology noted since not toxic digoxin not playing a role in behavior  .  Examination is severely limited, but I do not see any new clear sign to suggest a new cerebrovascular accident has ensued.  For history of dementia, secondary to the patient's age, supportive therapy.  Atrial fibrillation, anticoagulation as needed.  Hypertension, monitor systolic blood pressure.  Hyperlipidemia, continue the patient on statin.  For diabetes, strict control of blood sugars.  monitor out put as needed   Spoke with son, Sabino, who says that he would be the primary contact, his telephone number is [phone] spoke to son today 2/21 today went to [facility] 2/22  Psychiatry followup as needed medication adjusted   chart reviewed on home hospice will sign off     Greater than 18 minutes of time was spent with the patient, plan of care, reviewing data, and speaking to multidisciplinary healthcare team with greater than 50% of that time in counseling and care coordination.    Thank you for the courtesy of this consultation.

## 2022-02-24 NOTE — PROGRESS NOTE ADULT - ASSESSMENT
89M HTN, DM2, HLD, Chronic Congestive Systolic Heart Failure, Atrial Fibrillation, CKD 3 admitted for Metabolic Enceophalopathy.     Metabolic Encephalopathy / Dementia - Worsening Dementia vs Delerium from acute urinary retention. Cuba inserted and medications for agitation has been adjusted. Haldol PO + Mirtazapine. Was agitated overnight and got PRN Zyprexa. Currently on comfort care but will get Psych eval for better management.     Chronic Compensated Systolic Congestive Heart Failure / Atrial Fibrillation - Metoprolol + Cardizem + Digoxin +Furosemide + Eliquis.  ACE/ARB/Entresto contraindicated due to Acute Renal Failure.     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 Suspected to be due to contrast nephropathy. Avoid nephrotoxic agents and monitor BMP and electrolytes. Nephrology consult noted.     DM 2 - Hold oral agents and continue ISS.      Diet - Regular    DVT Prophylaxis - Eliquis    Disposition - Patients acute delirium thought to be related to worsening dementia vs. Acute Urinary Retention and is now s/p Cuba catheter and is agitation has improved significantly overall. Patient has been made comfort care and referred for home hospice.  However due to no Home Hospice nurse available to do home evaluation until next week, patients family is very apprehensive taking him home due to his aggressive behavior.

## 2022-02-25 ENCOUNTER — TRANSCRIPTION ENCOUNTER (OUTPATIENT)
Age: 87
End: 2022-02-25

## 2022-02-25 PROCEDURE — 99231 SBSQ HOSP IP/OBS SF/LOW 25: CPT

## 2022-02-25 RX ORDER — HALOPERIDOL DECANOATE 100 MG/ML
5 INJECTION INTRAMUSCULAR
Qty: 300 | Refills: 0
Start: 2022-02-25 | End: 2022-03-26

## 2022-02-25 RX ORDER — LINAGLIPTIN 5 MG/1
1 TABLET, FILM COATED ORAL
Qty: 0 | Refills: 0 | DISCHARGE

## 2022-02-25 RX ORDER — HALOPERIDOL DECANOATE 100 MG/ML
5 INJECTION INTRAMUSCULAR
Qty: 0 | Refills: 0 | DISCHARGE
Start: 2022-02-25

## 2022-02-25 RX ADMIN — TAMSULOSIN HYDROCHLORIDE 0.4 MILLIGRAM(S): 0.4 CAPSULE ORAL at 23:39

## 2022-02-25 RX ADMIN — PANTOPRAZOLE SODIUM 40 MILLIGRAM(S): 20 TABLET, DELAYED RELEASE ORAL at 05:44

## 2022-02-25 RX ADMIN — MIRTAZAPINE 15 MILLIGRAM(S): 45 TABLET, ORALLY DISINTEGRATING ORAL at 23:40

## 2022-02-25 RX ADMIN — APIXABAN 2.5 MILLIGRAM(S): 2.5 TABLET, FILM COATED ORAL at 08:14

## 2022-02-25 RX ADMIN — GABAPENTIN 100 MILLIGRAM(S): 400 CAPSULE ORAL at 11:26

## 2022-02-25 RX ADMIN — Medication 240 MILLIGRAM(S): at 05:43

## 2022-02-25 RX ADMIN — Medication 125 MICROGRAM(S): at 05:43

## 2022-02-25 RX ADMIN — Medication 40 MILLIGRAM(S): at 05:43

## 2022-02-25 RX ADMIN — APIXABAN 2.5 MILLIGRAM(S): 2.5 TABLET, FILM COATED ORAL at 23:40

## 2022-02-25 RX ADMIN — Medication 100 MILLIGRAM(S): at 05:44

## 2022-02-25 NOTE — DISCHARGE NOTE PROVIDER - ATTENDING DISCHARGE PHYSICAL EXAMINATION:
PHYSICAL EXAM:  Vital Signs Last 24 Hrs  T(C): 37.1 (25 Feb 2022 10:38), Max: 37.1 (25 Feb 2022 10:38)  T(F): 98.7 (25 Feb 2022 10:38), Max: 98.7 (25 Feb 2022 10:38)  HR: 98 (25 Feb 2022 10:38) (89 - 98)  BP: 119/64 (25 Feb 2022 10:38) (119/64 - 159/98)  BP(mean): --  RR: 16 (25 Feb 2022 10:38) (16 - 18)  SpO2: 96% (25 Feb 2022 10:38) (95% - 96%)    GENERAL: NAD, well-groomed, well-developed  HEAD:  Atraumatic, Normocephalic  EYES: EOMI, PERRLA, conjunctiva and sclera clear  ENMT: No tonsillar erythema, exudates, or enlargement; Moist mucous membranes, Good dentition, No lesions  NECK: Supple, No JVD, Normal thyroid   CHEST/LUNG: Clear to auscultation bilaterally; No rales, rhonchi, wheezing, or rubs  HEART: Regular rate and rhythm; No murmurs, rubs, or gallops  ABDOMEN: Soft, Nontender, Nondistended; Bowel sounds present  EXTREMITIES:  2+ Peripheral Pulses, No clubbing, cyanosis, or edema  LYMPH: No lymphadenopathy noted  SKIN: No rashes or lesions

## 2022-02-25 NOTE — PROGRESS NOTE ADULT - ASSESSMENT
The patient is a 89 year old male with a history of HTN, HL, DM, CKD, carotid stenosis, DVT, atrial fibrillation, chronic systolic heart failure who presents with AMS.    2/25/22  Seen at [facility]  Patient lying flat, sleeping comfortably    Plan:  - As mentioned, Digoxin use is a rare cause of delirium in the doses used today and would be associated with digoxin toxicity. The patient's digoxin level was within normal limits. Other etiologies may more likely explain the patient's delirium, especially in a patient with multiple recent hospitalizations and mental decline prior to this.  - Continue metoprolol tartrate 100 mg bid  - Continue diltiazem  mg daily  - Continue digoxin 0.125 mg daily  - Continue furosemide 40 mg PO daily  - Would not add additional heart failure meds (other beta blockers, ACEI/ARB) as patient going on hospice  - Neurology follow-up  - Remains confused/agitated at times  - Home hospice - can consider discontinuing cardiac meds

## 2022-02-25 NOTE — DISCHARGE NOTE PROVIDER - NSDCMRMEDTOKEN_GEN_ALL_CORE_FT
dilTIAZem 240 mg/24 hours oral capsule, extended release: 1 cap(s) orally once a day  Flomax 0.4 mg oral capsule: 1 cap(s) orally once a day  furosemide 40 mg oral tablet: 1 tab(s) orally once a day  haloperidol 0.5 mg oral tablet: 5 tab(s) orally every 12 hours  Myrbetriq 50 mg oral tablet, extended release: 1 tab(s) orally once a day  Remeron: 15 milligram(s) orally once a day (at bedtime)

## 2022-02-25 NOTE — DISCHARGE NOTE NURSING/CASE MANAGEMENT/SOCIAL WORK - PATIENT PORTAL LINK FT
You can access the FollowMyHealth Patient Portal offered by [facility] by registering at the following website: [email or web address]. By joining [employer]’s FollowMyHealth portal, you will also be able to view your health information using other applications (apps) compatible with our system.

## 2022-02-25 NOTE — DISCHARGE NOTE NURSING/CASE MANAGEMENT/SOCIAL WORK - NSDCVIVACCINE_GEN_ALL_CORE_FT
Tdap; 27-Nov-2020 07:55; Bette Pizano (ALLIE); [employer]; [id number] (Exp. Date: 31-Jul-2022); IntraMuscular; Deltoid Left.; 0.5 milliLiter(s); VIS (VIS Published: 09-May-2013, VIS Presented: 27-Nov-2020);

## 2022-02-25 NOTE — DISCHARGE NOTE PROVIDER - NSDCCPCAREPLAN_GEN_ALL_CORE_FT
PRINCIPAL DISCHARGE DIAGNOSIS  Diagnosis: Altered mental status  Assessment and Plan of Treatment: Due to worsening dementia and urinary retention. Cuba inserted. Has improved.  Now comfort care.

## 2022-02-25 NOTE — PROGRESS NOTE ADULT - SUBJECTIVE AND OBJECTIVE BOX
Subjective: Patient seen and examined. No overnight events.     MEDICATIONS  (STANDING):  apixaban 2.5 milliGRAM(s) Oral two times a day  digoxin     Tablet 125 MICROGram(s) Oral daily  diltiazem    milliGRAM(s) Oral daily  furosemide    Tablet 40 milliGRAM(s) Oral daily  gabapentin 100 milliGRAM(s) Oral daily  glucagon  Injectable 1 milliGRAM(s) IntraMuscular once  haloperidol     Tablet 2.5 milliGRAM(s) Oral every 12 hours  metoprolol tartrate 100 milliGRAM(s) Oral two times a day  mirtazapine 15 milliGRAM(s) Oral at bedtime  pantoprazole    Tablet 40 milliGRAM(s) Oral before breakfast  tamsulosin 0.4 milliGRAM(s) Oral at bedtime    MEDICATIONS  (PRN):  acetaminophen  Suppository .. 650 milliGRAM(s) Rectal every 6 hours PRN Temp greater or equal to 38C (100.4F)  OLANZapine Injectable 5 milliGRAM(s) IntraMuscular every 6 hours PRN Acute agitation      Allergies    aspirin (Anaphylaxis)  penicillin (Rash)  strawberry (Rash)    Intolerances        Vital Signs Last 24 Hrs  T(C): 36.5 (25 Feb 2022 06:21), Max: 36.8 (24 Feb 2022 18:00)  T(F): 97.7 (25 Feb 2022 06:21), Max: 98.3 (24 Feb 2022 18:00)  HR: 98 (25 Feb 2022 06:21) (89 - 98)  BP: 159/98 (25 Feb 2022 06:21) (130/86 - 159/98)  BP(mean): --  RR: 18 (25 Feb 2022 06:21) (18 - 18)  SpO2: 95% (25 Feb 2022 06:21) (95% - 95%)    PHYSICAL EXAM:  GENERAL: NAD, well-groomed, well-developed  HEAD:  Atraumatic, Normocephalic  ENMT: Moist mucous membranes,   NECK: Supple, No JVD, Normal thyroid  NERVOUS SYSTEM:  All 4 extremities mobile, no gross sensory deficits.   CHEST/LUNG: Clear to auscultation bilaterally; No rales, rhonchi, wheezing, or rubs  HEART: Regular rate and rhythm; No murmurs, rubs, or gallops  ABDOMEN: Soft, Nontender, Nondistended; Bowel sounds present  EXTREMITIES:  2+ Peripheral Pulses, No clubbing, cyanosis, or edema      LABS:              CAPILLARY BLOOD GLUCOSE          RADIOLOGY & ADDITIONAL TESTS:    Imaging Personally Reviewed:  [ ] YES     Consultant(s) Notes Reviewed:      Care Discussed with Consultants/Other Providers:    Advanced Directives: [ ] DNR  [ ] No feeding tube  [ ] MOLST in chart  [ ] MOLST completed today  [ ] Unknown

## 2022-02-25 NOTE — PROGRESS NOTE ADULT - SUBJECTIVE AND OBJECTIVE BOX
Patient is a 89y Male with a known history of :  AMS (altered mental status) [R41.82]    Lactate blood increased [R79.89]    DM (diabetes mellitus), type 2, uncontrolled [E11.65]    Chronic systolic congestive heart failure [I50.22]    History of DVT in adulthood [Z86.718]    Chronic atrial fibrillation [I48.20]    Stage 3 chronic kidney disease [N18.30]    Need for prophylactic measure [Z29.9]    BPH without urinary obstruction [N40.0]      HPI:  This is a 89 y/o M with PMH of HTN, DM type 2 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 Fib, DVT on Eliquis, CKD stage 3, BPH, Malignant Melanoma, Basal Cell Carcinoma, Diverticulitis, Obesity, and Dementia who presented with AMS & agitation over the past 2 days, son reports longstanding dementia but rapid decline over the past month, was agitated & more confused over the past 2 days, son unable to take care of him & request placement. Patient is non communication, was agitated & received Ativan 0.5 mg IM X1, currently asleep but easily arousable, confused, then returns back to sleep. No further history could be obtained at this time.    (17 Feb 2022 05:59)      REVIEW OF SYSTEMS:    CONSTITUTIONAL: No fever, weight loss, or fatigue  EYES: No eye pain, visual disturbances, or discharge  ENMT:  No difficulty hearing, tinnitus, vertigo; No sinus or throat pain  NECK: No pain or stiffness  BREASTS: No pain, masses, or nipple discharge  RESPIRATORY: No cough, wheezing, chills or hemoptysis; No shortness of breath  CARDIOVASCULAR: No chest pain, palpitations, dizziness, or leg swelling  GASTROINTESTINAL: No abdominal or epigastric pain. No nausea, vomiting, or hematemesis; No diarrhea or constipation. No melena or hematochezia.  GENITOURINARY: No dysuria, frequency, hematuria, or incontinence  NEUROLOGICAL: No headaches, memory loss, loss of strength, numbness, or tremors  SKIN: No itching, burning, rashes, or lesions   LYMPH NODES: No enlarged glands  ENDOCRINE: No heat or cold intolerance; No hair loss  MUSCULOSKELETAL: No joint pain or swelling; No muscle, back, or extremity pain  PSYCHIATRIC: No depression, anxiety, mood swings, or difficulty sleeping  HEME/LYMPH: No easy bruising, or bleeding gums  ALLERGY AND IMMUNOLOGIC: No hives or eczema    MEDICATIONS  (STANDING):  apixaban 2.5 milliGRAM(s) Oral two times a day  digoxin     Tablet 125 MICROGram(s) Oral daily  diltiazem    milliGRAM(s) Oral daily  furosemide    Tablet 40 milliGRAM(s) Oral daily  gabapentin 100 milliGRAM(s) Oral daily  glucagon  Injectable 1 milliGRAM(s) IntraMuscular once  haloperidol     Tablet 2.5 milliGRAM(s) Oral every 12 hours  metoprolol tartrate 100 milliGRAM(s) Oral two times a day  mirtazapine 15 milliGRAM(s) Oral at bedtime  pantoprazole    Tablet 40 milliGRAM(s) Oral before breakfast  tamsulosin 0.4 milliGRAM(s) Oral at bedtime    MEDICATIONS  (PRN):  acetaminophen  Suppository .. 650 milliGRAM(s) Rectal every 6 hours PRN Temp greater or equal to 38C (100.4F)  OLANZapine Injectable 5 milliGRAM(s) IntraMuscular every 6 hours PRN Acute agitation      ALLERGIES: aspirin (Anaphylaxis)  penicillin (Rash)  strawberry (Rash)      FAMILY HISTORY:  Family history of diabetes mellitus (Mother)        Social history:  Alochol:   Smoking:   Drug Use:   Marital Status:     PHYSICAL EXAMINATION:  -----------------------------  T(C): 37.1 (02-25-22 @ 10:38), Max: 37.1 (02-25-22 @ 10:38)  HR: 98 (02-25-22 @ 10:38) (89 - 98)  BP: 119/64 (02-25-22 @ 10:38) (119/64 - 159/98)  RR: 16 (02-25-22 @ 10:38) (16 - 18)  SpO2: 96% (02-25-22 @ 10:38) (95% - 96%)  Wt(kg): --    02-24 @ 07:01  -  02-25 @ 07:00  --------------------------------------------------------  IN:    Oral Fluid: 720 mL  Total IN: 720 mL    OUT:    Indwelling Catheter - Urethral (mL): 2350 mL  Total OUT: 2350 mL    Total NET: -1630 mL            Constitutional: well developed, normal appearance, well groomed, well nourished, no deformities and no acute distress.   Eyes: the conjunctiva exhibited no abnormalities and the eyelids demonstrated no xanthelasmas.   HEENT: normal oral mucosa, no oral pallor and no oral cyanosis.   Neck: normal jugular venous A waves present, normal jugular venous V waves present and no jugular venous hinds A waves.   Pulmonary: no respiratory distress, normal respiratory rhythm and effort, no accessory muscle use and lungs were clear to auscultation bilaterally. Anteriorly  Cardiovascular: heart rate and rhythm were irreg/irreg, normal S1 and S2 and no murmur, gallop, rub, heave or thrill are present.   Musculoskeletal: the gait could not be assessed.   Extremities: no clubbing of the fingernails, no localized cyanosis, no petechial hemorrhages and no ischemic changes.   Skin: normal skin color and pigmentation, no rash, no venous stasis, no skin lesions, no skin ulcer and no xanthoma was observed.       LABS:   --------                       Radiology:    < from: US Transthoracic Echocardiogram w/Doppler Complete (12.20.21 @ 07:36) >    ACC: [id number] EXAM:  US TTE W DOPPLER COMPLETE                          PROCEDURE DATE:  12/20/2021          INTERPRETATION:  Ordering Physician: CLARISA BUNCH [id number]    Indication: Atrial fibrillation    Technician: [email or web address]    Study Quality: Limited    Height: 5 feet 8 inches  Weight: 220 pounds  Blood Pressure: 107/81    MEASUREMENTS  IVS: 1.3  PWT: 1.1  LA: 3.9  Aortic Root: 3.3  LVIDd: 4.4  LVIDs: 3.6    LVEF: 35%    FINDINGS  Left Ventricle: Left ventricle is of normal size there appears to be   asymmetrical septal hypertrophy noted. There is evidence of diastolic   dysfunction and marked reduction in systolic function  Right Ventricle: Right ventricle is of normal size and function  Left Atrium: Left atrium is of normal size  Right Atrium: Right atrium is of normal size  Mitral Valve: Mitral valve has normal leaflet excursion. The leaflets are   thickened. There is evidence of mitral annular calcification. And there   is mild to moderate mitral regurgitation.  Aortic Valve: Aorticvalve has normal leaflet excursion. The aortic   leaflets are thickened.  Tricuspid Valve: Tricuspid valve is not well visualized but has normal   thickness and leaflet excursion. Overall right-sided pressures appears to   be borderline  Pulmonic Valve: Trace pulmonic insufficiency  Pericardium/Pleura: No evidence of pericardial effusion      IMPRESSION:  1. Moderate to severe reduction in left ventricular systolic function  2. mitral annular calcification. Mild to moderate mitral regurgitation.  3. borderline pulmonary pressures  4. limited study    --- End of Report ---            HARVEY BARBOZA MD; Attending Cardiologist  This document has been electronically signed. Dec 20 2021  9:30AM    < end of copied text >

## 2022-02-25 NOTE — DISCHARGE NOTE PROVIDER - HOSPITAL COURSE
89M HTN, DM2, HLD, Chronic Congestive Systolic Heart Failure, Atrial Fibrillation, CKD 3 admitted for Metabolic Enceophalopathy.     Metabolic Encephalopathy / Dementia - Worsening Dementia vs Delirium from acute urinary retention. Cuba inserted and medications for agitation has been adjusted. Haldol PO + Mirtazapine. Patient is now on comfort care.     Chronic Compensated Systolic Congestive Heart Failure / Atrial Fibrillation - Metoprolol + Cardizem + Digoxin +Furosemide + Eliquis.  ACE/ARB/Entresto contraindicated due to Acute Renal Failure. We will continue these medications until patient is evaluated by Hospice at home next week and has the support services in place before discontinuing.     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 Suspected to be due to contrast nephropathy. Avoid nephrotoxic agents and monitor BMP and electrolytes. Nephrology was consulted.     DM 2 - No need to check BS due to patient being on comfort care     Disposition - Patients acute delirium thought to be related to worsening dementia vs. Acute Urinary Retention and is now s/p Cuba catheter and is agitation has improved significantly overall. Patient has been made comfort care and referred for home hospice.  However due to no Home Hospice nurse available to do home evaluation until next week, we will continue some of his medications until patient can have the proper support at home.

## 2022-02-25 NOTE — PROGRESS NOTE ADULT - PROVIDER SPECIALTY LIST ADULT
Cardiology
Hospitalist
Hospitalist
Neurology
Cardiology
Hospitalist
Hospitalist
Neurology
Cardiology
Hospitalist
Neurology
Neurology
Hospitalist
Internal Medicine

## 2022-02-25 NOTE — DISCHARGE NOTE NURSING/CASE MANAGEMENT/SOCIAL WORK - NSDCPEFALRISK_GEN_ALL_CORE
For information on Fall & Injury Prevention, visit: [email or web address] OR  [email or web address] OR  https://www.cdc.gov/steadi/patient.html

## 2022-02-25 NOTE — DISCHARGE NOTE PROVIDER - NSDCCAREPROVSEEN_GEN_ALL_CORE_FT
Yoselin, Francie Cooney, [facility]  Harris, Leti Iglesias, Beny Jiang, Martín Blair, Randi Nguyen, Shamar AU

## 2022-02-26 VITALS
RESPIRATION RATE: 16 BRPM | HEART RATE: 81 BPM | DIASTOLIC BLOOD PRESSURE: 62 MMHG | SYSTOLIC BLOOD PRESSURE: 127 MMHG | OXYGEN SATURATION: 96 % | TEMPERATURE: 101 F

## 2022-02-26 PROCEDURE — 84443 ASSAY THYROID STIM HORMONE: CPT

## 2022-02-26 PROCEDURE — 96360 HYDRATION IV INFUSION INIT: CPT

## 2022-02-26 PROCEDURE — 83036 HEMOGLOBIN GLYCOSYLATED A1C: CPT

## 2022-02-26 PROCEDURE — 84145 PROCALCITONIN (PCT): CPT

## 2022-02-26 PROCEDURE — 82140 ASSAY OF AMMONIA: CPT

## 2022-02-26 PROCEDURE — 85610 PROTHROMBIN TIME: CPT

## 2022-02-26 PROCEDURE — 71045 X-RAY EXAM CHEST 1 VIEW: CPT

## 2022-02-26 PROCEDURE — 85027 COMPLETE CBC AUTOMATED: CPT

## 2022-02-26 PROCEDURE — 82962 GLUCOSE BLOOD TEST: CPT

## 2022-02-26 PROCEDURE — 81001 URINALYSIS AUTO W/SCOPE: CPT

## 2022-02-26 PROCEDURE — 93005 ELECTROCARDIOGRAM TRACING: CPT

## 2022-02-26 PROCEDURE — 99285 EMERGENCY DEPT VISIT HI MDM: CPT

## 2022-02-26 PROCEDURE — 83880 ASSAY OF NATRIURETIC PEPTIDE: CPT

## 2022-02-26 PROCEDURE — 99239 HOSP IP/OBS DSCHRG MGMT >30: CPT

## 2022-02-26 PROCEDURE — 82607 VITAMIN B-12: CPT

## 2022-02-26 PROCEDURE — 82746 ASSAY OF FOLIC ACID SERUM: CPT

## 2022-02-26 PROCEDURE — 80162 ASSAY OF DIGOXIN TOTAL: CPT

## 2022-02-26 PROCEDURE — 80053 COMPREHEN METABOLIC PANEL: CPT

## 2022-02-26 PROCEDURE — 87086 URINE CULTURE/COLONY COUNT: CPT

## 2022-02-26 PROCEDURE — 87040 BLOOD CULTURE FOR BACTERIA: CPT

## 2022-02-26 PROCEDURE — 85025 COMPLETE CBC W/AUTO DIFF WBC: CPT

## 2022-02-26 PROCEDURE — 85730 THROMBOPLASTIN TIME PARTIAL: CPT

## 2022-02-26 PROCEDURE — 36415 COLL VENOUS BLD VENIPUNCTURE: CPT

## 2022-02-26 PROCEDURE — U0005: CPT

## 2022-02-26 PROCEDURE — U0003: CPT

## 2022-02-26 PROCEDURE — 92610 EVALUATE SWALLOWING FUNCTION: CPT

## 2022-02-26 PROCEDURE — 87635 SARS-COV-2 COVID-19 AMP PRB: CPT

## 2022-02-26 PROCEDURE — 83605 ASSAY OF LACTIC ACID: CPT

## 2022-02-26 PROCEDURE — 70450 CT HEAD/BRAIN W/O DYE: CPT | Mod: MA

## 2022-02-26 RX ADMIN — Medication 650 MILLIGRAM(S): at 10:06

## 2022-02-26 RX ADMIN — Medication 125 MICROGRAM(S): at 05:16

## 2022-02-26 RX ADMIN — Medication 100 MILLIGRAM(S): at 05:16

## 2022-02-26 RX ADMIN — Medication 240 MILLIGRAM(S): at 05:16

## 2022-02-26 RX ADMIN — APIXABAN 2.5 MILLIGRAM(S): 2.5 TABLET, FILM COATED ORAL at 09:19

## 2022-02-26 RX ADMIN — HALOPERIDOL DECANOATE 2.5 MILLIGRAM(S): 100 INJECTION INTRAMUSCULAR at 09:19

## 2022-02-26 RX ADMIN — Medication 40 MILLIGRAM(S): at 05:16

## 2022-02-26 RX ADMIN — PANTOPRAZOLE SODIUM 40 MILLIGRAM(S): 20 TABLET, DELAYED RELEASE ORAL at 05:16

## 2022-03-17 NOTE — ED ADULT NURSE NOTE - PERIPHERAL PULSES
Population Health 's Attempted Call    Social Work note:     reviewed chart and attempted to reach patient.  SWs left a message on patient's phone with reason for call and provided contact information.    's next contact:  Week of 3/21 or upon returned call from patient    
equal bilaterally

## 2022-03-20 PROCEDURE — 93306 TTE W/DOPPLER COMPLETE: CPT | Mod: 26

## 2022-03-22 NOTE — PROVIDER CONTACT NOTE (CRITICAL VALUE NOTIFICATION) - NS PROVIDER READ BACK TO LAB
yes
Propranolol Counseling:  I discussed with the patient the risks of propranolol including but not limited to low heart rate, low blood pressure, low blood sugar, restlessness and increased cold sensitivity. They should call the office if they experience any of these side effects.

## 2022-12-12 NOTE — ED ADULT NURSE NOTE - CHPI ED NUR SYMPTOMS NEG
levothyroxine 112 MCG tablet         Thyroid Hormones Refill Protocol - 12 Month Protocol Passed 12/12/2022 11:24 AM   Protocol Details  Seen by prescribing provider or same department within the last 12 months or has a future appt in 3 months - IF FAILED PLEASE LOOK AT CHART REVIEW FOR LAST VISIT AND PROCEED ACCORDINGLY    Patient is not pregnant    Normal TSH within last 12 months looking at last value - IF FAILED REFER TO PROTOCOL DETAILS    Medication (including dose and sig) on current meds list          
no abdominal distension/no blood in stool/no burning urination/no chills/no diarrhea/no dysuria/no fever/no hematuria

## 2023-02-08 NOTE — DISCHARGE NOTE PROVIDER - NSDCHHCONTACT_GEN_ALL_CORE_FT
Dr. Cristobal Larson MD    Your primary care doctor: Dr. Shamar Joyner MD    Please follow with Dermatology  
As certified below, I, or a nurse practitioner or physician assistant working with me, had a face-to-face encounter that meets the physician face-to-face encounter requirements.

## 2023-04-11 NOTE — PATIENT PROFILE ADULT - FALL HARM RISK - FALL HARM RISK
Chief complaint:   Chief Complaint   Patient presents with   • Pre-Op Exam     4/28/2023, Dr. Rice       Vitals:  Visit Vitals  /84 (BP Location: LUE - Left upper extremity, Patient Position: Sitting, Cuff Size: Large Adult)   Pulse 76   Temp 98 °F (36.7 °C) (Oral)   Resp 16   Ht 5' 5\" (1.651 m)   Wt 100.2 kg (221 lb)   SpO2 97%   BMI 36.78 kg/m²       HISTORY OF PRESENT ILLNESS     HPI  Patient presents for preoperative cardiovascular examination at the request of Dr. Rice.  Patient is scheduled for right wrist decompression on April 28, 2023 due to chronic wrist pain.    Chest pain/shortness of breath or palpitations worse with exertion/better with rest:  no     History of CKD, hypertension or diabetes:  no    History peripheral vascular disease:  no    History of myocardial infarction, valvular heart disease, congestive heart failaure or CVA:  no    Adverse reaction to anesthesia in the past:  no    Last echo/stress test:  2020    Functional status:  Patient is able to      Can do heavy work around the house such as scrubbing floors or lifting or  moving heavy furniture or climb two flights of stairs (between 4 and 10  METs).        Revised Cardiac Risk Index Calculation:               Total Points:  0          Percent risk of major cardiac event:  3.9      Other significant problems:  Patient Active Problem List    Diagnosis Date Noted   • Hypersomnia with sleep apnea 09/21/2020     Priority: Medium   • Obesity with body mass index (BMI) of 30.0 to 39.9 09/21/2020     Priority: Medium   • Other insomnia 12/23/2020     Priority: Low   • Severe obstructive sleep apnea 09/21/2020     Priority: Low   • Nocturnal hypoxia 09/21/2020     Priority: Low       PAST MEDICAL, FAMILY AND SOCIAL HISTORY     Medications:  Current Outpatient Medications   Medication Sig Dispense Refill   • busPIRone (BUSPAR) 30 MG tablet Take 1 tablet by mouth in the morning and 1 tablet in the evening. 180 tablet 3   • diazePAM  (Valium) 5 MG tablet 1 tablet 30 minutes prior to procedure.  May repeat as needed at the time of the procedure. 2 tablet 0   • lisinopril-hydroCHLOROthiazide (ZESTORETIC) 20-25 MG per tablet Take 1 tablet by mouth daily. 90 tablet 0   • calcipotriene (DOVONEX) 0.005 % cream APPLY TOPICALLY TO THE AFFECTED AREA EVERY MORNING 180 g 1   • betamethasone dipropionate augmented (DIPROLENE) 0.05 % ointment Apply to affected areas daily 45 g 3   • naproxen (NAPROSYN) 500 MG tablet TAKE 1 TABLET BY MOUTH IN THE MORNING AND IN THE EVENING WITH MEALS 60 tablet 0   • colchicine (COLCRYS) 0.6 MG tablet Take 1 tablet by mouth in the morning and 1 tablet in the evening. 14 tablet 0   • buPROPion XL (WELLBUTRIN XL) 150 MG 24 hr tablet TAKE 1 TABLET BY MOUTH DAILY 90 tablet 3   • amLODIPine (NORVASC) 5 MG tablet TAKE 1 TABLET BY MOUTH DAILY 90 tablet 3   • naproxen (NAPROSYN) 500 MG tablet Take 1 tablet by mouth 2 times daily (with meals). Take for 1 week on and 1 week off. 30 tablet 0   • oxyCODONE-acetaminophen (PERCOCET) 5-325 MG per tablet Take 1 tablet by mouth every 8 hours as needed for Pain. 20 tablet 0   • clotrimazole-betamethasone (Lotrisone) 1-0.05 % cream Apply 1 application topically 2 times daily. 30 g 0   • colchicine (COLCRYS) 0.6 MG tablet Take 1 tablet by mouth daily. 14 tablet 0   • HYDROcodone-acetaminophen (NORCO) 5-325 MG per tablet Take 1 tablet by mouth every 6 hours as needed for Pain. 12 tablet 0   • tazarotene (TAZORAC) 0.1 % cream Apply topically nightly. Apply to affected areas qhs. 30 g 0   • ibuprofen 200 MG capsule Take 200 mg by mouth every 6 hours as needed for Pain.       No current facility-administered medications for this visit.       Allergies:  ALLERGIES:  No Known Allergies    Past Medical  History/Surgeries:  Past Medical History:   Diagnosis Date   • Essential (primary) hypertension    • Rash        History reviewed. No pertinent surgical history.    Family History:  Family History    Problem Relation Age of Onset   • Hypertension Mother    • Hypertension Father    • Myocardial Infarction Father        Social History:  Social History     Tobacco Use   • Smoking status: Never   • Smokeless tobacco: Never   • Tobacco comments:     He occasionally smokes a cigarette socially   Substance Use Topics   • Alcohol use: Yes       REVIEW OF SYSTEMS     Review of Systems   Constitutional: Negative for activity change, fatigue and unexpected weight change.   Respiratory: Negative for cough, chest tightness, shortness of breath and wheezing.    Cardiovascular: Negative for chest pain, palpitations and leg swelling.   Gastrointestinal: Negative for nausea and vomiting.   Skin: Negative for color change and pallor.   Neurological: Negative for dizziness, syncope, weakness and light-headedness.       PHYSICAL EXAM     Physical Exam  Constitutional:       General: He is not in acute distress.     Appearance: He is well-developed. He is not diaphoretic.   HENT:      Head: Normocephalic and atraumatic.   Eyes:      Conjunctiva/sclera: Conjunctivae normal.      Pupils: Pupils are equal, round, and reactive to light.   Cardiovascular:      Rate and Rhythm: Normal rate and regular rhythm.      Heart sounds: Normal heart sounds. No murmur heard.    No friction rub. No gallop.   Pulmonary:      Effort: Pulmonary effort is normal. No respiratory distress.      Breath sounds: Normal breath sounds. No wheezing.   Skin:     General: Skin is warm and dry.         ASSESSMENT/PLAN     1. Preoperative cardiovascular examination    2. Colon cancer screening    3. Annual physical exam        Orders Placed This Encounter   • Cologuard   • CBC with Automated Differential   • Comprehensive Metabolic Panel   • Glycohemoglobin   • Lipid Panel With Reflex   • PSA   • Thyroid Stimulating Hormone Reflex   • Electrocardiogram 12-Lead   • busPIRone (BUSPAR) 30 MG tablet     Patient has acceptable cardiac risk for surgery.    His BuSpar  was increased to 30 mg as he has been taking 2 tablets b.i.d..    EKG reviewed.  Normal Sinus rhythm no signs of ischemia.  · Medication side effects, risk and benefits discussed.  Patient is aware of them and is willing to take the medication(s).  Appropriate over the counter medication use or avoidance discussed.  Maximum recommended doses discussed.    The patient indicated understanding of the diagnosis and agreed with the plan of care. Will return to clinic for any new or worsening symptoms.           Age/Other

## 2023-04-26 NOTE — BH CONSULTATION LIAISON ASSESSMENT NOTE - NSBHPSYCHHX_PSY_A_CORE
Goal Outcome Evaluation:      VSS, alert and orientated. Pt went down for a R total hip at 1200 and returned to the floor at 1700. Pt has ambulated in room since surgery and has no complaints of pain. Pt complained of post-op N/V so scopolamine patch was placed behind L ear. R hip nerve block was removed and a wound vac was placed.            no

## 2023-09-05 NOTE — ED PROVIDER NOTE - BIRTH SEX
----- Message from Iveth Stafford MD sent at 9/4/2023  3:37 PM CDT -----  BP high in clinic. Please call and get updated home reading   Male

## 2023-09-13 NOTE — DIETITIAN INITIAL EVALUATION ADULT. - PROBLEM SELECTOR PLAN 3
Medication:   Requested Prescriptions     Pending Prescriptions Disp Refills    traMADol (ULTRAM) 50 MG tablet [Pharmacy Med Name: traMADol HCl Oral Tablet 50 MG] 90 tablet 0     Sig: Take 1 tablet by mouth once every 8 hours as needed for Pain.  Take lowest dose possible to manage pain, reducing doses taken as pain becomes manageable Max daily amount 150mg        Last Filled:  08/17/2023     Patient Phone Number: [phone] (home) [phone] (work)    Last appt: 8/17/2023   Next appt: 9/14/2023    Last OARRS:        No data to display
random plasma glucose was 345 mg/dl on presentation, hold Januvia & Tradjenta, started on Lantus 7 units subcutaneously daily in addition to insulin Lispro on a sliding scale before meals & at bedtime. adjust the Lantus dose based on total daily insulin requirements. get Glycohemoglobin level in am.

## 2023-11-06 NOTE — DIETITIAN INITIAL EVALUATION ADULT. - ENERGY INTAKE
Good (>75%) Rituxan Pregnancy And Lactation Text: This medication is Pregnancy Category C and it isn't know if it is safe during pregnancy. It is unknown if this medication is excreted in breast milk but similar antibodies are known to be excreted.

## 2024-05-29 NOTE — DISCHARGE NOTE PROVIDER - NSCORESITESY/N_GEN_A_CORE_RD
No

## 2025-05-20 NOTE — DISCHARGE NOTE NURSING/CASE MANAGEMENT/SOCIAL WORK - NSDCPEPTCAREGIVEDUMATLIST _GEN_ALL_CORE
Problem: PAIN - ADULT  Goal: Verbalizes/displays adequate comfort level or baseline comfort level  Description: Interventions:  - Encourage patient to monitor pain and request assistance  - Assess pain using appropriate pain scale  - Administer analgesics as ordered based on type and severity of pain and evaluate response  - Implement non-pharmacological measures as appropriate and evaluate response  - Consider cultural and social influences on pain and pain management  - Notify physician/advanced practitioner if interventions unsuccessful or patient reports new pain  - Educate patient/family on pain management process including their role and importance of  reporting pain   - Provide non-pharmacologic/complimentary pain relief interventions  Outcome: Progressing     Problem: SAFETY ADULT  Goal: Patient will remain free of falls  Description: INTERVENTIONS:  - Educate patient/family on patient safety including physical limitations  - Instruct patient to call for assistance with activity   - Consider consulting OT/PT to assist with strengthening/mobility based on AM PAC & JH-HLM score  - Consult OT/PT to assist with strengthening/mobility   - Keep Call bell within reach  - Keep bed low and locked with side rails adjusted as appropriate  - Keep care items and personal belongings within reach  - Initiate and maintain comfort rounds  - Make Fall Risk Sign visible to staff  - Offer Toileting every 2 Hours, in advance of need  - Initiate/Maintain alarm  - Obtain necessary fall risk management equipment:   - Apply yellow socks and bracelet for high fall risk patients  - Consider moving patient to room near nurses station  Outcome: Progressing     Problem: Knowledge Deficit  Goal: Patient/family/caregiver demonstrates understanding of disease process, treatment plan, medications, and discharge instructions  Description: Complete learning assessment and assess knowledge base.  Interventions:  - Provide teaching at level of  understanding  - Provide teaching via preferred learning methods  Outcome: Progressing     Problem: CARDIOVASCULAR - ADULT  Goal: Maintains optimal cardiac output and hemodynamic stability  Description: INTERVENTIONS:  - Monitor I/O, vital signs and rhythm  - Monitor for S/S and trends of decreased cardiac output  - Administer and titrate ordered vasoactive medications to optimize hemodynamic stability  - Assess quality of pulses, skin color and temperature  - Assess for signs of decreased coronary artery perfusion  - Instruct patient to report change in severity of symptoms  Outcome: Progressing  Goal: Absence of cardiac dysrhythmias or at baseline rhythm  Description: INTERVENTIONS:  - Continuous cardiac monitoring, vital signs, obtain 12 lead EKG if ordered  - Administer antiarrhythmic and heart rate control medications as ordered  - Monitor electrolytes and administer replacement therapy as ordered  Outcome: Progressing      Heart Failure/Diabetes/Influenza Vaccination/Apixaban/Eliquis